# Patient Record
Sex: MALE | Race: WHITE | NOT HISPANIC OR LATINO | Employment: OTHER | URBAN - METROPOLITAN AREA
[De-identification: names, ages, dates, MRNs, and addresses within clinical notes are randomized per-mention and may not be internally consistent; named-entity substitution may affect disease eponyms.]

---

## 2017-01-31 ENCOUNTER — OFFICE VISIT - HEALTHEAST (OUTPATIENT)
Dept: FAMILY MEDICINE | Facility: CLINIC | Age: 62
End: 2017-01-31

## 2017-01-31 ENCOUNTER — RECORDS - HEALTHEAST (OUTPATIENT)
Dept: GENERAL RADIOLOGY | Facility: CLINIC | Age: 62
End: 2017-01-31

## 2017-01-31 DIAGNOSIS — M25.512 PAIN IN LEFT SHOULDER: ICD-10-CM

## 2017-01-31 DIAGNOSIS — M25.512 LEFT SHOULDER PAIN: ICD-10-CM

## 2017-02-01 ENCOUNTER — COMMUNICATION - HEALTHEAST (OUTPATIENT)
Dept: FAMILY MEDICINE | Facility: CLINIC | Age: 62
End: 2017-02-01

## 2017-10-27 ENCOUNTER — AMBULATORY - HEALTHEAST (OUTPATIENT)
Dept: NURSING | Facility: CLINIC | Age: 62
End: 2017-10-27

## 2017-10-27 DIAGNOSIS — Z23 NEED FOR INFLUENZA VACCINATION: ICD-10-CM

## 2018-12-11 ENCOUNTER — AMBULATORY - HEALTHEAST (OUTPATIENT)
Dept: FAMILY MEDICINE | Facility: CLINIC | Age: 63
End: 2018-12-11

## 2018-12-11 ENCOUNTER — AMBULATORY - HEALTHEAST (OUTPATIENT)
Dept: NURSING | Facility: CLINIC | Age: 63
End: 2018-12-11

## 2020-11-24 ENCOUNTER — AMBULATORY - HEALTHEAST (OUTPATIENT)
Dept: NURSING | Facility: CLINIC | Age: 65
End: 2020-11-24

## 2021-05-30 VITALS — WEIGHT: 164 LBS | BODY MASS INDEX: 25.31 KG/M2

## 2021-06-08 NOTE — PROGRESS NOTES
"  Subjective:      Rodrigo Lanier is a 61 y.o. male who presents for evaluation of left shoulder pain.  He fell on 12/30/16.  He was walking outside and slipped on some ice on the sidewalk.  He caught himself with his left hand, then his elbow hit the ground, then he thinks he landed on his shoulder.  Pain was pretty bad immediately after the fall.  Pain in the shoulder has improved over time.  However, it still bothering him somewhat.  Specifically, he notices that if he raises his arm above shoulder level or above his head, he has significant pain and range of motion is decreased.  He is left-hand dominant.  Normal  strength in the left hand.  He says sometimes he feels like his shoulder muscles \"feel weak.\"   If he raises his left arm above shoulder level using his right arm, it is less painful.    He has iced it and used ibuprofen.  They have helped a little bit.  He has a past history of a left elbow fracture 10 years ago.    Patient Active Problem List   Diagnosis     Snoring     Hyperlipidemia     Nicotine dependence     Skin lesion     No current outpatient prescriptions on file.     Objective:     No Known Allergies  Vitals:    01/31/17 1127   BP: 148/80   Pulse:    Resp:    SpO2:      Body mass index is 25.31 kg/(m^2).    Vitals reviewed as above.  General: Patient is alert and oriented x 3, in no apparent distress  Cardiac: Regular rate and rhythm, no murmurs  Pulmonary: lungs clear to ausculation, no crackles, rales, rhonchi, or wheezing  Musculoskeletal: Normal 4/5 strength in major muscle groups in upper extremities bilaterally, normal left hand  strength, normal left radial pulse, normal pain with flexion or extension at the left elbow, joint appears to be moving in normal alignment in the left elbow, left shoulder appears healthy and normal, no edema, no pain with palpation along the left lateral clavicle, no pain with palpation of the left shoulder joint, no ligament laxity noted in the " shoulder, he has difficulty with external rotation of left shoulder, can complete internal rotation of the left shoulder, he can raise his arm to 90  without significant pain, cannot raise it above that, if I raise his arm above that I can increase his range of motion slightly, but it is painful    I personally reviewed grossly normal shoulder x-ray today.  XR SHOULDER LEFT 2 OR MORE VWS1/31/2017 11:43 AM  INDICATION: left shoulder pain. COMPARISON: None.  FINDINGS: Negative shoulder. No fracture or dislocation.  This report was electronically interpreted by: Dr. Gia Aquino MD ON 01/31/2017 at 12:59    Assessment and Plan:     Left shoulder pain, status post fall.  Pain has improved a fair amount since injury.  X-ray completed today and appears grossly normal.  We'll contact patient with normal results.  Consider the following treatment options: Physical therapy, monitoring, MRI, Ortho referral.  Patient will let us know which she prefers.  Until then conservative management and ibuprofen as needed.    This dictation uses voice recognition software, which may contain typographical errors.

## 2022-11-15 PROCEDURE — 99285 EMERGENCY DEPT VISIT HI MDM: CPT | Mod: 25

## 2022-11-16 ENCOUNTER — HOSPITAL ENCOUNTER (INPATIENT)
Facility: CLINIC | Age: 67
LOS: 4 days | Discharge: HOME | DRG: 234 | End: 2022-11-20
Attending: EMERGENCY MEDICINE | Admitting: INTERNAL MEDICINE
Payer: MEDICARE

## 2022-11-16 ENCOUNTER — APPOINTMENT (OUTPATIENT)
Dept: GENERAL RADIOLOGY | Facility: CLINIC | Age: 67
DRG: 234 | End: 2022-11-16
Attending: PHYSICIAN ASSISTANT
Payer: MEDICARE

## 2022-11-16 ENCOUNTER — ANESTHESIA EVENT (OUTPATIENT)
Dept: SURGERY | Facility: CLINIC | Age: 67
DRG: 234 | End: 2022-11-16
Payer: MEDICARE

## 2022-11-16 ENCOUNTER — APPOINTMENT (OUTPATIENT)
Dept: CARDIOLOGY | Facility: CLINIC | Age: 67
DRG: 234 | End: 2022-11-16
Attending: INTERNAL MEDICINE
Payer: MEDICARE

## 2022-11-16 ENCOUNTER — APPOINTMENT (OUTPATIENT)
Dept: GENERAL RADIOLOGY | Facility: CLINIC | Age: 67
DRG: 234 | End: 2022-11-16
Attending: INTERNAL MEDICINE
Payer: MEDICARE

## 2022-11-16 ENCOUNTER — ANESTHESIA (OUTPATIENT)
Dept: SURGERY | Facility: CLINIC | Age: 67
DRG: 234 | End: 2022-11-16
Payer: MEDICARE

## 2022-11-16 ENCOUNTER — APPOINTMENT (OUTPATIENT)
Dept: CT IMAGING | Facility: CLINIC | Age: 67
DRG: 234 | End: 2022-11-16
Attending: EMERGENCY MEDICINE
Payer: MEDICARE

## 2022-11-16 DIAGNOSIS — E13.69 OTHER SPECIFIED DIABETES MELLITUS WITH OTHER SPECIFIED COMPLICATION, WITHOUT LONG-TERM CURRENT USE OF INSULIN (H): ICD-10-CM

## 2022-11-16 DIAGNOSIS — I21.4 NSTEMI (NON-ST ELEVATED MYOCARDIAL INFARCTION) (H): ICD-10-CM

## 2022-11-16 DIAGNOSIS — I25.110 CORONARY ARTERY DISEASE INVOLVING NATIVE CORONARY ARTERY OF NATIVE HEART WITH UNSTABLE ANGINA PECTORIS (H): ICD-10-CM

## 2022-11-16 DIAGNOSIS — F17.200 CONTINUOUS NICOTINE DEPENDENCE: ICD-10-CM

## 2022-11-16 DIAGNOSIS — I10 BENIGN ESSENTIAL HYPERTENSION: ICD-10-CM

## 2022-11-16 DIAGNOSIS — Z95.1 S/P CABG (CORONARY ARTERY BYPASS GRAFT): Primary | ICD-10-CM

## 2022-11-16 LAB
ABO/RH(D): NORMAL
ABO/RH(D): NORMAL
ACT BLD: 165 SECONDS (ref 74–150)
ALBUMIN SERPL-MCNC: 2.6 G/DL (ref 3.4–5)
ALBUMIN SERPL-MCNC: 3.3 G/DL (ref 3.4–5)
ALBUMIN UR-MCNC: 30 MG/DL
ALP SERPL-CCNC: 56 U/L (ref 40–150)
ALP SERPL-CCNC: 77 U/L (ref 40–150)
ALT SERPL W P-5'-P-CCNC: 30 U/L (ref 0–70)
ALT SERPL W P-5'-P-CCNC: 32 U/L (ref 0–70)
ANION GAP SERPL CALCULATED.3IONS-SCNC: 11 MMOL/L (ref 3–14)
ANION GAP SERPL CALCULATED.3IONS-SCNC: 4 MMOL/L (ref 3–14)
ANION GAP SERPL CALCULATED.3IONS-SCNC: 5 MMOL/L (ref 3–14)
ANTIBODY SCREEN: NEGATIVE
APPEARANCE UR: CLEAR
APTT PPP: 28 SECONDS (ref 22–38)
APTT PPP: 32 SECONDS (ref 22–38)
AST SERPL W P-5'-P-CCNC: 157 U/L (ref 0–45)
AST SERPL W P-5'-P-CCNC: 32 U/L (ref 0–45)
ATRIAL RATE - MUSE: 68 BPM
BASE EXCESS BLDA CALC-SCNC: -1.9 MMOL/L (ref -9–1.8)
BASE EXCESS BLDV CALC-SCNC: -1.4 MMOL/L (ref -7.7–1.9)
BASE EXCESS BLDV CALC-SCNC: -2.6 MMOL/L (ref -7.7–1.9)
BASOPHILS # BLD AUTO: 0.1 10E3/UL (ref 0–0.2)
BASOPHILS NFR BLD AUTO: 0 %
BILIRUB DIRECT SERPL-MCNC: <0.1 MG/DL (ref 0–0.2)
BILIRUB SERPL-MCNC: 0.4 MG/DL (ref 0.2–1.3)
BILIRUB SERPL-MCNC: 0.8 MG/DL (ref 0.2–1.3)
BILIRUB UR QL STRIP: NEGATIVE
BLD PROD TYP BPU: NORMAL
BLD PROD TYP BPU: NORMAL
BLOOD COMPONENT TYPE: NORMAL
BLOOD COMPONENT TYPE: NORMAL
BUN SERPL-MCNC: 15 MG/DL (ref 7–30)
BUN SERPL-MCNC: 15 MG/DL (ref 7–30)
BUN SERPL-MCNC: 16 MG/DL (ref 7–30)
CA-I BLD-MCNC: 4.6 MG/DL (ref 4.4–5.2)
CALCIUM SERPL-MCNC: 7.9 MG/DL (ref 8.5–10.1)
CALCIUM SERPL-MCNC: 8.5 MG/DL (ref 8.5–10.1)
CALCIUM SERPL-MCNC: 8.8 MG/DL (ref 8.5–10.1)
CHLORIDE BLD-SCNC: 105 MMOL/L (ref 94–109)
CHLORIDE BLD-SCNC: 113 MMOL/L (ref 94–109)
CHLORIDE BLD-SCNC: 116 MMOL/L (ref 94–109)
CHOLEST SERPL-MCNC: 258 MG/DL
CO2 SERPL-SCNC: 20 MMOL/L (ref 20–32)
CO2 SERPL-SCNC: 23 MMOL/L (ref 20–32)
CO2 SERPL-SCNC: 24 MMOL/L (ref 20–32)
CODING SYSTEM: NORMAL
CODING SYSTEM: NORMAL
COLOR UR AUTO: YELLOW
CREAT SERPL-MCNC: 0.68 MG/DL (ref 0.66–1.25)
CREAT SERPL-MCNC: 0.8 MG/DL (ref 0.66–1.25)
CREAT SERPL-MCNC: 0.82 MG/DL (ref 0.66–1.25)
CROSSMATCH: NORMAL
CROSSMATCH: NORMAL
DIASTOLIC BLOOD PRESSURE - MUSE: NORMAL MMHG
EOSINOPHIL # BLD AUTO: 0 10E3/UL (ref 0–0.7)
EOSINOPHIL NFR BLD AUTO: 0 %
ERYTHROCYTE [DISTWIDTH] IN BLOOD BY AUTOMATED COUNT: 11.9 % (ref 10–15)
ERYTHROCYTE [DISTWIDTH] IN BLOOD BY AUTOMATED COUNT: 12.2 % (ref 10–15)
ERYTHROCYTE [DISTWIDTH] IN BLOOD BY AUTOMATED COUNT: 12.3 % (ref 10–15)
FIBRINOGEN PPP-MCNC: 233 MG/DL (ref 170–490)
FIBRINOGEN PPP-MCNC: 234 MG/DL (ref 170–490)
GFR SERPL CREATININE-BSD FRML MDRD: >90 ML/MIN/1.73M2
GLUCOSE BLD-MCNC: 130 MG/DL (ref 70–99)
GLUCOSE BLD-MCNC: 160 MG/DL (ref 70–99)
GLUCOSE BLD-MCNC: 230 MG/DL (ref 70–99)
GLUCOSE BLDC GLUCOMTR-MCNC: 122 MG/DL (ref 70–99)
GLUCOSE BLDC GLUCOMTR-MCNC: 127 MG/DL (ref 70–99)
GLUCOSE BLDC GLUCOMTR-MCNC: 137 MG/DL (ref 70–99)
GLUCOSE BLDC GLUCOMTR-MCNC: 204 MG/DL (ref 70–99)
GLUCOSE UR STRIP-MCNC: 150 MG/DL
HBA1C MFR BLD: 6.6 % (ref 0–5.6)
HCO3 BLD-SCNC: 24 MMOL/L (ref 21–28)
HCO3 BLDV-SCNC: 23 MMOL/L (ref 21–28)
HCO3 BLDV-SCNC: 25 MMOL/L (ref 21–28)
HCT VFR BLD AUTO: 34.2 % (ref 40–53)
HCT VFR BLD AUTO: 36.9 % (ref 40–53)
HCT VFR BLD AUTO: 48 % (ref 40–53)
HDLC SERPL-MCNC: 40 MG/DL
HGB BLD-MCNC: 11.7 G/DL (ref 13.3–17.7)
HGB BLD-MCNC: 12.9 G/DL (ref 13.3–17.7)
HGB BLD-MCNC: 16.8 G/DL (ref 13.3–17.7)
HGB UR QL STRIP: ABNORMAL
IMM GRANULOCYTES # BLD: 0.1 10E3/UL
IMM GRANULOCYTES NFR BLD: 0 %
INR PPP: 1.28 (ref 0.85–1.15)
INR PPP: 1.52 (ref 0.85–1.15)
INTERPRETATION ECG - MUSE: NORMAL
ISSUE DATE AND TIME: NORMAL
ISSUE DATE AND TIME: NORMAL
KETONES UR STRIP-MCNC: ABNORMAL MG/DL
LACTATE SERPL-SCNC: 2.9 MMOL/L (ref 0.7–2)
LDLC SERPL CALC-MCNC: 173 MG/DL
LEUKOCYTE ESTERASE UR QL STRIP: ABNORMAL
LVEF ECHO: NORMAL
LYMPHOCYTES # BLD AUTO: 2.4 10E3/UL (ref 0.8–5.3)
LYMPHOCYTES NFR BLD AUTO: 18 %
MAGNESIUM SERPL-MCNC: 2.7 MG/DL (ref 1.6–2.3)
MCH RBC QN AUTO: 29.7 PG (ref 26.5–33)
MCH RBC QN AUTO: 30.3 PG (ref 26.5–33)
MCH RBC QN AUTO: 30.6 PG (ref 26.5–33)
MCHC RBC AUTO-ENTMCNC: 34.2 G/DL (ref 31.5–36.5)
MCHC RBC AUTO-ENTMCNC: 35 G/DL (ref 31.5–36.5)
MCHC RBC AUTO-ENTMCNC: 35 G/DL (ref 31.5–36.5)
MCV RBC AUTO: 85 FL (ref 78–100)
MCV RBC AUTO: 87 FL (ref 78–100)
MCV RBC AUTO: 89 FL (ref 78–100)
MONOCYTES # BLD AUTO: 0.7 10E3/UL (ref 0–1.3)
MONOCYTES NFR BLD AUTO: 5 %
MUCOUS THREADS #/AREA URNS LPF: PRESENT /LPF
NEUTROPHILS # BLD AUTO: 9.6 10E3/UL (ref 1.6–8.3)
NEUTROPHILS NFR BLD AUTO: 77 %
NITRATE UR QL: NEGATIVE
NONHDLC SERPL-MCNC: 218 MG/DL
NRBC # BLD AUTO: 0 10E3/UL
NRBC BLD AUTO-RTO: 0 /100
NT-PROBNP SERPL-MCNC: 605 PG/ML (ref 0–900)
O2/TOTAL GAS SETTING VFR VENT: 50 %
OXYHGB MFR BLDV: 72 % (ref 70–75)
OXYHGB MFR BLDV: 97 % (ref 70–75)
P AXIS - MUSE: 79 DEGREES
PCO2 BLD: 42 MM HG (ref 35–45)
PCO2 BLDV: 43 MM HG (ref 40–50)
PCO2 BLDV: 47 MM HG (ref 40–50)
PH BLD: 7.36 [PH] (ref 7.35–7.45)
PH BLDV: 7.33 [PH] (ref 7.32–7.43)
PH BLDV: 7.34 [PH] (ref 7.32–7.43)
PH UR STRIP: 5.5 [PH] (ref 5–7)
PHOSPHATE SERPL-MCNC: 2.5 MG/DL (ref 2.5–4.5)
PLATELET # BLD AUTO: 145 10E3/UL (ref 150–450)
PLATELET # BLD AUTO: 162 10E3/UL (ref 150–450)
PLATELET # BLD AUTO: 234 10E3/UL (ref 150–450)
PO2 BLD: 157 MM HG (ref 80–105)
PO2 BLDV: 101 MM HG (ref 25–47)
PO2 BLDV: 40 MM HG (ref 25–47)
POTASSIUM BLD-SCNC: 3.7 MMOL/L (ref 3.4–5.3)
POTASSIUM BLD-SCNC: 4 MMOL/L (ref 3.4–5.3)
POTASSIUM BLD-SCNC: 4.2 MMOL/L (ref 3.4–5.3)
PR INTERVAL - MUSE: 160 MS
PROT SERPL-MCNC: 5 G/DL (ref 6.8–8.8)
PROT SERPL-MCNC: 6.6 G/DL (ref 6.8–8.8)
QRS DURATION - MUSE: 88 MS
QT - MUSE: 394 MS
QTC - MUSE: 418 MS
R AXIS - MUSE: 35 DEGREES
RBC # BLD AUTO: 3.86 10E6/UL (ref 4.4–5.9)
RBC # BLD AUTO: 4.22 10E6/UL (ref 4.4–5.9)
RBC # BLD AUTO: 5.65 10E6/UL (ref 4.4–5.9)
RBC URINE: 72 /HPF
SARS-COV-2 RNA RESP QL NAA+PROBE: NEGATIVE
SODIUM SERPL-SCNC: 136 MMOL/L (ref 133–144)
SODIUM SERPL-SCNC: 141 MMOL/L (ref 133–144)
SODIUM SERPL-SCNC: 144 MMOL/L (ref 133–144)
SP GR UR STRIP: 1.02 (ref 1–1.03)
SPECIMEN EXPIRATION DATE: NORMAL
SPECIMEN EXPIRATION DATE: NORMAL
SQUAMOUS EPITHELIAL: <1 /HPF
SYSTOLIC BLOOD PRESSURE - MUSE: NORMAL MMHG
T AXIS - MUSE: 189 DEGREES
TRIGL SERPL-MCNC: 227 MG/DL
TROPONIN I SERPL HS-MCNC: 1675 NG/L
TROPONIN I SERPL HS-MCNC: 809 NG/L
UFH PPP CHRO-ACNC: 0.33 IU/ML
UNIT ABO/RH: NORMAL
UNIT ABO/RH: NORMAL
UNIT NUMBER: NORMAL
UNIT NUMBER: NORMAL
UNIT STATUS: NORMAL
UNIT STATUS: NORMAL
UNIT TYPE ISBT: 7300
UNIT TYPE ISBT: 7300
UROBILINOGEN UR STRIP-MCNC: NORMAL MG/DL
VENTRICULAR RATE- MUSE: 68 BPM
WBC # BLD AUTO: 12.8 10E3/UL (ref 4–11)
WBC # BLD AUTO: 20.9 10E3/UL (ref 4–11)
WBC # BLD AUTO: 21.4 10E3/UL (ref 4–11)
WBC URINE: 11 /HPF

## 2022-11-16 PROCEDURE — 83605 ASSAY OF LACTIC ACID: CPT | Performed by: PHYSICIAN ASSISTANT

## 2022-11-16 PROCEDURE — 250N000011 HC RX IP 250 OP 636: Performed by: STUDENT IN AN ORGANIZED HEALTH CARE EDUCATION/TRAINING PROGRAM

## 2022-11-16 PROCEDURE — 258N000003 HC RX IP 258 OP 636: Performed by: STUDENT IN AN ORGANIZED HEALTH CARE EDUCATION/TRAINING PROGRAM

## 2022-11-16 PROCEDURE — 250N000009 HC RX 250

## 2022-11-16 PROCEDURE — 370N000017 HC ANESTHESIA TECHNICAL FEE, PER MIN: Performed by: STUDENT IN AN ORGANIZED HEALTH CARE EDUCATION/TRAINING PROGRAM

## 2022-11-16 PROCEDURE — C1894 INTRO/SHEATH, NON-LASER: HCPCS | Performed by: INTERNAL MEDICINE

## 2022-11-16 PROCEDURE — 258N000003 HC RX IP 258 OP 636: Performed by: PHYSICIAN ASSISTANT

## 2022-11-16 PROCEDURE — 81003 URINALYSIS AUTO W/O SCOPE: CPT | Performed by: INTERNAL MEDICINE

## 2022-11-16 PROCEDURE — C1725 CATH, TRANSLUMIN NON-LASER: HCPCS | Performed by: INTERNAL MEDICINE

## 2022-11-16 PROCEDURE — 410N000005 HC PER-PERFUSION, SH ONLY, 1ST 30 MIN: Performed by: STUDENT IN AN ORGANIZED HEALTH CARE EDUCATION/TRAINING PROGRAM

## 2022-11-16 PROCEDURE — 83735 ASSAY OF MAGNESIUM: CPT | Performed by: PHYSICIAN ASSISTANT

## 2022-11-16 PROCEDURE — 82805 BLOOD GASES W/O2 SATURATION: CPT | Performed by: STUDENT IN AN ORGANIZED HEALTH CARE EDUCATION/TRAINING PROGRAM

## 2022-11-16 PROCEDURE — 93005 ELECTROCARDIOGRAM TRACING: CPT

## 2022-11-16 PROCEDURE — 99152 MOD SED SAME PHYS/QHP 5/>YRS: CPT | Performed by: INTERNAL MEDICINE

## 2022-11-16 PROCEDURE — U0005 INFEC AGEN DETEC AMPLI PROBE: HCPCS | Performed by: EMERGENCY MEDICINE

## 2022-11-16 PROCEDURE — 82330 ASSAY OF CALCIUM: CPT | Performed by: STUDENT IN AN ORGANIZED HEALTH CARE EDUCATION/TRAINING PROGRAM

## 2022-11-16 PROCEDURE — 272N000001 HC OR GENERAL SUPPLY STERILE: Performed by: STUDENT IN AN ORGANIZED HEALTH CARE EDUCATION/TRAINING PROGRAM

## 2022-11-16 PROCEDURE — 94002 VENT MGMT INPAT INIT DAY: CPT

## 2022-11-16 PROCEDURE — 021209W BYPASS CORONARY ARTERY, THREE ARTERIES FROM AORTA WITH AUTOLOGOUS VENOUS TISSUE, OPEN APPROACH: ICD-10-PCS | Performed by: STUDENT IN AN ORGANIZED HEALTH CARE EDUCATION/TRAINING PROGRAM

## 2022-11-16 PROCEDURE — 36415 COLL VENOUS BLD VENIPUNCTURE: CPT | Performed by: INTERNAL MEDICINE

## 2022-11-16 PROCEDURE — 99223 1ST HOSP IP/OBS HIGH 75: CPT | Mod: AI | Performed by: INTERNAL MEDICINE

## 2022-11-16 PROCEDURE — 85610 PROTHROMBIN TIME: CPT | Performed by: PHYSICIAN ASSISTANT

## 2022-11-16 PROCEDURE — 250N000013 HC RX MED GY IP 250 OP 250 PS 637: Performed by: INTERNAL MEDICINE

## 2022-11-16 PROCEDURE — 999N000157 HC STATISTIC RCP TIME EA 10 MIN

## 2022-11-16 PROCEDURE — 85025 COMPLETE CBC W/AUTO DIFF WBC: CPT | Performed by: EMERGENCY MEDICINE

## 2022-11-16 PROCEDURE — 85027 COMPLETE CBC AUTOMATED: CPT | Performed by: PHYSICIAN ASSISTANT

## 2022-11-16 PROCEDURE — 250N000013 HC RX MED GY IP 250 OP 250 PS 637: Performed by: PHYSICIAN ASSISTANT

## 2022-11-16 PROCEDURE — 84100 ASSAY OF PHOSPHORUS: CPT | Performed by: PHYSICIAN ASSISTANT

## 2022-11-16 PROCEDURE — 250N000011 HC RX IP 250 OP 636: Performed by: ANESTHESIOLOGY

## 2022-11-16 PROCEDURE — 250N000024 HC ISOFLURANE, PER MIN: Performed by: STUDENT IN AN ORGANIZED HEALTH CARE EDUCATION/TRAINING PROGRAM

## 2022-11-16 PROCEDURE — 80061 LIPID PANEL: CPT | Performed by: INTERNAL MEDICINE

## 2022-11-16 PROCEDURE — 999N000065 XR ABDOMEN PORT 1 VIEW

## 2022-11-16 PROCEDURE — 258N000003 HC RX IP 258 OP 636: Performed by: ANESTHESIOLOGY

## 2022-11-16 PROCEDURE — 250N000013 HC RX MED GY IP 250 OP 250 PS 637: Performed by: EMERGENCY MEDICINE

## 2022-11-16 PROCEDURE — B2111ZZ FLUOROSCOPY OF MULTIPLE CORONARY ARTERIES USING LOW OSMOLAR CONTRAST: ICD-10-PCS | Performed by: INTERNAL MEDICINE

## 2022-11-16 PROCEDURE — G1010 CDSM STANSON: HCPCS

## 2022-11-16 PROCEDURE — 36415 COLL VENOUS BLD VENIPUNCTURE: CPT | Performed by: EMERGENCY MEDICINE

## 2022-11-16 PROCEDURE — 5A1221Z PERFORMANCE OF CARDIAC OUTPUT, CONTINUOUS: ICD-10-PCS | Performed by: STUDENT IN AN ORGANIZED HEALTH CARE EDUCATION/TRAINING PROGRAM

## 2022-11-16 PROCEDURE — 82803 BLOOD GASES ANY COMBINATION: CPT | Performed by: STUDENT IN AN ORGANIZED HEALTH CARE EDUCATION/TRAINING PROGRAM

## 2022-11-16 PROCEDURE — 99222 1ST HOSP IP/OBS MODERATE 55: CPT | Mod: 57 | Performed by: PHYSICIAN ASSISTANT

## 2022-11-16 PROCEDURE — 84484 ASSAY OF TROPONIN QUANT: CPT | Performed by: EMERGENCY MEDICINE

## 2022-11-16 PROCEDURE — 250N000011 HC RX IP 250 OP 636: Performed by: INTERNAL MEDICINE

## 2022-11-16 PROCEDURE — 85384 FIBRINOGEN ACTIVITY: CPT | Performed by: STUDENT IN AN ORGANIZED HEALTH CARE EDUCATION/TRAINING PROGRAM

## 2022-11-16 PROCEDURE — 83036 HEMOGLOBIN GLYCOSYLATED A1C: CPT | Performed by: EMERGENCY MEDICINE

## 2022-11-16 PROCEDURE — 99207 PR SC NO CHARGE VISIT: CPT | Performed by: PHYSICIAN ASSISTANT

## 2022-11-16 PROCEDURE — 84132 ASSAY OF SERUM POTASSIUM: CPT | Performed by: STUDENT IN AN ORGANIZED HEALTH CARE EDUCATION/TRAINING PROGRAM

## 2022-11-16 PROCEDURE — 99152 MOD SED SAME PHYS/QHP 5/>YRS: CPT | Mod: GC | Performed by: INTERNAL MEDICINE

## 2022-11-16 PROCEDURE — 250N000011 HC RX IP 250 OP 636

## 2022-11-16 PROCEDURE — 83880 ASSAY OF NATRIURETIC PEPTIDE: CPT | Performed by: EMERGENCY MEDICINE

## 2022-11-16 PROCEDURE — 250N000009 HC RX 250: Performed by: ANESTHESIOLOGY

## 2022-11-16 PROCEDURE — 999N000208 ECHOCARDIOGRAM COMPLETE

## 2022-11-16 PROCEDURE — 80048 BASIC METABOLIC PNL TOTAL CA: CPT | Performed by: EMERGENCY MEDICINE

## 2022-11-16 PROCEDURE — 85730 THROMBOPLASTIN TIME PARTIAL: CPT | Performed by: PHYSICIAN ASSISTANT

## 2022-11-16 PROCEDURE — 87086 URINE CULTURE/COLONY COUNT: CPT | Performed by: INTERNAL MEDICINE

## 2022-11-16 PROCEDURE — 06BQ4ZZ EXCISION OF LEFT SAPHENOUS VEIN, PERCUTANEOUS ENDOSCOPIC APPROACH: ICD-10-PCS | Performed by: STUDENT IN AN ORGANIZED HEALTH CARE EDUCATION/TRAINING PROGRAM

## 2022-11-16 PROCEDURE — C1898 LEAD, PMKR, OTHER THAN TRANS: HCPCS | Performed by: STUDENT IN AN ORGANIZED HEALTH CARE EDUCATION/TRAINING PROGRAM

## 2022-11-16 PROCEDURE — 74177 CT ABD & PELVIS W/CONTRAST: CPT | Mod: MG

## 2022-11-16 PROCEDURE — 85610 PROTHROMBIN TIME: CPT | Performed by: STUDENT IN AN ORGANIZED HEALTH CARE EDUCATION/TRAINING PROGRAM

## 2022-11-16 PROCEDURE — 258N000003 HC RX IP 258 OP 636

## 2022-11-16 PROCEDURE — 86923 COMPATIBILITY TEST ELECTRIC: CPT | Performed by: THORACIC SURGERY (CARDIOTHORACIC VASCULAR SURGERY)

## 2022-11-16 PROCEDURE — 93306 TTE W/DOPPLER COMPLETE: CPT | Mod: 26 | Performed by: INTERNAL MEDICINE

## 2022-11-16 PROCEDURE — 250N000009 HC RX 250: Performed by: STUDENT IN AN ORGANIZED HEALTH CARE EDUCATION/TRAINING PROGRAM

## 2022-11-16 PROCEDURE — 84155 ASSAY OF PROTEIN SERUM: CPT | Performed by: PHYSICIAN ASSISTANT

## 2022-11-16 PROCEDURE — 85520 HEPARIN ASSAY: CPT | Performed by: INTERNAL MEDICINE

## 2022-11-16 PROCEDURE — 86850 RBC ANTIBODY SCREEN: CPT | Performed by: THORACIC SURGERY (CARDIOTHORACIC VASCULAR SURGERY)

## 2022-11-16 PROCEDURE — 250N000009 HC RX 250: Performed by: INTERNAL MEDICINE

## 2022-11-16 PROCEDURE — C1769 GUIDE WIRE: HCPCS | Performed by: INTERNAL MEDICINE

## 2022-11-16 PROCEDURE — 999N000009 HC STATISTIC AIRWAY CARE

## 2022-11-16 PROCEDURE — 250N000011 HC RX IP 250 OP 636: Performed by: EMERGENCY MEDICINE

## 2022-11-16 PROCEDURE — 250N000009 HC RX 250: Performed by: PHYSICIAN ASSISTANT

## 2022-11-16 PROCEDURE — 360N000079 HC SURGERY LEVEL 6, PER MIN: Performed by: STUDENT IN AN ORGANIZED HEALTH CARE EDUCATION/TRAINING PROGRAM

## 2022-11-16 PROCEDURE — 0210098 BYPASS CORONARY ARTERY, ONE ARTERY FROM RIGHT INTERNAL MAMMARY WITH AUTOLOGOUS VENOUS TISSUE, OPEN APPROACH: ICD-10-PCS | Performed by: STUDENT IN AN ORGANIZED HEALTH CARE EDUCATION/TRAINING PROGRAM

## 2022-11-16 PROCEDURE — 33967 INSERT I-AORT PERCUT DEVICE: CPT | Performed by: INTERNAL MEDICINE

## 2022-11-16 PROCEDURE — 33967 INSERT I-AORT PERCUT DEVICE: CPT | Mod: GC | Performed by: INTERNAL MEDICINE

## 2022-11-16 PROCEDURE — 85027 COMPLETE CBC AUTOMATED: CPT | Performed by: STUDENT IN AN ORGANIZED HEALTH CARE EDUCATION/TRAINING PROGRAM

## 2022-11-16 PROCEDURE — 85347 COAGULATION TIME ACTIVATED: CPT

## 2022-11-16 PROCEDURE — 250N000012 HC RX MED GY IP 250 OP 636 PS 637: Performed by: STUDENT IN AN ORGANIZED HEALTH CARE EDUCATION/TRAINING PROGRAM

## 2022-11-16 PROCEDURE — 93454 CORONARY ARTERY ANGIO S&I: CPT | Mod: 26 | Performed by: INTERNAL MEDICINE

## 2022-11-16 PROCEDURE — 93454 CORONARY ARTERY ANGIO S&I: CPT | Performed by: INTERNAL MEDICINE

## 2022-11-16 PROCEDURE — 5A02210 ASSISTANCE WITH CARDIAC OUTPUT USING BALLOON PUMP, CONTINUOUS: ICD-10-PCS | Performed by: INTERNAL MEDICINE

## 2022-11-16 PROCEDURE — 250N000009 HC RX 250: Performed by: EMERGENCY MEDICINE

## 2022-11-16 PROCEDURE — 272N000001 HC OR GENERAL SUPPLY STERILE: Performed by: INTERNAL MEDICINE

## 2022-11-16 PROCEDURE — 99223 1ST HOSP IP/OBS HIGH 75: CPT | Mod: 25 | Performed by: INTERNAL MEDICINE

## 2022-11-16 PROCEDURE — P9016 RBC LEUKOCYTES REDUCED: HCPCS | Performed by: THORACIC SURGERY (CARDIOTHORACIC VASCULAR SURGERY)

## 2022-11-16 PROCEDURE — 85730 THROMBOPLASTIN TIME PARTIAL: CPT | Performed by: STUDENT IN AN ORGANIZED HEALTH CARE EDUCATION/TRAINING PROGRAM

## 2022-11-16 PROCEDURE — P9045 ALBUMIN (HUMAN), 5%, 250 ML: HCPCS

## 2022-11-16 PROCEDURE — 410N000006: Performed by: STUDENT IN AN ORGANIZED HEALTH CARE EDUCATION/TRAINING PROGRAM

## 2022-11-16 PROCEDURE — 200N000001 HC R&B ICU

## 2022-11-16 PROCEDURE — 99291 CRITICAL CARE FIRST HOUR: CPT | Performed by: INTERNAL MEDICINE

## 2022-11-16 PROCEDURE — 99153 MOD SED SAME PHYS/QHP EA: CPT | Performed by: INTERNAL MEDICINE

## 2022-11-16 PROCEDURE — 84484 ASSAY OF TROPONIN QUANT: CPT | Mod: 91 | Performed by: INTERNAL MEDICINE

## 2022-11-16 PROCEDURE — 36415 COLL VENOUS BLD VENIPUNCTURE: CPT | Performed by: THORACIC SURGERY (CARDIOTHORACIC VASCULAR SURGERY)

## 2022-11-16 PROCEDURE — 3E043XZ INTRODUCTION OF VASOPRESSOR INTO CENTRAL VEIN, PERCUTANEOUS APPROACH: ICD-10-PCS | Performed by: STUDENT IN AN ORGANIZED HEALTH CARE EDUCATION/TRAINING PROGRAM

## 2022-11-16 PROCEDURE — 999N000065 XR CHEST PORT 1 VIEW

## 2022-11-16 PROCEDURE — 255N000002 HC RX 255 OP 636: Performed by: INTERNAL MEDICINE

## 2022-11-16 PROCEDURE — 82330 ASSAY OF CALCIUM: CPT | Performed by: PHYSICIAN ASSISTANT

## 2022-11-16 PROCEDURE — C9803 HOPD COVID-19 SPEC COLLECT: HCPCS

## 2022-11-16 PROCEDURE — 82248 BILIRUBIN DIRECT: CPT | Performed by: INTERNAL MEDICINE

## 2022-11-16 PROCEDURE — 86901 BLOOD TYPING SEROLOGIC RH(D): CPT | Performed by: THORACIC SURGERY (CARDIOTHORACIC VASCULAR SURGERY)

## 2022-11-16 RX ORDER — ATROPINE SULFATE 0.1 MG/ML
0.5 INJECTION INTRAVENOUS
Status: CANCELLED | OUTPATIENT
Start: 2022-11-16 | End: 2022-11-16

## 2022-11-16 RX ORDER — NALOXONE HYDROCHLORIDE 0.4 MG/ML
0.4 INJECTION, SOLUTION INTRAMUSCULAR; INTRAVENOUS; SUBCUTANEOUS
Status: CANCELLED | OUTPATIENT
Start: 2022-11-16 | End: 2022-11-16

## 2022-11-16 RX ORDER — MAGNESIUM HYDROXIDE/ALUMINUM HYDROXICE/SIMETHICONE 120; 1200; 1200 MG/30ML; MG/30ML; MG/30ML
30 SUSPENSION ORAL EVERY 4 HOURS PRN
Status: DISCONTINUED | OUTPATIENT
Start: 2022-11-16 | End: 2022-11-20 | Stop reason: HOSPADM

## 2022-11-16 RX ORDER — HEPARIN SODIUM 1000 [USP'U]/ML
INJECTION, SOLUTION INTRAVENOUS; SUBCUTANEOUS PRN
Status: DISCONTINUED | OUTPATIENT
Start: 2022-11-16 | End: 2022-11-16

## 2022-11-16 RX ORDER — LIDOCAINE 40 MG/G
CREAM TOPICAL
Status: DISCONTINUED | OUTPATIENT
Start: 2022-11-16 | End: 2022-11-16

## 2022-11-16 RX ORDER — MUPIROCIN 20 MG/G
0.5 OINTMENT TOPICAL 2 TIMES DAILY
Status: DISCONTINUED | OUTPATIENT
Start: 2022-11-16 | End: 2022-11-18 | Stop reason: CLARIF

## 2022-11-16 RX ORDER — OXYCODONE HYDROCHLORIDE 5 MG/1
10 TABLET ORAL EVERY 4 HOURS PRN
Status: CANCELLED | OUTPATIENT
Start: 2022-11-16

## 2022-11-16 RX ORDER — HYDROMORPHONE HCL IN WATER/PF 6 MG/30 ML
0.4 PATIENT CONTROLLED ANALGESIA SYRINGE INTRAVENOUS
Status: DISCONTINUED | OUTPATIENT
Start: 2022-11-16 | End: 2022-11-20 | Stop reason: HOSPADM

## 2022-11-16 RX ORDER — ASPIRIN 81 MG/1
324 TABLET, CHEWABLE ORAL ONCE
Status: COMPLETED | OUTPATIENT
Start: 2022-11-16 | End: 2022-11-16

## 2022-11-16 RX ORDER — LISINOPRIL 20 MG/1
20 TABLET ORAL DAILY
Status: DISCONTINUED | OUTPATIENT
Start: 2022-11-17 | End: 2022-11-16

## 2022-11-16 RX ORDER — HEPARIN SODIUM 1000 [USP'U]/ML
INJECTION, SOLUTION INTRAVENOUS; SUBCUTANEOUS
Status: DISCONTINUED | OUTPATIENT
Start: 2022-11-16 | End: 2022-11-16 | Stop reason: HOSPADM

## 2022-11-16 RX ORDER — PROPOFOL 10 MG/ML
INJECTION, EMULSION INTRAVENOUS PRN
Status: DISCONTINUED | OUTPATIENT
Start: 2022-11-16 | End: 2022-11-16

## 2022-11-16 RX ORDER — ACETAMINOPHEN 325 MG/1
650 TABLET ORAL EVERY 4 HOURS PRN
Status: CANCELLED | OUTPATIENT
Start: 2022-11-16

## 2022-11-16 RX ORDER — LORAZEPAM 0.5 MG/1
0.5 TABLET ORAL
Status: DISCONTINUED | OUTPATIENT
Start: 2022-11-16 | End: 2022-11-16 | Stop reason: HOSPADM

## 2022-11-16 RX ORDER — FENTANYL CITRATE 50 UG/ML
INJECTION, SOLUTION INTRAMUSCULAR; INTRAVENOUS
Status: DISCONTINUED | OUTPATIENT
Start: 2022-11-16 | End: 2022-11-16 | Stop reason: HOSPADM

## 2022-11-16 RX ORDER — LORAZEPAM 2 MG/ML
0.5 INJECTION INTRAMUSCULAR
Status: DISCONTINUED | OUTPATIENT
Start: 2022-11-16 | End: 2022-11-16 | Stop reason: HOSPADM

## 2022-11-16 RX ORDER — ASPIRIN 81 MG/1
81 TABLET ORAL DAILY
Status: CANCELLED | OUTPATIENT
Start: 2022-11-16

## 2022-11-16 RX ORDER — HYDRALAZINE HYDROCHLORIDE 20 MG/ML
25 INJECTION INTRAMUSCULAR; INTRAVENOUS ONCE
Status: COMPLETED | OUTPATIENT
Start: 2022-11-16 | End: 2022-11-16

## 2022-11-16 RX ORDER — PHENYLEPHRINE HCL IN 0.9% NACL 50MG/250ML
.1-4 PLASTIC BAG, INJECTION (ML) INTRAVENOUS CONTINUOUS PRN
Status: DISCONTINUED | OUTPATIENT
Start: 2022-11-16 | End: 2022-11-18

## 2022-11-16 RX ORDER — ASPIRIN 325 MG
325 TABLET ORAL ONCE
Status: DISCONTINUED | OUTPATIENT
Start: 2022-11-16 | End: 2022-11-16 | Stop reason: HOSPADM

## 2022-11-16 RX ORDER — NALOXONE HYDROCHLORIDE 0.4 MG/ML
0.4 INJECTION, SOLUTION INTRAMUSCULAR; INTRAVENOUS; SUBCUTANEOUS
Status: DISCONTINUED | OUTPATIENT
Start: 2022-11-16 | End: 2022-11-16

## 2022-11-16 RX ORDER — DEXMEDETOMIDINE HYDROCHLORIDE 4 UG/ML
.2-.7 INJECTION, SOLUTION INTRAVENOUS CONTINUOUS
Status: DISCONTINUED | OUTPATIENT
Start: 2022-11-16 | End: 2022-11-18

## 2022-11-16 RX ORDER — AMOXICILLIN 250 MG
1 CAPSULE ORAL 2 TIMES DAILY
Status: DISCONTINUED | OUTPATIENT
Start: 2022-11-16 | End: 2022-11-20 | Stop reason: HOSPADM

## 2022-11-16 RX ORDER — LIDOCAINE 40 MG/G
CREAM TOPICAL
Status: CANCELLED | OUTPATIENT
Start: 2022-11-16

## 2022-11-16 RX ORDER — PROPOFOL 10 MG/ML
5-75 INJECTION, EMULSION INTRAVENOUS CONTINUOUS
Status: DISCONTINUED | OUTPATIENT
Start: 2022-11-16 | End: 2022-11-18

## 2022-11-16 RX ORDER — SODIUM CHLORIDE, SODIUM LACTATE, POTASSIUM CHLORIDE, CALCIUM CHLORIDE 600; 310; 30; 20 MG/100ML; MG/100ML; MG/100ML; MG/100ML
INJECTION, SOLUTION INTRAVENOUS CONTINUOUS PRN
Status: DISCONTINUED | OUTPATIENT
Start: 2022-11-16 | End: 2022-11-16

## 2022-11-16 RX ORDER — ASPIRIN 81 MG/1
81 TABLET, CHEWABLE ORAL
Status: CANCELLED | OUTPATIENT
Start: 2022-11-16

## 2022-11-16 RX ORDER — CHLORHEXIDINE GLUCONATE ORAL RINSE 1.2 MG/ML
15 SOLUTION DENTAL EVERY 12 HOURS
Status: DISCONTINUED | OUTPATIENT
Start: 2022-11-16 | End: 2022-11-18 | Stop reason: CLARIF

## 2022-11-16 RX ORDER — PROCHLORPERAZINE MALEATE 5 MG
5 TABLET ORAL EVERY 6 HOURS PRN
Status: DISCONTINUED | OUTPATIENT
Start: 2022-11-16 | End: 2022-11-16

## 2022-11-16 RX ORDER — SODIUM CHLORIDE 9 MG/ML
INJECTION, SOLUTION INTRAVENOUS CONTINUOUS
Status: CANCELLED | OUTPATIENT
Start: 2022-11-16

## 2022-11-16 RX ORDER — HYDROXYZINE HYDROCHLORIDE 10 MG/1
10 TABLET, FILM COATED ORAL EVERY 6 HOURS PRN
Status: DISCONTINUED | OUTPATIENT
Start: 2022-11-16 | End: 2022-11-20 | Stop reason: HOSPADM

## 2022-11-16 RX ORDER — ONDANSETRON 4 MG/1
4 TABLET, ORALLY DISINTEGRATING ORAL EVERY 6 HOURS PRN
Status: DISCONTINUED | OUTPATIENT
Start: 2022-11-16 | End: 2022-11-20 | Stop reason: HOSPADM

## 2022-11-16 RX ORDER — AMOXICILLIN 250 MG
2 CAPSULE ORAL 2 TIMES DAILY PRN
Status: DISCONTINUED | OUTPATIENT
Start: 2022-11-16 | End: 2022-11-16

## 2022-11-16 RX ORDER — CALCIUM GLUCONATE 20 MG/ML
2 INJECTION, SOLUTION INTRAVENOUS
Status: DISCONTINUED | OUTPATIENT
Start: 2022-11-16 | End: 2022-11-20 | Stop reason: HOSPADM

## 2022-11-16 RX ORDER — ACETAMINOPHEN 325 MG/1
975 TABLET ORAL EVERY 8 HOURS
Status: COMPLETED | OUTPATIENT
Start: 2022-11-16 | End: 2022-11-19

## 2022-11-16 RX ORDER — PROPOFOL 10 MG/ML
INJECTION, EMULSION INTRAVENOUS CONTINUOUS PRN
Status: DISCONTINUED | OUTPATIENT
Start: 2022-11-16 | End: 2022-11-16

## 2022-11-16 RX ORDER — GLYCOPYRROLATE 0.2 MG/ML
INJECTION, SOLUTION INTRAMUSCULAR; INTRAVENOUS PRN
Status: DISCONTINUED | OUTPATIENT
Start: 2022-11-16 | End: 2022-11-16

## 2022-11-16 RX ORDER — LIDOCAINE 40 MG/G
CREAM TOPICAL
Status: DISCONTINUED | OUTPATIENT
Start: 2022-11-16 | End: 2022-11-20 | Stop reason: HOSPADM

## 2022-11-16 RX ORDER — ASPIRIN 81 MG/1
81 TABLET ORAL DAILY
Status: DISCONTINUED | OUTPATIENT
Start: 2022-11-17 | End: 2022-11-16

## 2022-11-16 RX ORDER — NICOTINE POLACRILEX 4 MG
15-30 LOZENGE BUCCAL
Status: DISCONTINUED | OUTPATIENT
Start: 2022-11-16 | End: 2022-11-17

## 2022-11-16 RX ORDER — NALOXONE HYDROCHLORIDE 0.4 MG/ML
0.2 INJECTION, SOLUTION INTRAMUSCULAR; INTRAVENOUS; SUBCUTANEOUS
Status: DISCONTINUED | OUTPATIENT
Start: 2022-11-16 | End: 2022-11-16

## 2022-11-16 RX ORDER — FLUMAZENIL 0.1 MG/ML
0.2 INJECTION, SOLUTION INTRAVENOUS
Status: CANCELLED | OUTPATIENT
Start: 2022-11-16 | End: 2022-11-16

## 2022-11-16 RX ORDER — NITROGLYCERIN 80 MG/1
1 PATCH TRANSDERMAL DAILY
Status: DISCONTINUED | OUTPATIENT
Start: 2022-11-16 | End: 2022-11-16

## 2022-11-16 RX ORDER — SODIUM CHLORIDE 9 MG/ML
INJECTION, SOLUTION INTRAVENOUS CONTINUOUS PRN
Status: DISCONTINUED | OUTPATIENT
Start: 2022-11-16 | End: 2022-11-16

## 2022-11-16 RX ORDER — HEPARIN SODIUM 5000 [USP'U]/.5ML
5000 INJECTION, SOLUTION INTRAVENOUS; SUBCUTANEOUS EVERY 8 HOURS
Status: DISCONTINUED | OUTPATIENT
Start: 2022-11-17 | End: 2022-11-20 | Stop reason: HOSPADM

## 2022-11-16 RX ORDER — ASPIRIN 81 MG/1
243 TABLET, CHEWABLE ORAL ONCE
Status: DISCONTINUED | OUTPATIENT
Start: 2022-11-16 | End: 2022-11-16 | Stop reason: HOSPADM

## 2022-11-16 RX ORDER — SODIUM CHLORIDE, SODIUM LACTATE, POTASSIUM CHLORIDE, CALCIUM CHLORIDE 600; 310; 30; 20 MG/100ML; MG/100ML; MG/100ML; MG/100ML
INJECTION, SOLUTION INTRAVENOUS CONTINUOUS
Status: DISCONTINUED | OUTPATIENT
Start: 2022-11-16 | End: 2022-11-20 | Stop reason: HOSPADM

## 2022-11-16 RX ORDER — OXYCODONE HYDROCHLORIDE 5 MG/1
5 TABLET ORAL EVERY 4 HOURS PRN
Status: CANCELLED | OUTPATIENT
Start: 2022-11-16

## 2022-11-16 RX ORDER — DEXTROSE MONOHYDRATE 25 G/50ML
25-50 INJECTION, SOLUTION INTRAVENOUS
Status: DISCONTINUED | OUTPATIENT
Start: 2022-11-16 | End: 2022-11-16

## 2022-11-16 RX ORDER — ATORVASTATIN CALCIUM 40 MG/1
40 TABLET, FILM COATED ORAL DAILY
Status: DISCONTINUED | OUTPATIENT
Start: 2022-11-16 | End: 2022-11-16

## 2022-11-16 RX ORDER — POLYETHYLENE GLYCOL 3350 17 G/17G
17 POWDER, FOR SOLUTION ORAL DAILY
Status: DISCONTINUED | OUTPATIENT
Start: 2022-11-17 | End: 2022-11-20 | Stop reason: HOSPADM

## 2022-11-16 RX ORDER — LIDOCAINE 4 G/G
1-2 PATCH TOPICAL EVERY 24 HOURS
Status: DISCONTINUED | OUTPATIENT
Start: 2022-11-16 | End: 2022-11-20 | Stop reason: HOSPADM

## 2022-11-16 RX ORDER — VASOPRESSIN IN 0.9 % NACL 2 UNIT/2ML
SYRINGE (ML) INTRAVENOUS PRN
Status: DISCONTINUED | OUTPATIENT
Start: 2022-11-16 | End: 2022-11-16

## 2022-11-16 RX ORDER — NALOXONE HYDROCHLORIDE 0.4 MG/ML
0.2 INJECTION, SOLUTION INTRAMUSCULAR; INTRAVENOUS; SUBCUTANEOUS
Status: DISCONTINUED | OUTPATIENT
Start: 2022-11-16 | End: 2022-11-20 | Stop reason: HOSPADM

## 2022-11-16 RX ORDER — NALOXONE HYDROCHLORIDE 0.4 MG/ML
0.4 INJECTION, SOLUTION INTRAMUSCULAR; INTRAVENOUS; SUBCUTANEOUS
Status: DISCONTINUED | OUTPATIENT
Start: 2022-11-16 | End: 2022-11-20 | Stop reason: HOSPADM

## 2022-11-16 RX ORDER — HEPARIN SODIUM 10000 [USP'U]/100ML
0-5000 INJECTION, SOLUTION INTRAVENOUS CONTINUOUS
Status: DISCONTINUED | OUTPATIENT
Start: 2022-11-16 | End: 2022-11-16

## 2022-11-16 RX ORDER — LIDOCAINE HYDROCHLORIDE 20 MG/ML
INJECTION, SOLUTION INFILTRATION; PERINEURAL PRN
Status: DISCONTINUED | OUTPATIENT
Start: 2022-11-16 | End: 2022-11-16

## 2022-11-16 RX ORDER — CHLORHEXIDINE GLUCONATE ORAL RINSE 1.2 MG/ML
10 SOLUTION DENTAL ONCE
Status: CANCELLED | OUTPATIENT
Start: 2022-11-16 | End: 2022-11-16

## 2022-11-16 RX ORDER — METHOCARBAMOL 500 MG/1
500 TABLET, FILM COATED ORAL EVERY 6 HOURS PRN
Status: DISCONTINUED | OUTPATIENT
Start: 2022-11-16 | End: 2022-11-20 | Stop reason: HOSPADM

## 2022-11-16 RX ORDER — FENTANYL CITRATE 50 UG/ML
25 INJECTION, SOLUTION INTRAMUSCULAR; INTRAVENOUS
Status: CANCELLED | OUTPATIENT
Start: 2022-11-16

## 2022-11-16 RX ORDER — ROSUVASTATIN CALCIUM 20 MG/1
40 TABLET, COATED ORAL DAILY
Status: CANCELLED | OUTPATIENT
Start: 2022-11-16

## 2022-11-16 RX ORDER — LISINOPRIL 10 MG/1
10 TABLET ORAL DAILY
Status: DISCONTINUED | OUTPATIENT
Start: 2022-11-16 | End: 2022-11-16

## 2022-11-16 RX ORDER — IOPAMIDOL 755 MG/ML
80 INJECTION, SOLUTION INTRAVASCULAR ONCE
Status: COMPLETED | OUTPATIENT
Start: 2022-11-16 | End: 2022-11-16

## 2022-11-16 RX ORDER — HYDROMORPHONE HCL IN WATER/PF 6 MG/30 ML
0.2 PATIENT CONTROLLED ANALGESIA SYRINGE INTRAVENOUS
Status: DISCONTINUED | OUTPATIENT
Start: 2022-11-16 | End: 2022-11-20 | Stop reason: HOSPADM

## 2022-11-16 RX ORDER — CEFAZOLIN SODIUM 2 G/100ML
2 INJECTION, SOLUTION INTRAVENOUS
Status: CANCELLED | OUTPATIENT
Start: 2022-11-16

## 2022-11-16 RX ORDER — ONDANSETRON 2 MG/ML
4 INJECTION INTRAMUSCULAR; INTRAVENOUS EVERY 6 HOURS PRN
Status: DISCONTINUED | OUTPATIENT
Start: 2022-11-16 | End: 2022-11-16

## 2022-11-16 RX ORDER — PANTOPRAZOLE SODIUM 40 MG/1
40 TABLET, DELAYED RELEASE ORAL DAILY
Status: DISCONTINUED | OUTPATIENT
Start: 2022-11-17 | End: 2022-11-16

## 2022-11-16 RX ORDER — NALOXONE HYDROCHLORIDE 0.4 MG/ML
0.2 INJECTION, SOLUTION INTRAMUSCULAR; INTRAVENOUS; SUBCUTANEOUS
Status: CANCELLED | OUTPATIENT
Start: 2022-11-16 | End: 2022-11-16

## 2022-11-16 RX ORDER — ASPIRIN 81 MG/1
162 TABLET, CHEWABLE ORAL DAILY
Status: DISCONTINUED | OUTPATIENT
Start: 2022-11-17 | End: 2022-11-20 | Stop reason: HOSPADM

## 2022-11-16 RX ORDER — EPINEPHRINE IN 0.9 % SOD CHLOR 5 MG/250ML
.01-.1 PLASTIC BAG, INJECTION (ML) INTRAVENOUS CONTINUOUS PRN
Status: DISCONTINUED | OUTPATIENT
Start: 2022-11-16 | End: 2022-11-18

## 2022-11-16 RX ORDER — PROCHLORPERAZINE 25 MG
12.5 SUPPOSITORY, RECTAL RECTAL EVERY 12 HOURS PRN
Status: DISCONTINUED | OUTPATIENT
Start: 2022-11-16 | End: 2022-11-16

## 2022-11-16 RX ORDER — POTASSIUM CHLORIDE 1500 MG/1
20 TABLET, EXTENDED RELEASE ORAL
Status: DISCONTINUED | OUTPATIENT
Start: 2022-11-16 | End: 2022-11-16 | Stop reason: HOSPADM

## 2022-11-16 RX ORDER — SODIUM CHLORIDE 9 MG/ML
INJECTION, SOLUTION INTRAVENOUS CONTINUOUS
Status: DISCONTINUED | OUTPATIENT
Start: 2022-11-16 | End: 2022-11-16 | Stop reason: HOSPADM

## 2022-11-16 RX ORDER — LIDOCAINE 40 MG/G
CREAM TOPICAL
Status: DISCONTINUED | OUTPATIENT
Start: 2022-11-16 | End: 2022-11-16 | Stop reason: HOSPADM

## 2022-11-16 RX ORDER — NEOSTIGMINE METHYLSULFATE 1 MG/ML
VIAL (ML) INJECTION PRN
Status: DISCONTINUED | OUTPATIENT
Start: 2022-11-16 | End: 2022-11-16

## 2022-11-16 RX ORDER — VECURONIUM BROMIDE 1 MG/ML
INJECTION, POWDER, LYOPHILIZED, FOR SOLUTION INTRAVENOUS PRN
Status: DISCONTINUED | OUTPATIENT
Start: 2022-11-16 | End: 2022-11-16

## 2022-11-16 RX ORDER — NITROGLYCERIN 5 MG/ML
VIAL (ML) INTRAVENOUS
Status: DISCONTINUED | OUTPATIENT
Start: 2022-11-16 | End: 2022-11-16 | Stop reason: HOSPADM

## 2022-11-16 RX ORDER — BISACODYL 10 MG
10 SUPPOSITORY, RECTAL RECTAL DAILY PRN
Status: DISCONTINUED | OUTPATIENT
Start: 2022-11-16 | End: 2022-11-16

## 2022-11-16 RX ORDER — ONDANSETRON 4 MG/1
4 TABLET, ORALLY DISINTEGRATING ORAL EVERY 6 HOURS PRN
Status: DISCONTINUED | OUTPATIENT
Start: 2022-11-16 | End: 2022-11-16

## 2022-11-16 RX ORDER — CALCIUM GLUCONATE 20 MG/ML
1 INJECTION, SOLUTION INTRAVENOUS
Status: DISCONTINUED | OUTPATIENT
Start: 2022-11-16 | End: 2022-11-20 | Stop reason: HOSPADM

## 2022-11-16 RX ORDER — ROSUVASTATIN CALCIUM 20 MG/1
40 TABLET, COATED ORAL DAILY
Status: DISCONTINUED | OUTPATIENT
Start: 2022-11-16 | End: 2022-11-16

## 2022-11-16 RX ORDER — NICOTINE POLACRILEX 4 MG
15-30 LOZENGE BUCCAL
Status: DISCONTINUED | OUTPATIENT
Start: 2022-11-16 | End: 2022-11-16

## 2022-11-16 RX ORDER — SODIUM CHLORIDE 9 MG/ML
75 INJECTION, SOLUTION INTRAVENOUS CONTINUOUS
Status: CANCELLED | OUTPATIENT
Start: 2022-11-16 | End: 2022-11-16

## 2022-11-16 RX ORDER — OXYCODONE HYDROCHLORIDE 5 MG/1
5 TABLET ORAL EVERY 4 HOURS PRN
Status: DISCONTINUED | OUTPATIENT
Start: 2022-11-16 | End: 2022-11-20 | Stop reason: HOSPADM

## 2022-11-16 RX ORDER — DEXTROSE MONOHYDRATE 25 G/50ML
25-50 INJECTION, SOLUTION INTRAVENOUS
Status: DISCONTINUED | OUTPATIENT
Start: 2022-11-16 | End: 2022-11-17

## 2022-11-16 RX ORDER — ASPIRIN 81 MG/1
162 TABLET, CHEWABLE ORAL
Status: CANCELLED | OUTPATIENT
Start: 2022-11-16

## 2022-11-16 RX ORDER — NITROGLYCERIN 0.4 MG/1
0.4 TABLET SUBLINGUAL EVERY 5 MIN PRN
Status: DISCONTINUED | OUTPATIENT
Start: 2022-11-16 | End: 2022-11-16

## 2022-11-16 RX ORDER — CEFAZOLIN SODIUM/WATER 2 G/20 ML
SYRINGE (ML) INTRAVENOUS PRN
Status: DISCONTINUED | OUTPATIENT
Start: 2022-11-16 | End: 2022-11-16

## 2022-11-16 RX ORDER — PROTAMINE SULFATE 10 MG/ML
INJECTION, SOLUTION INTRAVENOUS PRN
Status: DISCONTINUED | OUTPATIENT
Start: 2022-11-16 | End: 2022-11-16

## 2022-11-16 RX ORDER — CEFAZOLIN SODIUM 1 G/3ML
1 INJECTION, POWDER, FOR SOLUTION INTRAMUSCULAR; INTRAVENOUS EVERY 8 HOURS
Status: COMPLETED | OUTPATIENT
Start: 2022-11-17 | End: 2022-11-17

## 2022-11-16 RX ORDER — POLYETHYLENE GLYCOL 3350 17 G/17G
17 POWDER, FOR SOLUTION ORAL DAILY PRN
Status: DISCONTINUED | OUTPATIENT
Start: 2022-11-16 | End: 2022-11-16

## 2022-11-16 RX ORDER — CARVEDILOL 6.25 MG/1
6.25 TABLET ORAL 2 TIMES DAILY
Status: DISCONTINUED | OUTPATIENT
Start: 2022-11-16 | End: 2022-11-16

## 2022-11-16 RX ORDER — OXYCODONE HYDROCHLORIDE 5 MG/1
10 TABLET ORAL EVERY 4 HOURS PRN
Status: DISCONTINUED | OUTPATIENT
Start: 2022-11-16 | End: 2022-11-20 | Stop reason: HOSPADM

## 2022-11-16 RX ORDER — ACETAMINOPHEN 650 MG/1
650 SUPPOSITORY RECTAL EVERY 6 HOURS PRN
Status: DISCONTINUED | OUTPATIENT
Start: 2022-11-16 | End: 2022-11-16

## 2022-11-16 RX ORDER — IOPAMIDOL 755 MG/ML
INJECTION, SOLUTION INTRAVASCULAR
Status: DISCONTINUED | OUTPATIENT
Start: 2022-11-16 | End: 2022-11-16 | Stop reason: HOSPADM

## 2022-11-16 RX ORDER — HYDRALAZINE HYDROCHLORIDE 20 MG/ML
10 INJECTION INTRAMUSCULAR; INTRAVENOUS EVERY 30 MIN PRN
Status: DISCONTINUED | OUTPATIENT
Start: 2022-11-16 | End: 2022-11-20 | Stop reason: HOSPADM

## 2022-11-16 RX ORDER — NITROGLYCERIN 20 MG/100ML
10-200 INJECTION INTRAVENOUS CONTINUOUS PRN
Status: DISCONTINUED | OUTPATIENT
Start: 2022-11-16 | End: 2022-11-18

## 2022-11-16 RX ORDER — ONDANSETRON 2 MG/ML
INJECTION INTRAMUSCULAR; INTRAVENOUS
Status: DISCONTINUED | OUTPATIENT
Start: 2022-11-16 | End: 2022-11-16 | Stop reason: HOSPADM

## 2022-11-16 RX ORDER — FAMOTIDINE 20 MG/1
20 TABLET, FILM COATED ORAL
Status: CANCELLED | OUTPATIENT
Start: 2022-11-16

## 2022-11-16 RX ORDER — GABAPENTIN 100 MG/1
100 CAPSULE ORAL AT BEDTIME
Status: DISCONTINUED | OUTPATIENT
Start: 2022-11-16 | End: 2022-11-20 | Stop reason: HOSPADM

## 2022-11-16 RX ORDER — CEFAZOLIN SODIUM 2 G/100ML
2 INJECTION, SOLUTION INTRAVENOUS SEE ADMIN INSTRUCTIONS
Status: CANCELLED | OUTPATIENT
Start: 2022-11-16

## 2022-11-16 RX ORDER — AMOXICILLIN 250 MG
1 CAPSULE ORAL 2 TIMES DAILY PRN
Status: DISCONTINUED | OUTPATIENT
Start: 2022-11-16 | End: 2022-11-16

## 2022-11-16 RX ORDER — ACETAMINOPHEN 325 MG/1
650 TABLET ORAL EVERY 6 HOURS PRN
Status: DISCONTINUED | OUTPATIENT
Start: 2022-11-16 | End: 2022-11-16

## 2022-11-16 RX ORDER — PROCHLORPERAZINE MALEATE 5 MG
5 TABLET ORAL EVERY 6 HOURS PRN
Status: DISCONTINUED | OUTPATIENT
Start: 2022-11-16 | End: 2022-11-20 | Stop reason: HOSPADM

## 2022-11-16 RX ORDER — ONDANSETRON 2 MG/ML
4 INJECTION INTRAMUSCULAR; INTRAVENOUS EVERY 6 HOURS PRN
Status: DISCONTINUED | OUTPATIENT
Start: 2022-11-16 | End: 2022-11-20 | Stop reason: HOSPADM

## 2022-11-16 RX ORDER — ACETAMINOPHEN 325 MG/1
650 TABLET ORAL EVERY 4 HOURS PRN
Status: DISCONTINUED | OUTPATIENT
Start: 2022-11-19 | End: 2022-11-20 | Stop reason: HOSPADM

## 2022-11-16 RX ORDER — CALCIUM CARBONATE 500 MG/1
500 TABLET, CHEWABLE ORAL 4 TIMES DAILY PRN
Status: DISCONTINUED | OUTPATIENT
Start: 2022-11-16 | End: 2022-11-20 | Stop reason: HOSPADM

## 2022-11-16 RX ORDER — NICOTINE 21 MG/24HR
1 PATCH, TRANSDERMAL 24 HOURS TRANSDERMAL DAILY
Status: DISCONTINUED | OUTPATIENT
Start: 2022-11-16 | End: 2022-11-16

## 2022-11-16 RX ORDER — ROSUVASTATIN CALCIUM 40 MG/1
40 TABLET, COATED ORAL DAILY
Qty: 90 TABLET | Refills: 3 | Status: SHIPPED | OUTPATIENT
Start: 2022-11-16 | End: 2023-10-12

## 2022-11-16 RX ORDER — FENTANYL CITRATE 50 UG/ML
INJECTION, SOLUTION INTRAMUSCULAR; INTRAVENOUS PRN
Status: DISCONTINUED | OUTPATIENT
Start: 2022-11-16 | End: 2022-11-16

## 2022-11-16 RX ORDER — VERAPAMIL HYDROCHLORIDE 2.5 MG/ML
INJECTION, SOLUTION INTRAVENOUS
Status: DISCONTINUED | OUTPATIENT
Start: 2022-11-16 | End: 2022-11-16 | Stop reason: HOSPADM

## 2022-11-16 RX ORDER — BISACODYL 10 MG
10 SUPPOSITORY, RECTAL RECTAL DAILY PRN
Status: DISCONTINUED | OUTPATIENT
Start: 2022-11-16 | End: 2022-11-20 | Stop reason: HOSPADM

## 2022-11-16 RX ADMIN — ROCURONIUM BROMIDE 50 MG: 50 INJECTION, SOLUTION INTRAVENOUS at 15:38

## 2022-11-16 RX ADMIN — PHENYLEPHRINE HYDROCHLORIDE 200 MCG: 10 INJECTION INTRAVENOUS at 15:40

## 2022-11-16 RX ADMIN — IOPAMIDOL 80 ML: 755 INJECTION, SOLUTION INTRAVENOUS at 01:21

## 2022-11-16 RX ADMIN — MIDAZOLAM HYDROCHLORIDE 2 MG: 1 INJECTION, SOLUTION INTRAMUSCULAR; INTRAVENOUS at 15:32

## 2022-11-16 RX ADMIN — HUMAN INSULIN 2 UNITS/HR: 100 INJECTION, SOLUTION SUBCUTANEOUS at 17:56

## 2022-11-16 RX ADMIN — Medication 1 UNITS: at 18:05

## 2022-11-16 RX ADMIN — PHENYLEPHRINE HYDROCHLORIDE 300 MCG: 10 INJECTION INTRAVENOUS at 15:38

## 2022-11-16 RX ADMIN — PHENYLEPHRINE HYDROCHLORIDE 200 MCG: 10 INJECTION INTRAVENOUS at 15:50

## 2022-11-16 RX ADMIN — PHENYLEPHRINE HYDROCHLORIDE 200 MCG: 10 INJECTION INTRAVENOUS at 18:02

## 2022-11-16 RX ADMIN — FENTANYL CITRATE 100 MCG: 50 INJECTION, SOLUTION INTRAMUSCULAR; INTRAVENOUS at 19:43

## 2022-11-16 RX ADMIN — PROTAMINE SULFATE 300 MG: 10 INJECTION, SOLUTION INTRAVENOUS at 20:14

## 2022-11-16 RX ADMIN — PHENYLEPHRINE HYDROCHLORIDE 100 MCG: 10 INJECTION INTRAVENOUS at 16:51

## 2022-11-16 RX ADMIN — AMINOCAPROIC ACID 5 G/HR: 250 INJECTION, SOLUTION INTRAVENOUS at 16:22

## 2022-11-16 RX ADMIN — HEPARIN SODIUM 30000 UNITS: 1000 INJECTION INTRAVENOUS; SUBCUTANEOUS at 17:47

## 2022-11-16 RX ADMIN — MIDAZOLAM HYDROCHLORIDE 1 MG: 1 INJECTION, SOLUTION INTRAMUSCULAR; INTRAVENOUS at 19:42

## 2022-11-16 RX ADMIN — HYDRALAZINE HYDROCHLORIDE 25 MG: 20 INJECTION INTRAMUSCULAR; INTRAVENOUS at 13:29

## 2022-11-16 RX ADMIN — PHENYLEPHRINE HYDROCHLORIDE 200 MCG: 10 INJECTION INTRAVENOUS at 16:10

## 2022-11-16 RX ADMIN — Medication 2 G: at 19:34

## 2022-11-16 RX ADMIN — PHENYLEPHRINE HYDROCHLORIDE 200 MCG: 10 INJECTION INTRAVENOUS at 17:47

## 2022-11-16 RX ADMIN — NITROGLYCERIN 30 MCG/MIN: 20 INJECTION INTRAVENOUS at 14:50

## 2022-11-16 RX ADMIN — PHENYLEPHRINE HYDROCHLORIDE 0.5 MCG/KG/MIN: 10 INJECTION INTRAVENOUS at 15:41

## 2022-11-16 RX ADMIN — PHENYLEPHRINE HYDROCHLORIDE 100 MCG: 10 INJECTION INTRAVENOUS at 17:30

## 2022-11-16 RX ADMIN — PHENYLEPHRINE HYDROCHLORIDE 100 MCG: 10 INJECTION INTRAVENOUS at 17:45

## 2022-11-16 RX ADMIN — CHLORHEXIDINE GLUCONATE 15 ML: 1.2 SOLUTION ORAL at 23:11

## 2022-11-16 RX ADMIN — Medication 2 G: at 15:34

## 2022-11-16 RX ADMIN — PHENYLEPHRINE HYDROCHLORIDE 200 MCG: 10 INJECTION INTRAVENOUS at 18:03

## 2022-11-16 RX ADMIN — SODIUM CHLORIDE, POTASSIUM CHLORIDE, SODIUM LACTATE AND CALCIUM CHLORIDE: 600; 310; 30; 20 INJECTION, SOLUTION INTRAVENOUS at 15:20

## 2022-11-16 RX ADMIN — MUPIROCIN 0.5 G: 20 OINTMENT TOPICAL at 23:10

## 2022-11-16 RX ADMIN — HEPARIN SODIUM 5000 UNITS: 1000 INJECTION INTRAVENOUS; SUBCUTANEOUS at 18:00

## 2022-11-16 RX ADMIN — VECURONIUM BROMIDE 5 MG: 1 INJECTION, POWDER, LYOPHILIZED, FOR SOLUTION INTRAVENOUS at 16:34

## 2022-11-16 RX ADMIN — FENTANYL CITRATE 150 MCG: 50 INJECTION, SOLUTION INTRAMUSCULAR; INTRAVENOUS at 16:35

## 2022-11-16 RX ADMIN — PHENYLEPHRINE HYDROCHLORIDE 100 MCG: 10 INJECTION INTRAVENOUS at 15:49

## 2022-11-16 RX ADMIN — SODIUM CHLORIDE, SODIUM LACTATE, POTASSIUM CHLORIDE, CALCIUM CHLORIDE: 600; 310; 30; 20 INJECTION, SOLUTION INTRAVENOUS at 15:45

## 2022-11-16 RX ADMIN — PROPOFOL 150 MG: 10 INJECTION, EMULSION INTRAVENOUS at 15:37

## 2022-11-16 RX ADMIN — PHENYLEPHRINE HYDROCHLORIDE 200 MCG: 10 INJECTION INTRAVENOUS at 15:46

## 2022-11-16 RX ADMIN — VECURONIUM BROMIDE 5 MG: 1 INJECTION, POWDER, LYOPHILIZED, FOR SOLUTION INTRAVENOUS at 18:46

## 2022-11-16 RX ADMIN — SODIUM CHLORIDE: 9 INJECTION, SOLUTION INTRAVENOUS at 15:19

## 2022-11-16 RX ADMIN — VECURONIUM BROMIDE 5 MG: 1 INJECTION, POWDER, LYOPHILIZED, FOR SOLUTION INTRAVENOUS at 17:31

## 2022-11-16 RX ADMIN — PHENYLEPHRINE HYDROCHLORIDE 200 MCG: 10 INJECTION INTRAVENOUS at 17:33

## 2022-11-16 RX ADMIN — PROPOFOL 50 MCG/KG/MIN: 10 INJECTION, EMULSION INTRAVENOUS at 20:57

## 2022-11-16 RX ADMIN — PHENYLEPHRINE HYDROCHLORIDE 100 MCG: 10 INJECTION INTRAVENOUS at 16:55

## 2022-11-16 RX ADMIN — FENTANYL CITRATE 100 MCG: 50 INJECTION, SOLUTION INTRAMUSCULAR; INTRAVENOUS at 20:27

## 2022-11-16 RX ADMIN — CARVEDILOL 6.25 MG: 6.25 TABLET, FILM COATED ORAL at 10:48

## 2022-11-16 RX ADMIN — NITROGLYCERIN 10 MCG/MIN: 20 INJECTION INTRAVENOUS at 13:57

## 2022-11-16 RX ADMIN — PHENYLEPHRINE HYDROCHLORIDE 200 MCG: 10 INJECTION INTRAVENOUS at 16:03

## 2022-11-16 RX ADMIN — GLYCOPYRROLATE 0.6 MG: 0.2 INJECTION, SOLUTION INTRAMUSCULAR; INTRAVENOUS at 21:28

## 2022-11-16 RX ADMIN — LIDOCAINE HYDROCHLORIDE 100 MG: 20 INJECTION, SOLUTION INFILTRATION; PERINEURAL at 15:37

## 2022-11-16 RX ADMIN — SODIUM CHLORIDE 80 ML: 900 INJECTION INTRAVENOUS at 01:22

## 2022-11-16 RX ADMIN — HUMAN ALBUMIN MICROSPHERES AND PERFLUTREN 9 ML: 10; .22 INJECTION, SOLUTION INTRAVENOUS at 11:35

## 2022-11-16 RX ADMIN — SODIUM CHLORIDE: 9 INJECTION, SOLUTION INTRAVENOUS at 18:57

## 2022-11-16 RX ADMIN — LIDOCAINE 1 PATCH: 560 PATCH PERCUTANEOUS; TOPICAL; TRANSDERMAL at 23:11

## 2022-11-16 RX ADMIN — EPINEPHRINE 0.03 MCG/KG/MIN: 1 INJECTION INTRAMUSCULAR; INTRAVENOUS; SUBCUTANEOUS at 19:47

## 2022-11-16 RX ADMIN — FENTANYL CITRATE 150 MCG: 50 INJECTION, SOLUTION INTRAMUSCULAR; INTRAVENOUS at 15:37

## 2022-11-16 RX ADMIN — PHENYLEPHRINE HYDROCHLORIDE 100 MCG: 10 INJECTION INTRAVENOUS at 19:59

## 2022-11-16 RX ADMIN — SODIUM CHLORIDE, POTASSIUM CHLORIDE, SODIUM LACTATE AND CALCIUM CHLORIDE: 600; 310; 30; 20 INJECTION, SOLUTION INTRAVENOUS at 22:40

## 2022-11-16 RX ADMIN — SODIUM CHLORIDE: 9 INJECTION, SOLUTION INTRAVENOUS at 15:45

## 2022-11-16 RX ADMIN — ASPIRIN 81 MG CHEWABLE TABLET 324 MG: 81 TABLET CHEWABLE at 00:55

## 2022-11-16 RX ADMIN — Medication 1 UNITS: at 18:06

## 2022-11-16 RX ADMIN — PHENYLEPHRINE HYDROCHLORIDE 200 MCG: 10 INJECTION INTRAVENOUS at 17:51

## 2022-11-16 RX ADMIN — PHENYLEPHRINE HYDROCHLORIDE 200 MCG: 10 INJECTION INTRAVENOUS at 15:43

## 2022-11-16 RX ADMIN — PHENYLEPHRINE HYDROCHLORIDE 200 MCG: 10 INJECTION INTRAVENOUS at 17:57

## 2022-11-16 RX ADMIN — HEPARIN SODIUM 850 UNITS/HR: 10000 INJECTION, SOLUTION INTRAVENOUS at 02:21

## 2022-11-16 RX ADMIN — MIDAZOLAM HYDROCHLORIDE 2 MG: 1 INJECTION, SOLUTION INTRAMUSCULAR; INTRAVENOUS at 16:36

## 2022-11-16 RX ADMIN — NEOSTIGMINE METHYLSULFATE 35 MG: 1 INJECTION, SOLUTION INTRAVENOUS at 21:28

## 2022-11-16 RX ADMIN — NITROGLYCERIN 1 PATCH: 0.4 PATCH TRANSDERMAL at 02:55

## 2022-11-16 ASSESSMENT — ENCOUNTER SYMPTOMS
DIAPHORESIS: 0
ABDOMINAL PAIN: 0
SHORTNESS OF BREATH: 1
NAUSEA: 0
BACK PAIN: 1

## 2022-11-16 ASSESSMENT — ACTIVITIES OF DAILY LIVING (ADL)
HEARING_DIFFICULTY_OR_DEAF: NO
TOILETING_ISSUES: NO
ADLS_ACUITY_SCORE: 35
WALKING_OR_CLIMBING_STAIRS_DIFFICULTY: NO
CHANGE_IN_FUNCTIONAL_STATUS_SINCE_ONSET_OF_CURRENT_ILLNESS/INJURY: NO
ADLS_ACUITY_SCORE: 20
ADLS_ACUITY_SCORE: 35
ADLS_ACUITY_SCORE: 35
ADLS_ACUITY_SCORE: 20
FALL_HISTORY_WITHIN_LAST_SIX_MONTHS: NO
ADLS_ACUITY_SCORE: 20
ADLS_ACUITY_SCORE: 20
ADLS_ACUITY_SCORE: 35
ADLS_ACUITY_SCORE: 20
ADLS_ACUITY_SCORE: 35
ADLS_ACUITY_SCORE: 20
WEAR_GLASSES_OR_BLIND: NO
DIFFICULTY_COMMUNICATING: NO
CONCENTRATING,_REMEMBERING_OR_MAKING_DECISIONS_DIFFICULTY: NO
DOING_ERRANDS_INDEPENDENTLY_DIFFICULTY: YES
DIFFICULTY_EATING/SWALLOWING: NO
ADLS_ACUITY_SCORE: 20
DRESSING/BATHING_DIFFICULTY: NO

## 2022-11-16 ASSESSMENT — COPD QUESTIONNAIRES: COPD: 0

## 2022-11-16 ASSESSMENT — LIFESTYLE VARIABLES: TOBACCO_USE: 1

## 2022-11-16 NOTE — CONSULTS
Inpatient Cardiology Consultation.   [facility]  Date of Admission: 2022  Date of Consult: 2022    PRIMARY CARDIOLOGY TEAM:  New to cardiology.    REASON FOR CONSULT:  Non-ST elevation myocardial infarction.    HISTORY OF PRESENT ILLNESS:  Rodrigo Lanier is a 67 year old male admitted on 2022 who does not get that regular medical care and not on any regular medications.     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  Work-up revealed new diagnoses of uncontrolled hypertension, new diagnosis of type 2 diabetes mellitus (HbA1c 6.6% with glucose of 230) elevated troponin of 1600 and inferior infarct (Q waves, no ST elevation) on ECG.    Cardiology is being consulted for management of non-ST elevation myocardial infarction.     He is chest pain-free on IV heparin.  Initial troponin 800, increased to 1600. In the ER, he was significantly hypertensive in the 180s/110s, normal sinus rhythm in the 70s, not hypoxemic (sats 96%).    Risk factors are age 67 years, undiagnosed/untreated hypertension, undiagnosed/untreated type 2 diabetes mellitus, hyperlipidemia (, triglycerides 227), tobacco dependency, probable undiagnosed sleep apnea, family history of premature coronary artery disease (his father  suddenly at home at age 55 years from a presumed myocardial infarction, mother had coronary artery bypass grafting at age 74 years).      DIAGNOSTIC DATA:  I have personally reviewed labs, ECGs, chest x-ray images, echocardiogram images, CT chest.    Labs: High-sensitivity troponin I 800 -- 1600, normal renal panel with a creatinine of 0.68, normal liver enzymes, total cholesterol 258, HDL 40, , triglycerides 227, hemoglobin A1c 6.6%, normal hemoglobin and platelets.    ECG: Sinus  rhythm of 68 bpm.  Left ventricular hypertrophy with LV strain pattern.  Inferior infarct.    Echocardiogram: Ordered and pending.    CT chest:  Normal aorta. Negative for aneurysm or dissection.  Significant coronary artery calcification.  Right renal pelvis stone.    DIAGNOSES:  1.  New diagnosis of non-ST elevation myocardial infarction.  2.  New diagnosis of benign essential hypertension.  3.  New diagnosis of type 2 diabetes mellitus, HbA1c 6.6%.  4.  New diagnosis of mixed dyslipidemia with low HDL, very high LDL of 173, triglycerides 227.  Not on lipid-lowering therapy previously.  5.  Tobacco dependency.  6.  History of snoring.  Needs outpatient sleep study.  7.  Family history of coronary artery disease and fatal myocardial infarction in father at age 55 years.  Mother had CABG in her 70s.  8.  New diagnosis of renal stone.  May need downstream urology procedure.     ASSESSMENT:  This is a 67-year-old  male with a 1 week history of classical unstable angina, multiple untreated risk factors, concerning family history of premature fatal myocardial infarction in father, who presents with a non-ST elevation myocardial infarction.  On his ECG, he has evidence of a completed inferior infarct, so unsure if this is subacute or remote.  He currently does not have ST elevation.    PLAN:  1.  I have ordered urgent heart catheterization and intervention, as needed.  Risks and benefits discussed and informed consent obtained.  No history of contrast allergy. Normal renal function. Full code resuscitation status.      2. Note patient has an incidental renal stone (no symptoms, normal renal function). I personally discussed with the urology [facility].  Per urology [facility]UR[facility], he will likely need a urology procedure.  My recommendation is that he  undergo the angiogram now.  The [facility] is aware that in case the patient has coronary stenting, we will not be able to discontinue dual antiplatelet therapy for up to 3 months  depending on the location and type of stenting.     3.  Please continue IV heparin, aspirin.    4.  Hypertension management:  - Start carvedilol 6.25 mg 2 times daily and lisinopril 20 mg daily.    5.  Hyperlipidemia management:  - Start rosuvastatin 40 mg daily.    5.  Nicotine cessation counseling.    6.  I have ordered a transthoracic echocardiogram.    5.  Cardiology will follow.  Thank you for consulting us.      HIGH COMPLEXITY MEDICAL DECISION MAKING and care coordination for treatment of acute myocardial infarction, hyperlipidemia, hypertension and releasing with the urology service.      Marissa Salmeron MD, MD [facility]  Cardiology        CODE STATUS:  Full Code      REVIEW OF SYSTEMS:  A comprehensive 10 point review of systems was completed and the pertinent positives are documented in history of present illness.    FAMILY HISTORY:  Family History   Problem Relation Age of Onset     CABG Mother 74     Diabetes Father      Coronary Artery Disease Father 55         at home from a presumed fatal myocardial infarction at age 55 years.     No Known Problems Sister      No Known Problems Sister      No Known Problems Sister      No Known Problems Brother      No Known Problems Brother      No Known Problems Brother      No Known Problems Brother      No Known Problems Brother      No Known Problems Brother      Cancer Brother      Arrhythmia No family hx of      Cardiomyopathy No family hx of        MEDICATIONS:  None       HOME MEDICATIONS:  None       ALLERGIES:  No Known Allergies    PAST MEDICAL HISTORY:  Past Medical History:   Diagnosis Date     Mixed hyperlipidemia      Nicotine dependence      Snoring        PAST SURGICAL HISTORY:  Past Surgical History:   Procedure Laterality Date     ELBOW SURGERY  2002     FINGER SURGERY      thumb     LEG SURGERY      femur     STRABISMUS SURGERY         SOCIAL HISTORY:   Rodrigo Lanier  reports that he has been smoking cigarettes. He has a 22.50 pack-year  smoking history. He has never used smokeless tobacco. He reports that he does not use drugs.      PHYSICAL EXAMINATION:  Temp: 97.9  F (36.6  C) Temp src: Oral BP: (!) 140/80 Pulse: 81   Resp: 12 SpO2: 95 % O2 Device: None (Room air)    No intake/output data recorded.  Vitals:    11/16/22 0020   Weight: 72.6 kg (160 lb)       Constitutional: Comfortable at rest. Cooperative, alert, well developed, well nourished. Normal BMI.  Eyes: Pupils reactive, no pallor or icterus.  ENT: No cyanosis or pallor.  Moist mucous membranes.  Cardiovascular: Normal jugular venous pulse and pressure.  Normal carotid pulse character and volume.  No carotid bruit.  Normal apical impulse.  Regular heart sounds.  No S3 or S4.  No murmur or friction rub.  Respiratory: Normal respiratory effort with symmetrical chest wall movements and no use of accessory muscles. Bilateral normal breath sounds. No rales or wheeze.  GI: Soft, non-tender, active bowel sounds.  No hepatosplenomegaly, ascites or abdominal wall edema.  Skin: No erythema or ecchymosis. No pertinent skin findings.  Musculoskeletal: Normal muscle tone and strength.   Neuropsychiatric: Oriented to time place and person.  Affect normal.  No gross motor deficits.  Extremities: No edema or clubbing.    Clinically Significant Risk Factors Present on Admission             # Hypoalbuminemia: Lowest albumin = 3.3 g/dL (Ref range: 3.5-5.2) at 11/16/2022  6:32 AM, will monitor as appropriate      # DMII: A1C = 6.6 % (Ref range: 0.0 - 5.6 %) within past 3 months          Marissa Salmeron MD, MD

## 2022-11-16 NOTE — ANESTHESIA PREPROCEDURE EVALUATION
Anesthesia Pre-Procedure Evaluation    Patient: Rdorigo Ward   MRN: [id number] : 1955        Procedure : Procedure(s):  CORONARY ARTERY BYPASS GRAFT (CABG)          Past Medical History:   Diagnosis Date     Mixed hyperlipidemia      Nicotine dependence      Snoring       Past Surgical History:   Procedure Laterality Date     ELBOW SURGERY  2002     FINGER SURGERY      thumb     LEG SURGERY      femur     STRABISMUS SURGERY        No Known Allergies   Social History     Tobacco Use     Smoking status: Every Day     Packs/day: 0.50     Years: 45.00     Pack years: 22.50     Types: Cigarettes     Smokeless tobacco: Never   Substance Use Topics     Alcohol use: Not on file     Comment: 2-4 beers per month.      Wt Readings from Last 1 Encounters:   22 72.6 kg (160 lb)        Anesthesia Evaluation   Pt has had prior anesthetic. Type: General.    No history of anesthetic complications       ROS/MED HX  ENT/Pulmonary:     (+) tobacco use, Current use,  (-) asthma, COPD, sleep apnea and vocal abnormalities   Neurologic:  - neg neurologic ROS     Cardiovascular:     (+) Dyslipidemia hypertension--CAD angina-at rest. past MI --Previous cardiac testing   Echo: Date: 22 Results:  [facility]  Echocardiography Laboratory  [address] [place] [address]     Name: RODRIGO WARD  MRN: [id number]  : 1955  Study Date: 2022 10:50 AM  Age: 67 yrs  Gender: Male  Patient Location: [facility]  Reason For Study: Chest Tightness  Ordering Physician: VIKKI QUINTANILLA  Referring Physician: Virginia Winters  Performed By: Nelson Rodarte CATHIE     BSA: 1.9 m2  Height: 68 in  Weight: 160 lb  HR: 71  BP: 140/80 mmHg  ______________________________________________________________________________  Procedure  Complete Portable Echo Adult. Optison (NDC #1538-4370) given intravenously.  ______________________________________________________________________________  Interpretation  Summary     The left ventricle is normal in size.  There is mild concentric left ventricular hypertrophy.  The visual ejection fraction is 50-55%.  There is moderate inferolateral wall hypokinesis.  There is moderate lateral wall hypokinesis.  There is mild to moderate inferior wall hypokinesis.  No significant valvular heart disease.  ______________________________________________________________________________  Left Ventricle  The left ventricle is normal in size. There is mild concentric left  ventricular hypertrophy. The visual ejection fraction is 50-55%. Grade I or  early diastolic dysfunction. There is moderate inferolateral wall hypokinesis.  There is moderate lateral wall hypokinesis. There is mild to moderate inferior  wall hypokinesis.     Right Ventricle  The right ventricle is normal in size and function.     Atria  Normal left atrial size. Right atrial size is normal. There is no color  Doppler evidence of an atrial shunt.     Mitral Valve  The mitral valve leaflets are mildly thickened. There is mild (1+) mitral  regurgitation.     Tricuspid Valve  There is trace tricuspid regurgitation. IVC diameter and respiratory changes  fall into an intermediate range suggesting an RA pressure of 8 mmHg.     Aortic Valve  There is trivial trileaflet aortic sclerosis. No aortic regurgitation is  present.     Pulmonic Valve  There is trace pulmonic valvular regurgitation.     Vessels  Normal size aorta. Borderline aortic root dilatation.     Pericardium  There is no pericardial effusion.     Rhythm  Sinus rhythm was noted.  ______________________________________________________________________________  MMode/2D Measurements & Calculations  IVSd: 1.3 cm     LVIDd: 4.8 cm  LVIDs: 3.5 cm  LVPWd: 0.93 cm  FS: 28.6 %  LV mass(C)d: 195.2 grams  LV mass(C)dI: 105.0 grams/m2  Ao root diam: 3.7 cm  LA dimension: 3.8 cm  Stress Test: Date: Results:    ECG Reviewed: Date: Results:    Cath: Date: Results:   (-) murmur    METS/Exercise Tolerance:     Hematologic:  - neg hematologic  ROS     Musculoskeletal:       GI/Hepatic:    (-) GERD and liver disease   Renal/Genitourinary:     (+) Nephrolithiasis ,  (-) renal disease   Endo:     (+) type II DM, Last HgA1c: 6.5,     Psychiatric/Substance Use:       Infectious Disease:       Malignancy:       Other:            Physical Exam    Airway        Mallampati: II   TM distance: > 3 FB   Neck ROM: full   Mouth opening: > 3 cm    Respiratory Devices and Support         Dental     Comment: Upper and lower incisor area partials    (+) partials      Cardiovascular          Rhythm and rate: regular and normal (-) no murmur    Pulmonary   pulmonary exam normal                OUTSIDE LABS:  CBC:   Lab Results   Component Value Date    WBC 12.8 (H) 11/16/2022    HGB 16.8 11/16/2022    HCT 48.0 11/16/2022     11/16/2022     BMP:   Lab Results   Component Value Date     11/16/2022    POTASSIUM 3.7 11/16/2022    CHLORIDE 105 11/16/2022    CO2 20 11/16/2022    BUN 15 11/16/2022    CR 0.68 11/16/2022     (H) 11/16/2022     COAGS: No results found for: PTT, INR, FIBR  POC: No results found for: BGM, HCG, HCGS  HEPATIC:   Lab Results   Component Value Date    ALBUMIN 3.3 (L) 11/16/2022    PROTTOTAL 6.6 (L) 11/16/2022    ALT 30 11/16/2022    AST 32 11/16/2022    ALKPHOS 77 11/16/2022    BILITOTAL 0.8 11/16/2022     OTHER:   Lab Results   Component Value Date    A1C 6.6 (H) 11/16/2022    LINDA 8.8 11/16/2022       Anesthesia Plan    ASA Status:  4, emergent    NPO Status:  ELEVATED Aspiration Risk/Unknown    Anesthesia Type: General.     - Airway: ETT   Induction: Intravenous, RSI.   Maintenance: Inhalation.   Techniques and Equipment:     - Lines/Monitors: Arterial Line, Central Line, DIOR, PAC            DIOR Absolute Contra-indication: NONE            DIOR Relative Contra-indication: NONE     - Blood: Blood in Room     - Drips/Meds: Fentanyl, Phenylephrine, Epinephrine      Consents    Anesthesia Plan(s) and associated risks, benefits, and realistic alternatives discussed. Questions answered and patient/representative(s) expressed understanding.    - Discussed:     - Discussed with:  Patient      - Extended Intubation/Ventilatory Support Discussed: Yes.      - Patient is DNR/DNI Status: No    Use of blood products discussed: Yes.     - Discussed with: Patient.     - Consented: consented to blood products            Reason for refusal: other.     Postoperative Care    Pain management: IV analgesics.   PONV prophylaxis: Ondansetron (or other 5HT-3), Dexamethasone or Solumedrol     Comments:                Ramsey Brown MD

## 2022-11-16 NOTE — CONSULTS
[facility] Inpatient Consultation Note    Rodrigo Lanier MRN# [id number]   Age: 67 year old YOB: 1955     Date of Admission:  11/16/2022    Reason for consult: Obstructing Nephrolithiasis        Requesting physician: Dr. Degroot                History of Present Illness:   Rodrigo Lanier is a 67 year old male who initially presented to the ED for left arm pain, chest pain, and shortness of breath. His symptoms had be intermittent for 2-3 days prior to his arrival, though notes he was having on and off discomfort that was similar for many months. He additionally noted minor back pain prior to arrival, though notes occasional back pain over the past couple of months as well. While in the ED, extensive work up was performed. Initial EKD with inferior Q waves concerning ischemia. Troponin was elevated at 800. CTA was obtained, notable for severe coronary artery disease. However also notable for a 1.3 cm curvilinear stone at the right renal pelvis with some mild prominence of the right renal pelvis, low grade obstruction could not be excluded. Also with additional non obstructing stones within the right kidney.Initial creatinine 0.68. UA with large blood (72 RBCs), trace leukocyte estrace, 11 WBC. Urine culture was ordered and is pending at this time. CBC with mild leucocytosis (12.8)The patient was not having any urinary symptoms concerning for infection at that time.  He was started on a heparin drip, nitroglycerin patch, and admitted for NSTEMI.     Since his arrival that patient notes that he has not had any flank pain. He denies a past history of nephrolithiasis/uretoerlothias that he is aware of. He further denies gross hematuria, dysuria, urinary urgency or frequency, abdominal pain, nausea, and vomiting. Repeat creatinine 0.68, unchanged over the past day. Unfortunately troponin levels have continued to rise during his admission, last being 1675. Cariology is taking the patient for coronary  angiogram today and notes that he will be on dual antiplatelet therapy after this. Potential need for CABG given CTA findings.             Past Medical History:     Past Medical History:   Diagnosis Date     Mixed hyperlipidemia      Nicotine dependence      Snoring              Past Surgical History:     Past Surgical History:   Procedure Laterality Date     ELBOW SURGERY  2002     FINGER SURGERY      thumb     LEG SURGERY      femur     STRABISMUS SURGERY               Social History:     Social History     Socioeconomic History     Marital status: Single     Spouse name: Not on file     Number of children: Not on file     Years of education: Not on file     Highest education level: Not on file   Occupational History     Not on file   Tobacco Use     Smoking status: Every Day     Packs/day: 0.50     Years: 45.00     Pack years: 22.50     Types: Cigarettes     Smokeless tobacco: Never   Substance and Sexual Activity     Alcohol use: Not on file     Comment: 2-4 beers per month.     Drug use: Never     Sexual activity: Not on file   Other Topics Concern     Not on file   Social History Narrative     Not on file     Social Determinants of Health     Financial Resource Strain: Not on file   Food Insecurity: Not on file   Transportation Needs: Not on file   Physical Activity: Not on file   Stress: Not on file   Social Connections: Not on file   Intimate Partner Violence: Not on file   Housing Stability: Not on file             Family History:     Family History   Problem Relation Age of Onset     CABG Mother 74     Diabetes Father      Coronary Artery Disease Father 55         at home from a presumed fatal myocardial infarction at age 55 years.     No Known Problems Sister      No Known Problems Sister      No Known Problems Sister      No Known Problems Brother      No Known Problems Brother      No Known Problems Brother      No Known Problems Brother      No Known Problems Brother      No Known Problems Brother       Cancer Brother      Arrhythmia No family hx of      Cardiomyopathy No family hx of              Immunizations:     Immunization History   Administered Date(s) Administered     Influenza Quad, Recombinant, pf(RIV4) (Flublok) 12/11/2018     Influenza Vaccine IM > 6 months Valent IIV4 (Alfuria,Fluzone) 10/16/2015, 10/27/2017     Influenza, Quad, High Dose, Pf, 65yr+ (Fluzone HD) 11/24/2020     Tdap (Adacel,Boostrix) 10/16/2015             Allergies:   No Known Allergies          Medications:     Current Facility-Administered Medications   Medication     acetaminophen (TYLENOL) tablet 650 mg    Or     acetaminophen (TYLENOL) Suppository 650 mg     [START ON 11/17/2022] aspirin EC tablet 81 mg     Aspirin ONE TIME doses given prior to hospital arrival or in ER     bisacodyl (DULCOLAX) suppository 10 mg     carvedilol (COREG) tablet 6.25 mg     glucose gel 15-30 g    Or     dextrose 50 % injection 25-50 mL    Or     glucagon injection 1 mg     heparin 25,000 units in 0.45% NaCl 250 mL ANTICOAGULANT infusion     HOLD: nitroGLYcerin IF     insulin aspart (NovoLOG) injection (RAPID ACTING)     lidocaine (LMX4) cream     lidocaine 1 % 0.1-1 mL     [START ON 11/17/2022] lisinopril (ZESTRIL) tablet 20 mg     medication instruction     melatonin tablet 1 mg     naloxone (NARCAN) injection 0.2 mg    Or     naloxone (NARCAN) injection 0.4 mg    Or     naloxone (NARCAN) injection 0.2 mg    Or     naloxone (NARCAN) injection 0.4 mg     nicotine (NICODERM CQ) 14 MG/24HR 24 hr patch 1 patch     nicotine (NICORETTE) gum 2 mg     nicotine Patch in Place     nitroGLYcerin (NITRODUR) 0.4 MG/HR 24 hr patch 1 patch     nitroGLYcerin (NITRODUR) Patch in Place     nitroGLYcerin (NITROSTAT) sublingual tablet 0.4 mg     nitroGLYcerin 50 mg in D5W 250 mL PERIPHERAL IV infusion     ondansetron (ZOFRAN ODT) ODT tab 4 mg    Or     ondansetron (ZOFRAN) injection 4 mg     oxyCODONE IR (ROXICODONE) half-tab 2.5 mg     Patient is already receiving  "anticoagulation with heparin, enoxaparin (LOVENOX), warfarin (COUMADIN)  or other anticoagulant medication     polyethylene glycol (MIRALAX) Packet 17 g     prochlorperazine (COMPAZINE) injection 5 mg    Or     prochlorperazine (COMPAZINE) tablet 5 mg    Or     prochlorperazine (COMPAZINE) suppository 12.5 mg     rosuvastatin (CRESTOR) tablet 40 mg     senna-docusate (SENOKOT-S/PERICOLACE) 8.6-50 MG per tablet 1 tablet    Or     senna-docusate (SENOKOT-S/PERICOLACE) 8.6-50 MG per tablet 2 tablet     sodium chloride (PF) 0.9% PF flush 3 mL     sodium chloride (PF) 0.9% PF flush 3 mL             Review of Systems:   Comprehensive review of systems from the Admission note dated 11/16/22 at [facility] was reviewed with no changes except per HPI.               Examination:  BP (!) 140/80   Pulse 81   Temp 97.9  F (36.6  C) (Oral)   Resp 16   Ht 1.727 m (5' 8\")   Wt 72.6 kg (160 lb)   SpO2 95%   BMI 24.33 kg/m    General: Alert and oriented, no distress  HEENT: Face symmetric, mucous membranes moist and pink  Eyes: No scleral icterus  Neck: Symmetric  Chest wall: Symmetric  Respiratory: Breathing unlabored, no audible wheezing  Cardiac: Extremities warm and well perfused, no edema  Abdomen: soft, non tender, non distended; no rebound, guarding or peritoneal signs  Back: No CVA or flank tenderness  Extremities: No evidence of deformities or trauma  Neuro:Grossly non focal  Pysch: Normal mood and affect  Skin: No evident rashes or lesions            Data:     Lab Results   Component Value Date    WBC 12.8 11/16/2022     Lab Results   Component Value Date    RBC 5.65 11/16/2022     Lab Results   Component Value Date    HGB 16.8 11/16/2022     Lab Results   Component Value Date    HCT 48.0 11/16/2022     Lab Results   Component Value Date     11/16/2022     Creatinine   Date Value Ref Range Status   11/16/2022 0.68 0.66 - 1.25 mg/dL Final   ]  Lab Results   Component Value Date    BUN 15 " 11/16/2022       Imaging:  IMPRESSION:   1.  Normal aorta. Negative for aneurysm or dissection. No acute vascular findings.       2.  Severe coronary artery calcification.       3.  Question some relative left ventricular wall thickening. Follow-up echocardiogram could be obtained if indicated.       4.  Subtle diffuse hazy ground-glass opacity in the lower lungs, right greater than left, nonspecific and could be seen with some mild edema versus less likely infiltrate.       5.  There is a more focal 1 cm ground-glass nodular opacity in the right lung anteriorly. Based on Fleischner criteria guidelines, follow-up CT in 6-12 months suggested for reevaluation.       6. 1.3 cm curvilinear stone present in the right renal pelvis with some mild prominence of the right renal pelvis. Cannot exclude some mild to low-grade obstruction as a result of this stone. Given this appearance and location of the stone, recommend    urologic consultation for further management options. Additional nonobstructing stones within the right kidney.       7. Prostatic enlargement.       Impression:  68 yo male with NSTEMI undergoing coronary angiogram with potential need for CABG today with a 1.3 cm stone at the right renal pelvis with mild prominence, potential low grade obstruction. He is starting dual antiplatelet therapy. Patient is not having flank pain and kidney function has been stable.     Plan:  -Prioritize cardiac interventions at this time. Going for coronary angiogram today and given findings on CTA potential need for CABG.   -Discussing with our surgical scheduling team potential for ureteral stent placement on Friday (11/18/22). Working with OR for scheduling. This is of course pending the patient's outcome and course following coronary angiogram today.   -The patient is not having flank pain and kidney function is stable. He does not have signs of UTI. No emergent intervention needed today. Would favor medical optimization  from a cardiac standpoint prior to any procedure.    -Should patient become symptomatic or renal function impacted, will need to reevaluate.     Patient reviewed and discussed with Dr. Ibarra.     Mike Arias PA-C  [facility] ([facility])  [phone]  [employer] ([email or web address])   Text Page (7:30am to 4:30pm)

## 2022-11-16 NOTE — ED NOTES
"[facility]  ED Nurse Handoff Report    ED Chief complaint: Chest Pain      ED Diagnosis:   Final diagnoses:   NSTEMI (non-ST elevated myocardial infarction) (H)   Other specified diabetes mellitus with other specified complication, without long-term current use of insulin (H)   Benign essential hypertension       Code Status: Full Code    Allergies: No Known Allergies    Patient Story: Patient coming in with several days of left arm pain with SOB. Came in this evening due to left sided chest pain.      Vital signs:  Temp: 97.5  F (36.4  C) Temp src: Temporal BP: (!) 175/108 Pulse: 75   Resp: 27 SpO2: 97 % O2 Device: None (Room air)   Height: 172.7 cm (5' 8\") Weight: 72.6 kg (160 lb)  Estimated body mass index is 24.33 kg/m  as calculated from the following:    Height as of this encounter: 1.727 m (5' 8\").    Weight as of this encounter: 72.6 kg (160 lb).    Abnormal Labs Resulted from Time of ED Arrival to Time of ED Departure   BASIC METABOLIC PANEL - Abnormal       Result Value    Sodium 136      Potassium 3.7      Chloride 105      Carbon Dioxide (CO2) 20      Anion Gap 11      Urea Nitrogen 15      Creatinine 0.68      Calcium 8.8      Glucose 230 (*)     GFR Estimate >90     TROPONIN I - Abnormal    Troponin I High Sensitivity 809 (*)    CBC WITH PLATELETS AND DIFFERENTIAL - Abnormal    WBC Count 12.8 (*)     RBC Count 5.65      Hemoglobin 16.8      Hematocrit 48.0      MCV 85      MCH 29.7      MCHC 35.0      RDW 11.9      Platelet Count 234      % Neutrophils 77      % Lymphocytes 18      % Monocytes 5      % Eosinophils 0      % Basophils 0      % Immature Granulocytes 0      NRBCs per 100 WBC 0      Absolute Neutrophils 9.6 (*)     Absolute Lymphocytes 2.4      Absolute Monocytes 0.7      Absolute Eosinophils 0.0      Absolute Basophils 0.1      Absolute Immature Granulocytes 0.1      Absolute NRBCs 0.0     HEMOGLOBIN A1C - Abnormal    Hemoglobin A1C 6.6 (*)      Focused Assessment:  " Troponin >800, Changes to EKG. Hypertensive. Patient denies active chest pain    Treatments and/or interventions provided:patch, heprin gtt   Patient's response to treatments and/or interventions:  patient continues to deny chest pain    To be done/followed up on inpatient unit:  Monitor anitXa, Angio in AM     Does this patient have any cognitive concerns?: None     Activity level - Baseline/Home:  Independent  Activity Level - Current:   Independent    Patient's Preferred language: English   Needed?: No    Isolation: None  Infection: Not Applicable  Patient tested for COVID 19 prior to admission: YES  Bariatric?: No    Vital Signs:   Vitals:    11/16/22 0050 11/16/22 0100 11/16/22 0132 11/16/22 0145   BP: (!) 178/112 (!) 167/93 (!) 177/111 (!) 175/108   Pulse: 75 82 81 75   Resp: 26 17 22 27   Temp:       TempSrc:       SpO2: 96% 97%  97%   Weight:       Height:           Cardiac Rhythm:     Was the PSS-3 completed:   Yes  What interventions are required if any?               Family Comments: Family at bedside   OBS brochure/video discussed/provided to patient/family: No             For the majority of the shift this patient's behavior was Green.   Behavioral interventions performed were None     ED NURSE PHONE NUMBER:

## 2022-11-16 NOTE — ANESTHESIA PROCEDURE NOTES
Airway       Patient location during procedure: OR       Procedure Start/Stop Times: 11/16/2022 3:39 PM  Staff -        Anesthesiologist:  Ramsey Brown MD       CRNA: Hodgkins, Christine Marie Volp, APRN CRNA       Performed By: CRNA  Consent for Airway        Urgency: elective  Indications and Patient Condition       Indications for airway management: lucas-procedural       Induction type:intravenous       Mask difficulty assessment: 2 - vent by mask + OA or adjuvant +/- NMBA    Final Airway Details       Final airway type: endotracheal airway       Successful airway: Single subglottic suction  Endotracheal Airway Details        ETT size (mm): 8.0       Cuffed: yes       Successful intubation technique: video laryngoscopy       VL Blade Size: [place] 4       Grade View of Cords: 1       Adjucts: stylet       Position: Left       Measured from: lips       Secured at (cm): 24       Bite block used: None    Post intubation assessment        Placement verified by: capnometry, equal breath sounds and chest rise        Number of attempts at approach: 1       Number of other approaches attempted: 0       Secured with: pink tape       Ease of procedure: easy       Dentition: Intact and Unchanged    Medication(s) Administered   Medication Administration Time: 11/16/2022 3:39 PM

## 2022-11-16 NOTE — PROGRESS NOTES
Patient admitted to heart center from ER about 8am. Tele sinus rhythm.  Patient NPO for possible angio. Echo done this morning. Denied pain while in heart center. Cath lab staff arrived about 1300 to take patient to cath lab. Appears he is getting IABP and going to OR and then will be in ICU.

## 2022-11-16 NOTE — ANESTHESIA PROCEDURE NOTES
Central Line/PA Catheter Placement    Pre-Procedure   Staff -        Anesthesiologist:  Ramsey Brown MD       Performed By: anesthesiologist       Location: pre-op       Pre-Anesthestic Checklist: patient identified, IV checked, site marked, risks and benefits discussed, informed consent, monitors and equipment checked and pre-op evaluation  Timeout:       Correct Patient: Yes        Correct Procedure: Yes        Correct Site: Yes        Correct Position: Yes        Correct Laterality: N/A   Line Placement:   This line was placed Post Induction    Procedure   Procedure: central line       Laterality: right       Insertion Site: internal jugular.       Patient Position: Trendelenburg  Sterile Prep        All elements of maximal sterile barrier technique followed       Patient Prep/Sterile Barriers: draped, patient draped, hand hygiene, gloves , hat , mask , draped, gown, sterile gel and probe cover       Skin prep: Chloraprep  Insertion/Injection        Local skin infiltrated with mL of 1% lidocaine.        Technique: ultrasound guided and Seldinger Technique        1. Ultrasound was used to evaluate the access site.       2. Vein evaluated via ultrasound for patency/adequacy.       3. Using real-time ultrasound the needle/catheter was observed entering the artery/vein.       4. Permanent image was captured and entered into the patient's record.       5. The visualized structures were anatomically normal.       6. There were no apparent abnormal pathologic findings.       Introducer Type: 9 Fr, 10 cm        Type: PA/CVC with Introducer       PA Catheter Type: CCO         Appropriate RV, RA and PA waveforms noted:  Yes            Distance to Wedge Position cm: 52            Withdrawn and Locked at cm: 48            Balloon down at end of the procedure:   Narrative         Secured by: suture       Tegaderm and Biopatch dressing used.       Complications: None apparent,        blood aspirated from all lumens,         All lumens flushed: Yes       Verification method: Placement to be verified post-op, X-ray, Ultrasound and DIOR   Comments:  No complications noted. Images were saved.

## 2022-11-16 NOTE — PROVIDER NOTIFICATION
Brief hospitalist note    Patient admitted this AM by my partner, Dr. Degroot with NSTEMI. Went to cath lab for diagnostic and possible interventional angiogram. He was found to have severe 3 vessel disease. He did develop refractory chest pain during angiogram despite use of nitroglycerin infusion. Eventually IABP was started. He will proceed urgently to OR for CABG. Post-operatively [facility] will assume primary team and ICU will follow for medical management.     Hospitalist team will sign off, please re-consult our team as needed.      Prashant Romero MD

## 2022-11-16 NOTE — Clinical Note
Potential access sites were evaluated for patency using ultrasound.   The left radial artery was selected. Access was obtained under with Sonosite guidance using a micropuncture 21 gauge needle with direct visualization of needle entry.

## 2022-11-16 NOTE — ED PROVIDER NOTES
History   Chief Complaint:  Chest Pain       The history is provided by the patient.      Rodrigo Lanier is a 67 year old male with history of hyperlipidemia who presents with left arm pain, chest pressure, and shortness of breath. Patient provides that a few days ago, he was having an episode of left arm pain followed by shortness of breath and chest pressure over his heart. The patient then laid down and took deep breaths for 10-15 minutes before his symptoms went away. He has had the left arm pain before where he had a stress test done at [facility] with no concerning findings. Patient notes his episodes would start after smoking but with no other causes. Additionally notes that he had some back pain yesterday. He denies having any nausea, diaphoresis, or abdominal pain. Currently he is not having any symptoms. No recent travel.    Review of Systems   Constitutional: Negative for diaphoresis.   Respiratory: Positive for shortness of breath.    Cardiovascular: Positive for chest pain (pressure).   Gastrointestinal: Negative for abdominal pain and nausea.   Musculoskeletal: Positive for back pain.        (+) left arm pain   All other systems reviewed and are negative.      Allergies:  The patient has no known allergies.     Medications:  The patient denies any current medications.    Past Medical History:     Hyperlipidemia  Snoring  Nicotine dependence  Skin lesion     Past Surgical History:    Elbow surgery  Thumb surgery  Femur surgery  Strabismus surgery     Family History:    Heart disease  CAD  Diabetes  Cancer    Social History:  Patient came from home.  Patient is accompanied in the ED by his son.  Patient affirms tobacco use.  PCP: Virginia Winters     Physical Exam     Patient Vitals for the past 24 hrs:   BP Temp Temp src Pulse Resp SpO2 Height Weight   11/16/22 0145 (!) 175/108 -- -- 75 27 97 % -- --   11/16/22 0132 (!) 177/111 -- -- 81 22 -- -- --   11/16/22 0100 (!) 167/93 -- -- 82 17 97 % --  "--   11/16/22 0050 (!) 178/112 -- -- 75 26 96 % -- --   11/16/22 0020 (!) 143/88 97.5  F (36.4  C) Temporal 78 -- 96 % 1.727 m (5' 8\") 72.6 kg (160 lb)       Physical Exam  Constitutional: Alert, attentive, GCS 15   HENT:    Nose: Nose normal.    Mouth/Throat: Oropharynx is clear, mucous membranes are moist  Eyes: EOM are normal, anicteric, conjugate gaze  CV: regular rate and rhythm; no murmurs  Chest: Effort normal and breath sounds clear without wheezing or rales, symmetric bilaterally   GI:  non tender. No distension. No guarding or rebound.    MSK: No LE edema, no tenderness to palpation of BLE.  Neurological: Alert, attentive, moving all extremities equally.   Skin: Skin is warm and dry.      Emergency Department Course     ECG results from 11/16/22   EKG 12-lead, tracing only     Value    Systolic Blood Pressure     Diastolic Blood Pressure     Ventricular Rate 68    Atrial Rate 68    NH Interval 160    QRS Duration 88        QTc 418    P Axis 79    R AXIS 35    T Axis 189    Interpretation ECG      Sinus rhythm  Left ventricular hypertrophy with repolarization abnormality ( Sokolow-Dhillon )  Inferior infarct (cited on or before 16-NOV-2022)  Abnormal ECG  When compared with ECG of 16-NOV-2022 00:15, (unconfirmed)  No significant change was found         ECG  ECG obtained at 0015, ECG read at 0025  Normal sinus rhythm   Left ventricular hypertrophy with repolarization abnormality ( R in aVL, Sokolow-Dhillon, Romhit-Díaz )  Inferior infarct, age undetermined  Cannot rule out anterior infarct, age undetermined  Abnormal ECG  Concern for lateral ischemia  Rate 77 bpm. NH interval 164 ms. QRS duration 88 ms. QT/QTc 382/432 ms. P-R-T axes 73 28 187.    Imaging:  CT Aortic Survey w Contrast   Final Result   IMPRESSION:   1.  Normal aorta. Negative for aneurysm or dissection. No acute vascular findings.      2.  Severe coronary artery calcification.      3.  Question some relative left ventricular wall thickening. " Follow-up echocardiogram could be obtained if indicated.      4.  Subtle diffuse hazy ground-glass opacity in the lower lungs, right greater than left, nonspecific and could be seen with some mild edema versus less likely infiltrate.      5.  There is a more focal 1 cm ground-glass nodular opacity in the right lung anteriorly. Based on Fleischner criteria guidelines, follow-up CT in 6-12 months suggested for reevaluation.      6. 1.3 cm curvilinear stone present in the right renal pelvis with some mild prominence of the right renal pelvis. Cannot exclude some mild to low-grade obstruction as a result of this stone. Given this appearance and location of the stone, recommend    urologic consultation for further management options. Additional nonobstructing stones within the right kidney.      7. Prostatic enlargement.        Report per radiology    Laboratory:  Labs Ordered and Resulted from Time of ED Arrival to Time of ED Departure   BASIC METABOLIC PANEL - Abnormal       Result Value    Sodium 136      Potassium 3.7      Chloride 105      Carbon Dioxide (CO2) 20      Anion Gap 11      Urea Nitrogen 15      Creatinine 0.68      Calcium 8.8      Glucose 230 (*)     GFR Estimate >90     TROPONIN I - Abnormal    Troponin I High Sensitivity 809 (*)    CBC WITH PLATELETS AND DIFFERENTIAL - Abnormal    WBC Count 12.8 (*)     RBC Count 5.65      Hemoglobin 16.8      Hematocrit 48.0      MCV 85      MCH 29.7      MCHC 35.0      RDW 11.9      Platelet Count 234      % Neutrophils 77      % Lymphocytes 18      % Monocytes 5      % Eosinophils 0      % Basophils 0      % Immature Granulocytes 0      NRBCs per 100 WBC 0      Absolute Neutrophils 9.6 (*)     Absolute Lymphocytes 2.4      Absolute Monocytes 0.7      Absolute Eosinophils 0.0      Absolute Basophils 0.1      Absolute Immature Granulocytes 0.1      Absolute NRBCs 0.0     HEMOGLOBIN A1C - Abnormal    Hemoglobin A1C 6.6 (*)    NT PROBNP INPATIENT - Normal    N  terminal Pro BNP Inpatient 605     COVID-19 VIRUS (CORONAVIRUS) BY PCR - Normal    SARS CoV2 PCR Negative     ROUTINE UA WITH MICROSCOPIC   UA MACROSCOPIC WITH REFLEX TO MICRO AND CULTURE       Emergency Department Course:       Reviewed:  I reviewed nursing notes, vitals, past medical history and Care Everywhere    Assessments:  0047 I obtained history and examined the patient as noted above.   0219 I rechecked the patient and explained findings.     Consults:  0210 I consulted with Dr. Degroot of the hospitalist service and discussed patient admission. They accepted care of the patient.    Interventions:  Medications   nitroGLYcerin (NITROSTAT) sublingual tablet 0.4 mg (has no administration in time range)   heparin 25,000 units in 0.45% NaCl 250 mL ANTICOAGULANT infusion (850 Units/hr Intravenous New Bag 22)   nitroGLYcerin (NITRODUR) Patch in Place (has no administration in time range)   nitroGLYcerin (NITRODUR) 0.4 MG/HR 24 hr patch 1 patch (1 patch Transdermal Patch/Med Applied 22)   aspirin (ASA) chewable tablet 324 mg (324 mg Oral Given 22 0055)   iopamidol (ISOVUE-370) solution 80 mL (80 mLs Intravenous Given 22)   Saline Flush (80 mLs Intravenous Given 22)   heparin loading dose for LOW INTENSITY TREATMENT * Give BEFORE starting heparin infusion (4,350 Units Intravenous Given 22)       Disposition:  The patient was admitted to the hospital under the care of Dr. Degroot.     Impression & Plan     Medical Decision Makin-year-old male past medical history seen for tobacco use disorder, hyperlipidemia though he does not doctor frequently presenting for several days of stuttering central chest pain last occurring approximately an hour prior to arrival, he has somewhat atypical features and that it is worse more with smoking but he has been avoiding exertion.  EKG on arrival shows lateral ST segment depression concerning for lateral ischemia he has  however pain-free at the time of both of his EKGs.  His history is highly suggestive of ACS however given his age and risk factors (diagnosed here with severe hypertension as well as new diagnosis of diabetes in the ER, blood sugar was 230 with elevated A1c).  Given his report of some back pain, CTA was obtained, this shows no aortic dissection but is notable for severe coronary artery disease, proximal ureteral stone with question of hydro however kidney function is stable.  His initial troponin was elevated at 800 effectively ruling him in for an NSTEMI, he remained pain-free throughout his duration in the emergency department.  He was started on heparin drip, nitro patch for both blood pressure control and avoidance of anginal pain.  Will admit to medicine on cards telemetry for cards consult and pending angiogram.  No indication for Cath Lab activation he has remained pain-free.    Critical Care time was 30 minutes for this patient excluding procedures.      Diagnosis:    ICD-10-CM    1. NSTEMI (non-ST elevated myocardial infarction) (H)  I21.4       2. Other specified diabetes mellitus with other specified complication, without long-term current use of insulin (H)  E13.69       3. Benign essential hypertension  I10         Kaden Calabrese MD  [employer]  3:23 AM 11/16/22     Scribe Disclosure:  Humberto HEALY, am srving as a scribe at 12:47 AM on 11/16/2022 to document services personally performed by Kaden Calabrese MD based on my observations and the provider's statements to me.        Kaden Calabrese MD  11/16/22 0323

## 2022-11-16 NOTE — PRE-PROCEDURE
GENERAL PRE-PROCEDURE:   Procedure:  Coronary angiogram  Date/Time:  11/16/2022 1:12 PM    Verbal consent obtained?: Yes    Written consent obtained?: Yes    Risks and benefits: Risks, benefits and alternatives were discussed    Consent given by:  Patient  Patient states understanding of procedure being performed: Yes    Patient's understanding of procedure matches consent: Yes    Procedure consent matches procedure scheduled: Yes    Expected level of sedation:  Moderate  Appropriately NPO:  Yes  ASA Class:  2  Mallampati  :  Grade 2- soft palate, base of uvula, tonsillar pillars, and portion of posterior pharyngeal wall visible  Lungs:  Lungs clear with good breath sounds bilaterally  Heart:  Normal heart sounds and rate  History & Physical reviewed:  History and physical reviewed and no updates needed  Statement of review:  I have reviewed the lab findings, diagnostic data, medications, and the plan for sedation

## 2022-11-16 NOTE — ED TRIAGE NOTES
Patient here with chest pain with pain radiating to his left arm. He also c/o shortness of breath. He stated the symptoms started on saturday     Triage Assessment     Row Name 11/16/22 0012       Triage Assessment (Adult)    Airway WDL WDL       Respiratory WDL    Respiratory WDL WDL       Skin Circulation/Temperature WDL    Skin Circulation/Temperature WDL WDL       Cardiac WDL    Cardiac WDL WDL       Peripheral/Neurovascular WDL    Peripheral Neurovascular WDL WDL       Cognitive/Neuro/Behavioral WDL    Cognitive/Neuro/Behavioral WDL WDL

## 2022-11-16 NOTE — Clinical Note
Potential access sites were evaluated for patency using ultrasound.   The right femoral artery was selected. Access was obtained under with Sonosite and Fluoroscopic guidance using a micropuncture 21 gauge needle with direct visualization of needle entry.

## 2022-11-16 NOTE — OR NURSING
Pt arrived to OR from Cath Lab with TR band for hemostasis of Left radial artery. Band was placed and filled with 10cc at 1415. Air was removed from band in 2cc increments at 1525, 1600 and 1630. No additional drainage from site at those times.

## 2022-11-16 NOTE — CONSULTS
Cardiovascular and Thoracic Surgery Consultation      Rodrigo Lanier MRN# [id number]   YOB: 1955 Age: 67 year old   Date of Admission: 11/16/2022     Reason for consult: Severe multivessel CAD, CP with IV heparin/nitro with IABP           Assessment and Plan:     Coronary Angiogram:  Prox Cx to Mid Cx lesion is 100% stenosed.    Ost LAD to Prox LAD lesion is 70% stenosed.    1st Mrg lesion is 90% stenosed.    3rd Diag lesion is 60% stenosed.    2nd Diag lesion is 80% stenosed.    Prox LAD to Mid LAD lesion is 30% stenosed.    Dist LAD lesion is 50% stenosed.    1st Diag lesion is 90% stenosed.     Three vessel coronary artery disease (100% mLcx occlusion, 90% proximal OM1 stenosis, 70% pLAD, 90% proximal diagonal 1, 80% diagonal 2, 60% diagonal 3 stenosis, and severely diseased small RCA). Likely patient has left dominant system.    NSTEMI  Intraortic balloon pump insertion via RFA approach (sheath secured and sutured). Refractory angina while on IABP.   Hemostasis of LRA with TR band    Echo: Interpretation Summary     The left ventricle is normal in size.  There is mild concentric left ventricular hypertrophy.  The visual ejection fraction is 50-55%.  There is moderate inferolateral wall hypokinesis.  There is moderate lateral wall hypokinesis.  There is mild to moderate inferior wall hypokinesis.  No significant valvular heart disease.    STS: Risk of Mortality: 2.030% Renal Failure: 1.312% Permanent Stroke:  1.518% Prolonged Ventilation: 22.110% DSW Infection: 0.143% Reoperation:  3.966% Morbidity or Mortality: 25.964% Short Length of Stay: 40.349% Long Length of Stay:  6.520%    Called down to cath lab, surgeon present, on going CP despite IV heparin/nitro, to OR for urgent CABG             Chief Complaint:   Presented to [facility] ED 11/15 with chest pain, SOB and arm pain    History is obtained from the patient         History of Present Illness:   This patient is a 67 year old male with PMH HLD new  DM II diagnosis in ED who presented to [facility] ED on 11/15/22 with chest pain, L arm pain, chest pressure and SOB while playing trivia at a bar. He had an episode of L arm pain a few days ago with some SOB and chest pressure. He laid down, took a few big breaths for 10-15 mins and symptoms went away. He notes his symptoms would start after smoking. He was seen by cardiology and sent for coronary angiogram today. He was chest pain free until being in the cath lab (IV heparin stopped for procedure). An intra aortic balloon pump was placed and pt still rated chest pain 4-5/10. We are consulted for urgent CABG.               Past Medical History:          Birth History:   No birth history on file.            Past Surgical History:     Past Surgical History:   Procedure Laterality Date     ELBOW SURGERY  2002     FINGER SURGERY      thumb     LEG SURGERY      femur     STRABISMUS SURGERY                 Social History:     Social History     Tobacco Use     Smoking status: Every Day     Packs/day: 0.50     Years: 45.00     Pack years: 22.50     Types: Cigarettes     Smokeless tobacco: Never   Substance Use Topics     Alcohol use: Not on file     Comment: 2-4 beers per month.             Family History:     Family History   Problem Relation Age of Onset     CABG Mother 74     Diabetes Father      Coronary Artery Disease Father 55         at home from a presumed fatal myocardial infarction at age 55 years.     No Known Problems Sister      No Known Problems Sister      No Known Problems Sister      No Known Problems Brother      No Known Problems Brother      No Known Problems Brother      No Known Problems Brother      No Known Problems Brother      No Known Problems Brother      Cancer Brother      Arrhythmia No family hx of      Cardiomyopathy No family hx of              Immunizations:     Immunization History   Administered Date(s) Administered     Influenza Quad, Recombinant, pf(RIV4) (Flublok) 2018      Influenza Vaccine IM > 6 months Valent IIV4 (Alfuria,Fluzone) 10/16/2015, 10/27/2017     Influenza, Quad, High Dose, Pf, 65yr+ (Fluzone HD) 11/24/2020     Tdap (Adacel,Boostrix) 10/16/2015             Allergies:   No Known Allergies          Medications:     Current Facility-Administered Medications Ordered in Epic   Medication Dose Route Frequency Last Rate Last Admin     acetaminophen (TYLENOL) tablet 650 mg  650 mg Oral Q6H PRN        Or     acetaminophen (TYLENOL) Suppository 650 mg  650 mg Rectal Q6H PRN         [START ON 11/17/2022] aspirin EC tablet 81 mg  81 mg Oral Daily         Aspirin ONE TIME doses given prior to hospital arrival or in ER   Does not apply DOES NOT GO TO MAR         bisacodyl (DULCOLAX) suppository 10 mg  10 mg Rectal Daily PRN         carvedilol (COREG) tablet 6.25 mg  6.25 mg Oral BID   6.25 mg at 11/16/22 1048     glucose gel 15-30 g  15-30 g Oral Q15 Min PRN        Or     dextrose 50 % injection 25-50 mL  25-50 mL Intravenous Q15 Min PRN        Or     glucagon injection 1 mg  1 mg Subcutaneous Q15 Min PRN         heparin 25,000 units in 0.45% NaCl 250 mL ANTICOAGULANT infusion  0-5,000 Units/hr Intravenous Continuous   Stopped at 11/16/22 1315     HOLD: nitroGLYcerin IF   Does not apply HOLD         insulin aspart (NovoLOG) injection (RAPID ACTING)  1-6 Units Subcutaneous Q4H         lidocaine (LMX4) cream   Topical Q1H PRN         lidocaine 1 % 0.1-1 mL  0.1-1 mL Other Q1H PRN         [START ON 11/17/2022] lisinopril (ZESTRIL) tablet 20 mg  20 mg Oral Daily         medication instruction   Does not apply Continuous PRN         melatonin tablet 1 mg  1 mg Oral At Bedtime PRN         naloxone (NARCAN) injection 0.2 mg  0.2 mg Intravenous Q2 Min PRN        Or     naloxone (NARCAN) injection 0.4 mg  0.4 mg Intravenous Q2 Min PRN        Or     naloxone (NARCAN) injection 0.2 mg  0.2 mg Intramuscular Q2 Min PRN        Or     naloxone (NARCAN) injection 0.4 mg  0.4 mg Intramuscular Q2 Min  PRN         nicotine (NICODERM CQ) 14 MG/24HR 24 hr patch 1 patch  1 patch Transdermal Daily         nicotine (NICORETTE) gum 2 mg  2 mg Buccal Q1H PRN         nicotine Patch in Place   Transdermal Q8H         nitroGLYcerin (NITRODUR) 0.4 MG/HR 24 hr patch 1 patch  1 patch Transdermal Daily   1 patch at 11/16/22 0255     nitroGLYcerin (NITRODUR) Patch in Place   Transdermal Q8H JJ         nitroGLYcerin (NITROSTAT) sublingual tablet 0.4 mg  0.4 mg Sublingual Q5 Min PRN         nitroGLYcerin 50 mg in D5W 250 mL PERIPHERAL IV infusion   mcg/min Intravenous Continuous 9 mL/hr at 11/16/22 1450 30 mcg/min at 11/16/22 1450     ondansetron (ZOFRAN ODT) ODT tab 4 mg  4 mg Oral Q6H PRN        Or     ondansetron (ZOFRAN) injection 4 mg  4 mg Intravenous Q6H PRN         oxyCODONE IR (ROXICODONE) half-tab 2.5 mg  2.5 mg Oral Q4H PRN         Patient is already receiving anticoagulation with heparin, enoxaparin (LOVENOX), warfarin (COUMADIN)  or other anticoagulant medication   Does not apply Continuous PRN         polyethylene glycol (MIRALAX) Packet 17 g  17 g Oral Daily PRN         prochlorperazine (COMPAZINE) injection 5 mg  5 mg Intravenous Q6H PRN        Or     prochlorperazine (COMPAZINE) tablet 5 mg  5 mg Oral Q6H PRN        Or     prochlorperazine (COMPAZINE) suppository 12.5 mg  12.5 mg Rectal Q12H PRN         rosuvastatin (CRESTOR) tablet 40 mg  40 mg Oral Daily         senna-docusate (SENOKOT-S/PERICOLACE) 8.6-50 MG per tablet 1 tablet  1 tablet Oral BID PRN        Or     senna-docusate (SENOKOT-S/PERICOLACE) 8.6-50 MG per tablet 2 tablet  2 tablet Oral BID PRN         sodium chloride (PF) 0.9% PF flush 3 mL  3 mL Intracatheter Q8H         sodium chloride (PF) 0.9% PF flush 3 mL  3 mL Intracatheter q1 min prn         Current Outpatient Medications Ordered in Epic   Medication     [START ON 11/17/2022] aspirin (ASA) 81 MG EC tablet     rosuvastatin (CRESTOR) 40 MG tablet             Review of Systems:   The 5  point Review of Systems is negative other than noted in the HPI            Physical Exam:   Vitals were reviewed  Temp: 97.9  F (36.6  C) Temp src: Oral BP: (!) 140/80 Pulse: 81   Resp: 16 SpO2: 95 % O2 Device: Oxymask Oxygen Delivery: 10 LPM  Constitutional:   awake, alert, cooperative, no apparent distress, and appears stated age     Eyes:   Lids and lashes normal, pupils equal, round and reactive to light     Rest of Physical Exam deferred as pt on cath lab table        Data:     Lab Results   Component Value Date    WBC 12.8 (H) 11/16/2022    HGB 16.8 11/16/2022    HCT 48.0 11/16/2022     11/16/2022     11/16/2022    POTASSIUM 3.7 11/16/2022    CHLORIDE 105 11/16/2022    CO2 20 11/16/2022    BUN 15 11/16/2022    CR 0.68 11/16/2022     (H) 11/16/2022    NTBNPI 605 11/16/2022    AST 32 11/16/2022    ALT 30 11/16/2022    ALKPHOS 77 11/16/2022    BILITOTAL 0.8 11/16/2022

## 2022-11-16 NOTE — ANESTHESIA PROCEDURE NOTES
Arterial Line Procedure Note    Pre-Procedure   Staff -        Anesthesiologist:  Ramsey Brown MD       Performed By: anesthesiologist       Location: pre-op       Pre-Anesthestic Checklist: patient identified, IV checked, risks and benefits discussed, informed consent, monitors and equipment checked and pre-op evaluation  Timeout:       Correct Patient: Yes        Correct Procedure: Yes        Correct Site: Yes        Correct Position: Yes   Line Placement:   This line was placed Post Induction  Procedure   Procedure: arterial line       Laterality: right       Insertion Site: radial.  Sterile Prep        Standard elements of sterile barrier followed       Skin prep: Chloraprep  Insertion/Injection        Technique: ultrasound guided        1. Ultrasound was used to evaluate the access site.       2. Artery evaluated via ultrasound for patency/adequacy.       3. Using real-time ultrasound the needle/catheter was observed entering the artery/vein.       5. The visualized structures were anatomically normal.       6. There were no apparent abnormal pathologic findings.       Catheter Type/Size: 20 G, 1.75 in/4.5 cm quick cath (integral wire)  Narrative         Secured by: other       Tegaderm dressing used.       Complications: None apparent,        Arterial waveform: Yes    Comments:  Arterial line secured with a Tegaderm and tape. Sterile gloves, mask, hat, and sterile drape utilized. No IMAGES SAVED

## 2022-11-16 NOTE — PHARMACY-ADMISSION MEDICATION HISTORY
Pharmacy Medication History  Admission medication history interview status for the 11/16/2022  admission is complete. See EPIC admission navigator for prior to admission medications     Location of Interview: Phone  Medication history sources: Patient, Surescripts and Care Everywhere    Significant changes made to the medication list:  none      Additional medication history information:   Patient denies any current prescription or OTC meds.    Medication reconciliation completed by provider prior to medication history? No    Time spent in this activity: 5 mins    Prior to Admission medications    Not on File       The information provided in this note is only as accurate as the sources available at the time of update(s)     Paige Porras, AshishD

## 2022-11-16 NOTE — H&P
[facility]    History and Physical - Hospitalist Service       Date of Admission:  11/16/2022    Assessment & Plan      Rodrigo Lanier is a 67 year old male admitted on 11/16/2022. He presents to the emergency department at the urging of his friends and family after he had multiple episodes of central chest tightness associated with shortness of breath and left arm pain tonight while participating in trivia at a bar.  Found to have an elevated troponin as well as Q waves inferiorly suggesting significant coronary artery disease and prior myocardial infarction.  Minimal prior medical care/interactions.    Non-ST elevation myocardial infarction: Patient appears to have significant coronary artery disease on CTA.  EKG with inferior Q waves suggestive of myocardial infarction since prior EKG in 2003.  Several months of occasional left arm discomfort, and multiple episodes of chest tightness associated with shortness of breath and left arm discomfort tonight.  Minimal prior medical care.  Family history of early coronary artery disease.  Personal history of now newly diagnosed diabetes, hypertension, hyperlipidemia  -Enteric-coated aspirin 81 mg daily  -Continue heparin infusion  -Initiation of atorvastatin 40 mg daily; lipid panel pending as well as hepatic panel  -Cardiology consulted  -N.p.o. for anticipated coronary angiogram in a.m.  -TTE   -cardiology consulted  -As needed sublingual nitroglycerin; patient also with a nitroglycerin patch in place.  -Initiating carvedilol 6.25 twice daily and lisinopril 10 mg daily  -Discussed findings of severe coronary artery disease on imaging with patient as well as concern for prior inferior myocardial infarction with Q waves present    Type 2 diabetes: New diagnosis.  Hemoglobin A1c of 6.6 with glucose of 230  -Medium dose sliding scale insulin; currently ordered as q. 4 hours with n.p.o. status  -initiating aspirin and statin therapies as  above  -Initiation and up titration of metformin following cardiac work-up versus at discharge.  Held given plan for intra-arterial contrast administration    Tobacco use disorder with nicotine dependence:  -Complete tobacco cessation discussed and encouraged  -Tobacco cessation consultation  -Nicotine patch panel in place with as needed nicotine gum available as well    Hypertension:  -Carvedilol 6.25 twice daily initiated; anticipate uptitration as blood pressures allow  -Lisinopril 10 mg daily initiated  -Currently with nitroglycerin patch in place    Bilateral nephrolithiasis with possible right renal pelvis obstructive stone: No CVA tenderness to percussion  -Urinalysis with micro and reflex culture  -[place] [facility] consulted; may require intervention on right renal pelvis/proximal ureteral stone.  This could potentially be complicated by initiation of dual antiplatelet therapy pending hospital course with coronary artery disease evaluation.  -Renal ultrasound ordered; bilateral ureteral jet assessment requested as this might help determine if more acute intervention is needed on right proximal ureteral stone       Diet:  N.p.o. per procedural guidelines  DVT Prophylaxis: Low-dose heparin infusion  Jasso Catheter: Not present  Central Lines: None  Cardiac Monitoring: None  Code Status:  Full code.  Confirmed with patient on admission    Clinically Significant Risk Factors Present on Admission                     # DMII: A1C = 6.6 % (Ref range: 0.0 - 5.6 %) within past 3 months            Disposition Plan      Expected Discharge Date: 11/18/2022                The patient's care was discussed with the Patient and Patient's son at bedside, Dr. Calabrese in the emergency department..    Noam Degroot MD  Hospitalist Service  [facility]  Securely message with the Vocera Web Console (learn more here)  Text page via [employer] Paging/Directory          ______________________________________________________________________    Chief Complaint   Chest tightness, shortness of breath, left arm pain    History is obtained from patient, chart review, discussion with Dr. Calabrese in the emergency department, patient's son at bedside, review of outside records including 2015 stress echocardiogram which was negative for inducible ischemia other than some EKG changes isolated in V2 performed through [employer] system.    History of Present Illness   Rodrigo Lanier is a 67 year old male who presents to the emergency department at the [facility] for evaluation of chest tightness, shortness of breath, left arm pain at rest while participating in public [employer].    Patient has limited prior medical care.  He does not doctor regularly.  He takes no medications.  He smokes 1/2 pack/day, drinks alcohol occasionally.      Works part-time with [employer], reports he has never had similar chest discomfort in the past with activity.  He does recall intermittently having episodes of left arm pain several months ago, though attributed this to a distant history of elbow surgery secondary to a fracture.  He does note that his left arm pain involves his entire arm, not just his elbow    Over the past 2 days or so, patient has noted intermittent central chest tightness associated with shortness of breath and again left arm pain.  He tells me that he has found that if he lays down and takes deep breath, relaxes, this discomfort will subside after approximately 15 minutes.    Patient was at [employer] night with his son and friends tonight when he had 2 episodes of central chest tightness.  His friends noted that he looked unwell, encouraged him to seek medical care.  Patient went home, and as he was having these episodes, recognize that he should likely be evaluated in the emergency department, and subsequently presented.    In the ER, elevated troponin consistent with non-ST elevation  myocardial infarction.  Patient does have some lateral ST depressions and some ST elevation in aVR concerning for proximal disease.  A CTA was performed in the emergency department and also concerning for severe multivessel and proximal disease involving his coronary arteries.  He was found to have a new diagnosis of type 2 diabetes as well as uncontrolled hypertension with blood pressure in the 170-180 systolic range.    Discussed at length findings with patient including plan for coronary angiogram, echocardiogram, possible stent versus bypass grafting.  Discussed also his new diagnosis of diabetes, hypertension and typical medications used to treat coronary artery disease including statin, aspirin, potential dual antiplatelet therapy, and cardioprotective antihypertensives including beta-blockade and lisinopril.  Discussed likely initiation of metformin as an outpatient, held given intra-arterial contrast anticipated.  This will be a significant change for patient, who has not been taking medications prior to tonight.  He recognizes the importance of his medications.  Discussed also importance of smoking cessation.  He feels he is ready to quit.    Patient has remained chest pain-free in the emergency department, his last episode of chest discomfort occurred prior to admission/arrival    Review of Systems    The 10 point Review of Systems is negative other than noted in the HPI or here.  No fevers or chills  No nausea or vomiting      Past Medical History    I have reviewed this patient's medical history and updated it with pertinent information if needed.   Hypertension    Past Surgical History   I have reviewed this patient's surgical history and updated it with pertinent information if needed.  Past Surgical History:   Procedure Laterality Date     ELBOW SURGERY  2002     FINGER SURGERY      thumb     LEG SURGERY      femur     STRABISMUS SURGERY         Social History   I have reviewed this patient's social  "history and updated it with pertinent information if needed.  Social History     Tobacco Use     Smoking status: Every Day     Packs/day: 0.50     Types: Cigarettes   Occasional alcohol use.    Family History   I have reviewed this patient's family history and updated it with pertinent information if needed.  Family History   Problem Relation Age of Onset     Heart Disease Mother      Coronary Artery Disease Mother  had coronary artery bypass in her early 70s     Heart Disease Father   of myocardial infarction at age 59     Diabetes Father      Coronary Artery Disease Father      No Known Problems Sister      No Known Problems Brother      No Known Problems Sister      No Known Problems Sister      No Known Problems Brother      No Known Problems Brother      No Known Problems Brother      No Known Problems Brother      No Known Problems Brother      Cancer Brother    Son with alcohol dependence and history of alcohol withdrawal    Prior to Admission Medications   None     Allergies   No Known Allergies    Physical Exam   Vital Signs: Temp: 97.5  F (36.4  C) Temp src: Temporal BP: (!) 175/108 Pulse: 75   Resp: 27 SpO2: 97 % O2 Device: None (Room air)    Weight: 160 lbs 0 oz    General Appearance: Generally well-appearing and robust 67-year-old male resting comfortably on [facility].  Eyes: No scleral icterus or injection  HEENT: Normocephalic and atraumatic.  Respiratory: Breath sounds are clear bilaterally to auscultation with exception of some coarse crackles in right base.  Cardiovascular: Regular rate and rhythm, occasional PVC with crisp S2 sound  GI: Abdomen soft, nontender to palpation.  Patient has some guarding of his abdomen with palpation, though tells me that this is reflex from \"growing up with brothers.\"  No palpable mass  Lymph/Hematologic: No lower extremity edema  Genitourinary: Not examined  Skin: No jaundice, no petechiae.  Has some erythema over his left elbow from prior surgical " treatments  Musculoskeletal: Muscular tone and bulk intact in all extremities and appropriate for age.  Neurologic: Alert, conversant, appropriate conversation.  Mental status is grossly intact.  Psychiatric: Very pleasant, normal affect    Data   Data reviewed today: I reviewed all medications, new labs and imaging results over the last 24 hours. I personally reviewed the EKG tracing showing LVH electrical pattern, initially with ST depressions laterally and some aVR ST elevation isolated.  Q waves in inferior leads..    Recent Labs   Lab 11/16/22  0015   WBC 12.8*   HGB 16.8   MCV 85         POTASSIUM 3.7   CHLORIDE 105   CO2 20   BUN 15   CR 0.68   ANIONGAP 11   LINDA 8.8   *

## 2022-11-16 NOTE — OR NURSING
Surgeon ordered to remove TR band once he had done using Ruletract retractor, the writer removed the TR band once ensure no air to be removed. No bleeding noted. Applied new dressing, 4x4 with coban over left wrist.    Wants to update you on her O2 stats  &   Also patient is taking  Boost food drink  .

## 2022-11-17 ENCOUNTER — APPOINTMENT (OUTPATIENT)
Dept: GENERAL RADIOLOGY | Facility: CLINIC | Age: 67
DRG: 234 | End: 2022-11-17
Attending: PHYSICIAN ASSISTANT
Payer: MEDICARE

## 2022-11-17 ENCOUNTER — APPOINTMENT (OUTPATIENT)
Dept: OCCUPATIONAL THERAPY | Facility: CLINIC | Age: 67
DRG: 234 | End: 2022-11-17
Attending: INTERNAL MEDICINE
Payer: MEDICARE

## 2022-11-17 LAB
ALBUMIN SERPL-MCNC: 2.5 G/DL (ref 3.4–5)
ALP SERPL-CCNC: 49 U/L (ref 40–150)
ALT SERPL W P-5'-P-CCNC: 49 U/L (ref 0–70)
ANION GAP SERPL CALCULATED.3IONS-SCNC: 3 MMOL/L (ref 3–14)
AST SERPL W P-5'-P-CCNC: 374 U/L (ref 0–45)
ATRIAL RATE - MUSE: 77 BPM
BACTERIA UR CULT: NO GROWTH
BASE EXCESS BLDA CALC-SCNC: -0.9 MMOL/L (ref -9.6–2)
BASE EXCESS BLDA CALC-SCNC: -1.7 MMOL/L (ref -9–1.8)
BASE EXCESS BLDA CALC-SCNC: -1.8 MMOL/L (ref -9.6–2)
BASE EXCESS BLDA CALC-SCNC: -1.8 MMOL/L (ref -9–1.8)
BASE EXCESS BLDA CALC-SCNC: -2.6 MMOL/L (ref -9.6–2)
BASE EXCESS BLDA CALC-SCNC: -2.8 MMOL/L (ref -9.6–2)
BASE EXCESS BLDA CALC-SCNC: -3 MMOL/L (ref -9.6–2)
BASE EXCESS BLDA CALC-SCNC: -3.2 MMOL/L (ref -9.6–2)
BASE EXCESS BLDA CALC-SCNC: 0.7 MMOL/L (ref -9.6–2)
BASE EXCESS BLDV CALC-SCNC: -3.1 MMOL/L (ref -8.1–1.9)
BILIRUB SERPL-MCNC: 0.4 MG/DL (ref 0.2–1.3)
BUN SERPL-MCNC: 15 MG/DL (ref 7–30)
CA-I BLD-MCNC: 3.9 MG/DL (ref 4.4–5.2)
CA-I BLD-MCNC: 3.9 MG/DL (ref 4.4–5.2)
CA-I BLD-MCNC: 4 MG/DL (ref 4.4–5.2)
CA-I BLD-MCNC: 4 MG/DL (ref 4.4–5.2)
CA-I BLD-MCNC: 4.1 MG/DL (ref 4.4–5.2)
CA-I BLD-MCNC: 4.1 MG/DL (ref 4.4–5.2)
CA-I BLD-MCNC: 4.5 MG/DL (ref 4.4–5.2)
CA-I BLD-MCNC: 4.8 MG/DL (ref 4.4–5.2)
CA-I BLD-MCNC: 5 MG/DL (ref 4.4–5.2)
CALCIUM SERPL-MCNC: 7.7 MG/DL (ref 8.5–10.1)
CHLORIDE BLD-SCNC: 115 MMOL/L (ref 94–109)
CO2 SERPL-SCNC: 24 MMOL/L (ref 20–32)
CREAT SERPL-MCNC: 0.8 MG/DL (ref 0.66–1.25)
DIASTOLIC BLOOD PRESSURE - MUSE: NORMAL MMHG
ERYTHROCYTE [DISTWIDTH] IN BLOOD BY AUTOMATED COUNT: 12.6 % (ref 10–15)
GFR SERPL CREATININE-BSD FRML MDRD: >90 ML/MIN/1.73M2
GLUCOSE BLD-MCNC: 146 MG/DL (ref 70–99)
GLUCOSE BLD-MCNC: 152 MG/DL (ref 70–99)
GLUCOSE BLD-MCNC: 164 MG/DL (ref 70–99)
GLUCOSE BLD-MCNC: 169 MG/DL (ref 70–99)
GLUCOSE BLD-MCNC: 171 MG/DL (ref 70–99)
GLUCOSE BLD-MCNC: 175 MG/DL (ref 70–99)
GLUCOSE BLD-MCNC: 176 MG/DL (ref 70–99)
GLUCOSE BLD-MCNC: 177 MG/DL (ref 70–99)
GLUCOSE BLD-MCNC: 177 MG/DL (ref 70–99)
GLUCOSE BLDC GLUCOMTR-MCNC: 128 MG/DL (ref 70–99)
GLUCOSE BLDC GLUCOMTR-MCNC: 137 MG/DL (ref 70–99)
GLUCOSE BLDC GLUCOMTR-MCNC: 140 MG/DL (ref 70–99)
GLUCOSE BLDC GLUCOMTR-MCNC: 149 MG/DL (ref 70–99)
GLUCOSE BLDC GLUCOMTR-MCNC: 151 MG/DL (ref 70–99)
GLUCOSE BLDC GLUCOMTR-MCNC: 153 MG/DL (ref 70–99)
GLUCOSE BLDC GLUCOMTR-MCNC: 165 MG/DL (ref 70–99)
GLUCOSE BLDC GLUCOMTR-MCNC: 189 MG/DL (ref 70–99)
HCO3 BLD-SCNC: 22 MMOL/L (ref 21–28)
HCO3 BLD-SCNC: 23 MMOL/L (ref 21–28)
HCO3 BLDA-SCNC: 22 MMOL/L (ref 21–28)
HCO3 BLDA-SCNC: 22 MMOL/L (ref 21–28)
HCO3 BLDA-SCNC: 23 MMOL/L (ref 21–28)
HCO3 BLDA-SCNC: 24 MMOL/L (ref 21–28)
HCO3 BLDA-SCNC: 25 MMOL/L (ref 21–28)
HCO3 BLDA-SCNC: 25 MMOL/L (ref 21–28)
HCO3 BLDA-SCNC: 26 MMOL/L (ref 21–28)
HCO3 BLDV-SCNC: 26 MMOL/L (ref 21–28)
HCT VFR BLD AUTO: 34.8 % (ref 40–53)
HGB BLD-MCNC: 10.4 G/DL (ref 13.3–17.7)
HGB BLD-MCNC: 11 G/DL (ref 13.3–17.7)
HGB BLD-MCNC: 11.1 G/DL (ref 13.3–17.7)
HGB BLD-MCNC: 11.3 G/DL (ref 13.3–17.7)
HGB BLD-MCNC: 11.3 G/DL (ref 13.3–17.7)
HGB BLD-MCNC: 11.8 G/DL (ref 13.3–17.7)
HGB BLD-MCNC: 11.9 G/DL (ref 13.3–17.7)
HGB BLD-MCNC: 12 G/DL (ref 13.3–17.7)
HGB BLD-MCNC: 14.7 G/DL (ref 13.3–17.7)
INTERPRETATION ECG - MUSE: NORMAL
LACTATE BLD-SCNC: 0.9 MMOL/L
LACTATE BLD-SCNC: 1.3 MMOL/L
LACTATE BLD-SCNC: 1.4 MMOL/L
LACTATE BLD-SCNC: 1.4 MMOL/L
LACTATE BLD-SCNC: 1.7 MMOL/L
LACTATE BLD-SCNC: 2.7 MMOL/L
LACTATE SERPL-SCNC: 1.3 MMOL/L (ref 0.7–2)
LACTATE SERPL-SCNC: 1.4 MMOL/L (ref 0.7–2)
MAGNESIUM SERPL-MCNC: 2.3 MG/DL (ref 1.6–2.3)
MCH RBC QN AUTO: 29.9 PG (ref 26.5–33)
MCHC RBC AUTO-ENTMCNC: 34.5 G/DL (ref 31.5–36.5)
MCV RBC AUTO: 87 FL (ref 78–100)
O2/TOTAL GAS SETTING VFR VENT: 100 %
O2/TOTAL GAS SETTING VFR VENT: 40 %
O2/TOTAL GAS SETTING VFR VENT: 50 %
O2/TOTAL GAS SETTING VFR VENT: 60 %
O2/TOTAL GAS SETTING VFR VENT: 70 %
O2/TOTAL GAS SETTING VFR VENT: 75 %
O2/TOTAL GAS SETTING VFR VENT: 75 %
P AXIS - MUSE: 73 DEGREES
PCO2 BLD: 34 MM HG (ref 35–45)
PCO2 BLD: 36 MM HG (ref 35–45)
PCO2 BLDA: 36 MM HG (ref 35–45)
PCO2 BLDA: 39 MM HG (ref 35–45)
PCO2 BLDA: 40 MM HG (ref 35–45)
PCO2 BLDA: 41 MM HG (ref 35–45)
PCO2 BLDA: 46 MM HG (ref 35–45)
PCO2 BLDA: 48 MM HG (ref 35–45)
PCO2 BLDA: 54 MM HG (ref 35–45)
PCO2 BLDV: 63 MM HG (ref 40–50)
PH BLD: 7.41 [PH] (ref 7.35–7.45)
PH BLD: 7.42 [PH] (ref 7.35–7.45)
PH BLDA: 7.27 [PH] (ref 7.35–7.45)
PH BLDA: 7.31 [PH] (ref 7.35–7.45)
PH BLDA: 7.33 [PH] (ref 7.35–7.45)
PH BLDA: 7.37 [PH] (ref 7.35–7.45)
PH BLDA: 7.37 [PH] (ref 7.35–7.45)
PH BLDA: 7.39 [PH] (ref 7.35–7.45)
PH BLDA: 7.41 [PH] (ref 7.35–7.45)
PH BLDV: 7.22 [PH] (ref 7.32–7.43)
PHOSPHATE SERPL-MCNC: 2.6 MG/DL (ref 2.5–4.5)
PLATELET # BLD AUTO: 163 10E3/UL (ref 150–450)
PO2 BLD: 117 MM HG (ref 80–105)
PO2 BLD: 152 MM HG (ref 80–105)
PO2 BLDA: 146 MM HG (ref 80–105)
PO2 BLDA: 372 MM HG (ref 80–105)
PO2 BLDA: 382 MM HG (ref 80–105)
PO2 BLDA: 382 MM HG (ref 80–105)
PO2 BLDA: 386 MM HG (ref 80–105)
PO2 BLDA: 404 MM HG (ref 80–105)
PO2 BLDA: 425 MM HG (ref 80–105)
PO2 BLDV: 59 MM HG (ref 25–47)
POTASSIUM BLD-SCNC: 3.9 MMOL/L (ref 3.4–5.3)
POTASSIUM BLD-SCNC: 3.9 MMOL/L (ref 3.5–5)
POTASSIUM BLD-SCNC: 4.2 MMOL/L (ref 3.5–5)
POTASSIUM BLD-SCNC: 4.5 MMOL/L (ref 3.5–5)
POTASSIUM BLD-SCNC: 4.6 MMOL/L (ref 3.5–5)
POTASSIUM BLD-SCNC: 4.8 MMOL/L (ref 3.5–5)
POTASSIUM BLD-SCNC: 5.1 MMOL/L (ref 3.5–5)
POTASSIUM BLD-SCNC: 5.2 MMOL/L (ref 3.5–5)
POTASSIUM BLD-SCNC: 5.3 MMOL/L (ref 3.5–5)
PR INTERVAL - MUSE: 164 MS
PROT SERPL-MCNC: 4.9 G/DL (ref 6.8–8.8)
QRS DURATION - MUSE: 88 MS
QT - MUSE: 382 MS
QTC - MUSE: 432 MS
R AXIS - MUSE: 28 DEGREES
RBC # BLD AUTO: 4.01 10E6/UL (ref 4.4–5.9)
SODIUM BLD-SCNC: 139 MMOL/L (ref 133–144)
SODIUM BLD-SCNC: 140 MMOL/L (ref 133–144)
SODIUM BLD-SCNC: 142 MMOL/L (ref 133–144)
SODIUM SERPL-SCNC: 142 MMOL/L (ref 133–144)
SYSTOLIC BLOOD PRESSURE - MUSE: NORMAL MMHG
T AXIS - MUSE: 187 DEGREES
VENTRICULAR RATE- MUSE: 77 BPM
WBC # BLD AUTO: 14.3 10E3/UL (ref 4–11)

## 2022-11-17 PROCEDURE — 250N000013 HC RX MED GY IP 250 OP 250 PS 637: Performed by: INTERNAL MEDICINE

## 2022-11-17 PROCEDURE — 999N000065 XR CHEST PORT 1 VIEW

## 2022-11-17 PROCEDURE — 999N000157 HC STATISTIC RCP TIME EA 10 MIN

## 2022-11-17 PROCEDURE — 83605 ASSAY OF LACTIC ACID: CPT | Performed by: STUDENT IN AN ORGANIZED HEALTH CARE EDUCATION/TRAINING PROGRAM

## 2022-11-17 PROCEDURE — 999N000009 HC STATISTIC AIRWAY CARE

## 2022-11-17 PROCEDURE — 250N000011 HC RX IP 250 OP 636: Performed by: INTERNAL MEDICINE

## 2022-11-17 PROCEDURE — 258N000003 HC RX IP 258 OP 636: Performed by: PHYSICIAN ASSISTANT

## 2022-11-17 PROCEDURE — 82803 BLOOD GASES ANY COMBINATION: CPT | Performed by: STUDENT IN AN ORGANIZED HEALTH CARE EDUCATION/TRAINING PROGRAM

## 2022-11-17 PROCEDURE — 99291 CRITICAL CARE FIRST HOUR: CPT | Mod: 24 | Performed by: INTERNAL MEDICINE

## 2022-11-17 PROCEDURE — 250N000009 HC RX 250: Performed by: PHYSICIAN ASSISTANT

## 2022-11-17 PROCEDURE — 82330 ASSAY OF CALCIUM: CPT | Performed by: PHYSICIAN ASSISTANT

## 2022-11-17 PROCEDURE — 97535 SELF CARE MNGMENT TRAINING: CPT | Mod: GO

## 2022-11-17 PROCEDURE — 93005 ELECTROCARDIOGRAM TRACING: CPT

## 2022-11-17 PROCEDURE — 258N000003 HC RX IP 258 OP 636: Performed by: STUDENT IN AN ORGANIZED HEALTH CARE EDUCATION/TRAINING PROGRAM

## 2022-11-17 PROCEDURE — 250N000011 HC RX IP 250 OP 636: Performed by: PHYSICIAN ASSISTANT

## 2022-11-17 PROCEDURE — 250N000013 HC RX MED GY IP 250 OP 250 PS 637: Performed by: PHYSICIAN ASSISTANT

## 2022-11-17 PROCEDURE — 93010 ELECTROCARDIOGRAM REPORT: CPT | Performed by: INTERNAL MEDICINE

## 2022-11-17 PROCEDURE — 83735 ASSAY OF MAGNESIUM: CPT | Performed by: PHYSICIAN ASSISTANT

## 2022-11-17 PROCEDURE — 85027 COMPLETE CBC AUTOMATED: CPT | Performed by: PHYSICIAN ASSISTANT

## 2022-11-17 PROCEDURE — 250N000012 HC RX MED GY IP 250 OP 636 PS 637: Performed by: PHYSICIAN ASSISTANT

## 2022-11-17 PROCEDURE — 80053 COMPREHEN METABOLIC PANEL: CPT | Performed by: STUDENT IN AN ORGANIZED HEALTH CARE EDUCATION/TRAINING PROGRAM

## 2022-11-17 PROCEDURE — 97530 THERAPEUTIC ACTIVITIES: CPT | Mod: GO

## 2022-11-17 PROCEDURE — 94003 VENT MGMT INPAT SUBQ DAY: CPT

## 2022-11-17 PROCEDURE — 87641 MR-STAPH DNA AMP PROBE: CPT | Performed by: STUDENT IN AN ORGANIZED HEALTH CARE EDUCATION/TRAINING PROGRAM

## 2022-11-17 PROCEDURE — 97166 OT EVAL MOD COMPLEX 45 MIN: CPT | Mod: GO

## 2022-11-17 PROCEDURE — 200N000001 HC R&B ICU

## 2022-11-17 PROCEDURE — 99291 CRITICAL CARE FIRST HOUR: CPT | Performed by: INTERNAL MEDICINE

## 2022-11-17 PROCEDURE — 84100 ASSAY OF PHOSPHORUS: CPT | Performed by: PHYSICIAN ASSISTANT

## 2022-11-17 RX ORDER — NICOTINE POLACRILEX 4 MG
15-30 LOZENGE BUCCAL
Status: DISCONTINUED | OUTPATIENT
Start: 2022-11-17 | End: 2022-11-20 | Stop reason: HOSPADM

## 2022-11-17 RX ORDER — DEXTROSE MONOHYDRATE 25 G/50ML
25-50 INJECTION, SOLUTION INTRAVENOUS
Status: DISCONTINUED | OUTPATIENT
Start: 2022-11-17 | End: 2022-11-20 | Stop reason: HOSPADM

## 2022-11-17 RX ORDER — ROSUVASTATIN CALCIUM 20 MG/1
40 TABLET, COATED ORAL DAILY
Status: DISCONTINUED | OUTPATIENT
Start: 2022-11-17 | End: 2022-11-20 | Stop reason: HOSPADM

## 2022-11-17 RX ORDER — SODIUM CHLORIDE, SODIUM LACTATE, POTASSIUM CHLORIDE, CALCIUM CHLORIDE 600; 310; 30; 20 MG/100ML; MG/100ML; MG/100ML; MG/100ML
INJECTION, SOLUTION INTRAVENOUS CONTINUOUS
Status: DISCONTINUED | OUTPATIENT
Start: 2022-11-17 | End: 2022-11-20 | Stop reason: HOSPADM

## 2022-11-17 RX ADMIN — CHLORHEXIDINE GLUCONATE 15 ML: 1.2 SOLUTION ORAL at 07:38

## 2022-11-17 RX ADMIN — SENNOSIDES AND DOCUSATE SODIUM 1 TABLET: 50; 8.6 TABLET ORAL at 07:52

## 2022-11-17 RX ADMIN — ACETAMINOPHEN 975 MG: 325 TABLET, FILM COATED ORAL at 07:38

## 2022-11-17 RX ADMIN — METHOCARBAMOL 500 MG: 500 TABLET ORAL at 04:16

## 2022-11-17 RX ADMIN — PROPOFOL 30 MCG/KG/MIN: 10 INJECTION, EMULSION INTRAVENOUS at 00:37

## 2022-11-17 RX ADMIN — CEFAZOLIN 1 G: 1 INJECTION, POWDER, FOR SOLUTION INTRAMUSCULAR; INTRAVENOUS at 19:56

## 2022-11-17 RX ADMIN — ACETAMINOPHEN 975 MG: 325 TABLET, FILM COATED ORAL at 23:50

## 2022-11-17 RX ADMIN — GABAPENTIN 100 MG: 100 CAPSULE ORAL at 00:10

## 2022-11-17 RX ADMIN — CEFAZOLIN 1 G: 1 INJECTION, POWDER, FOR SOLUTION INTRAMUSCULAR; INTRAVENOUS at 03:15

## 2022-11-17 RX ADMIN — HYDROMORPHONE HYDROCHLORIDE 0.2 MG: 0.2 INJECTION, SOLUTION INTRAMUSCULAR; INTRAVENOUS; SUBCUTANEOUS at 00:22

## 2022-11-17 RX ADMIN — SODIUM CHLORIDE, POTASSIUM CHLORIDE, SODIUM LACTATE AND CALCIUM CHLORIDE 500 ML: 600; 310; 30; 20 INJECTION, SOLUTION INTRAVENOUS at 03:35

## 2022-11-17 RX ADMIN — DEXMEDETOMIDINE HYDROCHLORIDE 0.2 MCG/KG/HR: 400 INJECTION INTRAVENOUS at 05:35

## 2022-11-17 RX ADMIN — HYDROMORPHONE HYDROCHLORIDE 0.2 MG: 0.2 INJECTION, SOLUTION INTRAMUSCULAR; INTRAVENOUS; SUBCUTANEOUS at 01:30

## 2022-11-17 RX ADMIN — HEPARIN SODIUM 5000 UNITS: 5000 INJECTION, SOLUTION INTRAVENOUS; SUBCUTANEOUS at 21:28

## 2022-11-17 RX ADMIN — ACETAMINOPHEN 975 MG: 325 TABLET, FILM COATED ORAL at 00:09

## 2022-11-17 RX ADMIN — SENNOSIDES AND DOCUSATE SODIUM 1 TABLET: 50; 8.6 TABLET ORAL at 00:10

## 2022-11-17 RX ADMIN — OXYCODONE HYDROCHLORIDE 10 MG: 5 TABLET ORAL at 01:40

## 2022-11-17 RX ADMIN — SENNOSIDES AND DOCUSATE SODIUM 1 TABLET: 50; 8.6 TABLET ORAL at 21:27

## 2022-11-17 RX ADMIN — MUPIROCIN 0.5 G: 20 OINTMENT TOPICAL at 09:25

## 2022-11-17 RX ADMIN — LIDOCAINE 1 PATCH: 560 PATCH PERCUTANEOUS; TOPICAL; TRANSDERMAL at 21:32

## 2022-11-17 RX ADMIN — HEPARIN SODIUM 5000 UNITS: 5000 INJECTION, SOLUTION INTRAVENOUS; SUBCUTANEOUS at 14:04

## 2022-11-17 RX ADMIN — ASPIRIN 81 MG CHEWABLE TABLET 162 MG: 81 TABLET CHEWABLE at 07:52

## 2022-11-17 RX ADMIN — SODIUM CHLORIDE, POTASSIUM CHLORIDE, SODIUM LACTATE AND CALCIUM CHLORIDE: 600; 310; 30; 20 INJECTION, SOLUTION INTRAVENOUS at 10:27

## 2022-11-17 RX ADMIN — INSULIN ASPART 1 UNITS: 100 INJECTION, SOLUTION INTRAVENOUS; SUBCUTANEOUS at 17:55

## 2022-11-17 RX ADMIN — OXYCODONE HYDROCHLORIDE 10 MG: 5 TABLET ORAL at 05:59

## 2022-11-17 RX ADMIN — OXYCODONE HYDROCHLORIDE 5 MG: 5 TABLET ORAL at 14:53

## 2022-11-17 RX ADMIN — HYDROMORPHONE HYDROCHLORIDE 0.4 MG: 0.2 INJECTION, SOLUTION INTRAMUSCULAR; INTRAVENOUS; SUBCUTANEOUS at 04:15

## 2022-11-17 RX ADMIN — ACETAMINOPHEN 975 MG: 325 TABLET, FILM COATED ORAL at 16:48

## 2022-11-17 RX ADMIN — ROSUVASTATIN CALCIUM 40 MG: 20 TABLET, FILM COATED ORAL at 12:14

## 2022-11-17 RX ADMIN — GABAPENTIN 100 MG: 100 CAPSULE ORAL at 21:27

## 2022-11-17 RX ADMIN — Medication 40 MG: at 07:38

## 2022-11-17 RX ADMIN — CEFAZOLIN 1 G: 1 INJECTION, POWDER, FOR SOLUTION INTRAMUSCULAR; INTRAVENOUS at 11:04

## 2022-11-17 RX ADMIN — SODIUM CHLORIDE 1 UNITS/HR: 9 INJECTION, SOLUTION INTRAVENOUS at 09:08

## 2022-11-17 ASSESSMENT — ACTIVITIES OF DAILY LIVING (ADL)
ADLS_ACUITY_SCORE: 36
ADLS_ACUITY_SCORE: 32
ADLS_ACUITY_SCORE: 29
ADLS_ACUITY_SCORE: 32
ADLS_ACUITY_SCORE: 29
ADLS_ACUITY_SCORE: 36
ADLS_ACUITY_SCORE: 36

## 2022-11-17 NOTE — PROGRESS NOTES
Patient extubated at 0805, BS diminished/clear, patient cough is moderate, productive, patient is able to verbalize upon extubation, no strider present, no complications at this time. Pt on a Oximask at 4L SpO2 98% RT will continue to monitor.    Shobha Askew, RT

## 2022-11-17 NOTE — PLAN OF CARE
By the end of shift Pt following commands, moving all extremities equally and pupils reactive.  Pt SR-ST.  Pt on low dose Epinephrine drip per DAVIE Quinonez MD with nitroglycerin drip being titrated as needed.  IABP in place with 1:1 ratio and 70% augmentation.  Blood noted in IABP pressure tubing at 2345, ICU MD notified and abdominal xray obtained and MD stated to continue to monitor for increased blood amount.  500 mL LR bolus was given one time over 2 hours per CV surgeon.  Pt tolerating ventilator and was placed on pressure support at 0620 after Propofol infusion was titrated down and initiation of Precedex (see MAR).  Jasso catheter in place with  mL/hr this shift.  Pt received PRN oral and IV pain medications when showcasing signs and symptoms of increased pain (see MAR).  No new noted skin breakdown.

## 2022-11-17 NOTE — CONSULTS
CARDIAC SURGERY NUTRITION CONSULT    Received standing order to assess and educate patient.    Will follow and complete assessment once patient is extubated and/or is transferred to medical unit.    Patient will receive nutrition education during the Outpatient Cardiac Rehab Program (nutrition classes/dietitian counseling).    Maria L Berry RD, LD

## 2022-11-17 NOTE — PROGRESS NOTES
11/17/22 1500   Appointment Info   Signing Clinician's Name / Credentials (OT) Christa Solo, OTR/L   Rehab Comments (OT) Initial OT/CR Phase 1 Eval   Living Environment   Living Environment Comments pt lives alone in an apartment with an elevator. 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 pt plans to discharge home with family friend or son's house where there are stairs to enter the all needs on 1 level.   General Information   Onset of Illness/Injury or Date of Surgery 11/16/22  (to ICU)   Referring Physician Yanelis Brown, ANGY   Patient/Family Therapy Goal Statement (OT) to go home eventually   Additional Occupational Profile Info/Pertinent History of Current Problem 67 year old male with a history of active smoking, hypertension and hyperlipidemia who presented to [facility] on [facility]/15 with chest pain x 5 days. He was taken for coronary angiogram which noted multi-vessel CAD. CV Surgery was urgently consulted and an emergent CABG x 4 was performed. now POD 1   Existing Precautions/Restrictions fall;cardiac;sternal   General Observations and Info 10 lbs per sternal precautions   Cognitive Status Examination   Orientation Status orientation to person, place and time   Visual Perception   Visual Impairment/Limitations WFL   Sensory   Sensory Quick Adds sensation intact   Pain Assessment   Patient Currently in Pain Yes, see Vital Sign flowsheet   Posture   Posture forward head position;protracted shoulders   Range of Motion Comprehensive   Comment, General Range of Motion WFL for ADL   Strength Comprehensive (MMT)   Comment, General Manual Muscle Testing (MMT) Assessment  functionally WFL   Coordination   Upper Extremity Coordination No deficits were identified   Bed Mobility   Bed Mobility supine-sit   Supine-Sit [place] (Bed Mobility) maximum assist (25% patient effort)   Transfers   Transfers sit-stand transfer   Sit-Stand Transfer   Sit-Stand [place] (Transfers) minimum assist (75% patient effort)   Activities of Daily Living   BADL Assessment/Intervention lower body dressing;upper body dressing;bathing;toileting   Bathing Assessment/Intervention   [place] Level (Bathing) dependent (less than 25% patient effort)   Upper Body Dressing Assessment/Training   [place] Level (Upper Body Dressing) dependent (less than 25% patient effort)   Lower Body Dressing Assessment/Training   [place] Level (Lower Body Dressing) dependent (less than 25% patient effort)   Toileting   [place] Level (Toileting) dependent (less than 25% patient effort)   Clinical Impression   Criteria for Skilled Therapeutic Interventions Met (OT) Yes, treatment indicated   OT Diagnosis decreased function in ADL   Influenced by the following impairments ICU lines   OT Problem List-Impairments impacting ADL problems related to;activity tolerance impaired;pain;post-surgical precautions   Assessment of Occupational Performance 5 or more Performance Deficits   Identified Performance Deficits bathing, dressing, toileting, transfer, mobility   Planned Therapy Interventions (OT) ADL retraining;progressive activity/exercise;home program guidelines;risk factor education;transfer training;IADL retraining   Clinical Decision Making Complexity (OT) moderate complexity   Anticipated Equipment Needs Upon Discharge (OT)   (none)   Risk & Benefits of therapy have been explained evaluation/treatment results reviewed;care plan/treatment goals reviewed;risks/benefits reviewed;current/potential barriers reviewed;participants voiced agreement with care plan;participants included;patient   Clinical Impression  Comments decreased function in ADL and new CR prec warrants skilled IP OT tx   OT Total Evaluation Time   OT Eval, Moderate Complexity Minutes (29995) 15   OT Goals   Therapy Frequency (OT) 2 times/day   OT Predicted Duration/Target Date for Goal Attainment 11/23/22   OT Goals Hygiene/Grooming;Upper Body Dressing;Lower Body Dressing;Upper Body Bathing;Toilet Transfer/Toileting;Bed Mobility;OT Goal 1;Cardiac Phase 1   OT: Hygiene/Grooming modified independent;within precautions   OT: Upper Body Dressing Modified independent;within precautions   OT: Lower Body Dressing Modified independent;within precautions   OT: Upper Body Bathing Modified independent;within precautions   OT: Bed Mobility Modified independent;rolling;within precautions;supine to/from sitting   OT: Toilet Transfer/Toileting Modified independent;toilet transfer;cleaning and garment management;using adaptive equipment;within precautions   OT: Understanding of cardiac education to maximize quality of life, condition management, and health outcomes Patient;Caregiver;Verbalize   OT: Perform aerobic activity with stable cardiovascular response continuous;10 minutes   OT: Functional/aerobic ambulation tolerance with stable cardiovascular response in order to return to home and community environment Independent   OT: Navigation of stairs simulating home set up with stable cardiovascular response in order to return to home and community environment Modified independent;3 stairs   OT: Goal 1 Pt will verbalize 3 sternal precautions   Interventions   Interventions Quick Adds Self-Care/Home Management;Cardiac Rehab;Therapeutic Activity   Self-Care/Home Management   Self-Care/Home Mgmt/ADL, Compensatory, Meal Prep Minutes (00979) 10   Symptoms Noted During/After Treatment (Meal Preparation/Planning Training) none   Treatment Detail/Skilled Intervention completed ed for phase 1 CR including as below. verbalized understanding and able to teach back sternal  precautions and sign/sympos   Therapeutic Activities   Therapeutic Activity Minutes (17987) 24   Symptoms noted during/after treatment fatigue   Treatment Detail/Skilled Intervention pt seen for OT/CR agreeable. educated on mobility using log roll and to abide in precautions, able to demo with mod Ax2 and Vc, TC for safety. sit>Stand from EOB min Ax2. turn tx to chair CGAx2 for line mgmt, cues for pillow use and PLB technique. stood for 3 minutes. sat for rest. skilled monitoring of vitals completed. completed sit>Stand from chair with pillow, cues for body mecahnics, min Ax1. complete marching in place for 15 reps. standing rest break. cues for body mecahnics into sitting in chair. placed with pillows for comfort, educated on progression of activity. complete ankle pumps, knee extension and hold 10 repx2 sets in seated. complete g/h in seated VC for precautions. all needs in reach end of session   Cardiac Education   Education Provided Daily weights;Heart anatomy;Precautions;Resuming home activities;Signs and symptoms   Education Packet Given to Patient No   All Patient Education Handouts Reviewed with Patient and/or Family No   OT Discharge Planning   OT Plan CR ed. amb   OT Discharge Recommendation (DC Rec) home with outpatient cardiac rehab   OT Rationale for DC Rec below baseline function in self care/ADL and mobility and with new precautions. will benefit from OT and CR while IP to increase function and safety for home. anticipate will be able to complete basic ADL and mobility with mod I at discharge with assist for heavy IADL and OP CR for phase 2.   OT Brief overview of current status Ax2 for safety in ICU. moving well.   Total Session Time   Timed Code Treatment Minutes 34   Total Session Time (sum of timed and untimed services) 49

## 2022-11-17 NOTE — ANESTHESIA POSTPROCEDURE EVALUATION
Patient: Rodrigo Lanier    Procedure: Procedure(s):  CORONARY ARTERY BYPASS GRAFT x 4 (LEFT INTERNAL MAMMARY ARTERY - LEFT ANTERIOR DESCENDING ARTERY; SAPHENOUS VEIN - OBTUSE MARGINAL ARTERY; SAPHENOUS VEIN - RIGHT POSTERIOR DESCENDING ARTERY; SAPHENOUS VEIN - DIAGONAL ARTERY) WITH ENDOSCOPIC SAPHENOUS VEIN HARVEST ON THE LEFT LOWER EXTREMITY, AND ON CARDIOPULMONARY PUMP OXYGENATOR  (INTRAOPERATIVE TRANSESOPHAGEAL ECHOCARDIOGRAM BY ANESTHESIOLOGIST)       Anesthesia Type:  General    Note:  Disposition: ICU            ICU Sign Out: Anesthesiologist/ICU physician sign out WAS performed   Postop Pain Control:    PONV:    Neuro/Psych:    Airway/Respiratory:             Sign Out: AIRWAY IN SITU/Resp. Support   CV/Hemodynamics:    Other NRE:    DID A NON-ROUTINE EVENT OCCUR?     Event details/Postop Comments:  Patient transported from the OR to the ICU, intubated and sedated, while monitored.  Sign-out was given to the MD, RN and RT.  All questions were answered.  Transport was without incident.      Kaden Benz MD             Last vitals:  Vitals:    11/16/22 2200 11/16/22 2230 11/16/22 2245   BP:      Pulse:   99   Resp: 16 16 16   Temp:   36.4  C (97.5  F)   SpO2:   100%       Electronically Signed By: Kaden Benz MD  November 16, 2022  11:31 PM

## 2022-11-17 NOTE — PROGRESS NOTES
[facility]    Urology Progress Note     Assessment & Plan   68 yo male with NSTEMI undergoing coronary angiogram with potential need for CABG. Found to have a 1.3 cm stone at the right renal pelvis with mild prominence, potential low grade obstruction. He is starting dual antiplatelet therapy. Patient is not having flank pain and kidney function has been stable.     Plan:   -Prioritize cardiac interventions at this time  -Recommend ureteral stent placement at some point prior to discharge, once stabilized from a cardiac standpoint; will require eventual procedure for definitive stone management in the future   -The patient is not having flank pain and kidney function is stable. He does not have signs of UTI. No emergent intervention needed today. Would favor medical optimization from a cardiac standpoint prior to any procedure.    -Should patient become symptomatic or renal function impacted, will need to reevaluate.     Marily Dow PA-C  [facility]   [email or web address]  Text Page (7:30am to 4:30pm)      Interval History   Remains asymptomatic from a kidney stone standpoint, denies flank pain   Creat WNL at 0.8    AVSS    Physical Exam   Temp: (!) 100.6  F (38.1  C) Temp src: Bladder  Pulse: 105   Resp: 11 SpO2: 93 % O2 Device: Oxymask Oxygen Delivery: 4 LPM  Vitals:    11/16/22 0020 11/17/22 0445   Weight: 72.6 kg (160 lb) 73.6 kg (162 lb 4.1 oz)     Vital Signs with Ranges  Temp:  [97.3  F (36.3  C)-101.3  F (38.5  C)] 100.6  F (38.1  C)  Pulse:  [] 105  Resp:  [5-28] 11  MAP:  [60 mmHg-92 mmHg] 77 mmHg  Arterial Line BP: ()/(45-63) 104/62  FiO2 (%):  [40 %-50 %] 40 %  SpO2:  [93 %-100 %] 93 %  I/O last 3 completed shifts:  In: 5104 [P.O.:50; I.V.:4014; Other:300; NG/GT:240; IV Piggyback:500]  Out: 2015 [Urine:1225; Emesis/NG output:250; Chest Tube:540]    General: Alert and oriented, no distress  HEENT: Face symmetric, mucous membranes moist  and pink  Eyes: No scleral icterus  Neck: Symmetric  Chest wall: Symmetric  Respiratory: Breathing unlabored, no audible wheezing  Cardiac: Extremities warm and well perfused   Abdomen: soft, non tender, non distended   Back: No CVA or flank tenderness  Extremities: No evidence of deformities or trauma  Neuro:Grossly non focal  Pysch: Normal mood and affect  Skin: No evident rashes or lesions    Medications     dexmedetomidine Stopped (22 0755)     EPINEPHrine 0.02 mcg/kg/min (22 1005)     insulin regular 1 Units/hr (22 1207)     lactated ringers 250 mL/hr at 22 1027     lactated ringers 10 mL/hr at 22 0751     propofol Stopped (22 0626)    And     - MEDICATION INSTRUCTIONS -       nitroGLYcerin Stopped (22 0712)     phenylephrine Stopped (22 1114)     BETA BLOCKER NOT PRESCRIBED       vasopressin         acetaminophen  975 mg Oral Q8H     aspirin  162 mg Oral or NG Tube Daily     ceFAZolin  1 g Intravenous Q8H     chlorhexidine  15 mL Mouth/Throat Q12H     gabapentin  100 mg Oral At Bedtime     heparin ANTICOAGULANT  5,000 Units Subcutaneous Q8H     Lidocaine  1-2 patch Transdermal Q24H     lidocaine   Transdermal Q8H JJ     mupirocin  0.5 g Both Nostrils BID     pantoprazole  40 mg Per Feeding Tube QAM AC    Or     pantoprazole  40 mg Intravenous QAM AC     polyethylene glycol  17 g Oral Daily     rosuvastatin  40 mg Oral Daily     senna-docusate  1 tablet Oral BID     sodium chloride (PF)  3 mL Intracatheter Q8H       Data   Results for orders placed or performed during the hospital encounter of 22 (from the past 24 hour(s))   Echocardiogram Complete   Result Value Ref Range    LVEF  50-55%     Narrative    [id number]  [id number]  [id number]  [id number]^QUINTANILLA^VIKKI^[facility]  Echocardiography Laboratory  [address] [place] [address]     Name: VITO WARD  MRN: [id number]  : 1955  Study Date: 2022 10:50 AM  Age:  67 yrs  Gender: Male  Patient Location: [facility]  Reason For Study: Chest Tightness  Ordering Physician: VIKKI QUINTANILLA  Referring Physician: Virginia Winters  Performed By: Nelson Rodarte RDCS     BSA: 1.9 m2  Height: 68 in  Weight: 160 lb  HR: 71  BP: 140/80 mmHg  ______________________________________________________________________________  Procedure  Complete Portable Echo Adult. Optison (NDC #3381-9764) given intravenously.  ______________________________________________________________________________  Interpretation Summary     The left ventricle is normal in size.  There is mild concentric left ventricular hypertrophy.  The visual ejection fraction is 50-55%.  There is moderate inferolateral wall hypokinesis.  There is moderate lateral wall hypokinesis.  There is mild to moderate inferior wall hypokinesis.  No significant valvular heart disease.  ______________________________________________________________________________  Left Ventricle  The left ventricle is normal in size. There is mild concentric left  ventricular hypertrophy. The visual ejection fraction is 50-55%. Grade I or  early diastolic dysfunction. There is moderate inferolateral wall hypokinesis.  There is moderate lateral wall hypokinesis. There is mild to moderate inferior  wall hypokinesis.     Right Ventricle  The right ventricle is normal in size and function.     Atria  Normal left atrial size. Right atrial size is normal. There is no color  Doppler evidence of an atrial shunt.     Mitral Valve  The mitral valve leaflets are mildly thickened. There is mild (1+) mitral  regurgitation.     Tricuspid Valve  There is trace tricuspid regurgitation. IVC diameter and respiratory changes  fall into an intermediate range suggesting an RA pressure of 8 mmHg.     Aortic Valve  There is trivial trileaflet aortic sclerosis. No aortic regurgitation is  present.     Pulmonic Valve  There is trace pulmonic valvular regurgitation.      Vessels  Normal size aorta. Borderline aortic root dilatation.     Pericardium  There is no pericardial effusion.     Rhythm  Sinus rhythm was noted.  ______________________________________________________________________________  MMode/2D Measurements & Calculations  IVSd: 1.3 cm     LVIDd: 4.8 cm  LVIDs: 3.5 cm  LVPWd: 0.93 cm  FS: 28.6 %  LV mass(C)d: 195.2 grams  LV mass(C)dI: 105.0 grams/m2  Ao root diam: 3.7 cm  LA dimension: 3.8 cm  asc Aorta Diam: 3.4 cm  LA/Ao: 1.0  LA Volume (BP): 56.6 ml  LA Volume Index (BP): 30.4 ml/m2  RWT: 0.38     Doppler Measurements & Calculations  MV E max angela: 59.7 cm/sec  MV A max angela: 95.1 cm/sec  MV E/A: 0.63  MV dec slope: 345.8 cm/sec2  E/E' av.3  Lateral E/e': 12.2  Medial E/e': 12.5     ______________________________________________________________________________  Report approved by: Javier Conklin 2022 02:12 PM         Cardiac Catheterization    Narrative      Prox Cx to Mid Cx lesion is 100% stenosed.    Ost LAD to Prox LAD lesion is 70% stenosed.    1st Mrg lesion is 90% stenosed.    3rd Diag lesion is 60% stenosed.    2nd Diag lesion is 80% stenosed.    Prox LAD to Mid LAD lesion is 30% stenosed.    Dist LAD lesion is 50% stenosed.    1st Diag lesion is 90% stenosed.     Three vessel coronary artery disease (100% mLcx occlusion, 90% proximal   OM1 stenosis, 70% pLAD, 90% proximal diagonal 1, 80% diagonal 2, 60%   diagonal 3 stenosis, and severely diseased small RCA). Likely patient has   left dominant system.    NSTEMI  Intraortic balloon pump insertion via RFA approach (sheath secured and   sutured). Refractory angina while on IABP.   Hemostasis of LRA with TR band     Activated clotting time celite, POCT   Result Value Ref Range    Activated Clotting Time (Celite) POCT 165 (H) 74 - 150 seconds   ABO/Rh type and screen    Narrative    The following orders were created for panel order ABO/Rh type and screen.  Procedure                                Abnormality         Status                     ---------                               -----------         ------                     Adult Type and Screen[[id number]]                            Final result                 Please view results for these tests on the individual orders.   Adult Type and Screen   Result Value Ref Range    ABO/RH(D) B POS     Antibody Screen Negative Negative    SPECIMEN EXPIRATION DATE 20221119235900    ABO and Rh   Result Value Ref Range    ABO/RH(D) B POS     SPECIMEN EXPIRATION DATE [id number]    Prepare red blood cells (unit)   Result Value Ref Range    Blood Component Type Red Blood Cells     Product Code G6340J56     Unit Status Returned     Unit Number [id number]     CROSSMATCH Compatible     CODING SYSTEM [id number]     ISSUE DATE AND TIME 20221116153400     UNIT ABO/RH B+     UNIT TYPE ISBT 7300    Prepare red blood cells (unit)   Result Value Ref Range    Blood Component Type Red Blood Cells     Product Code A4683M08     Unit Status Returned     Unit Number [id number]     CROSSMATCH Compatible     CODING SYSTEM [id number]     ISSUE DATE AND TIME [id number]     UNIT ABO/RH B+     UNIT TYPE ISBT 7300    Arterial Panel POCT (Lab Only)   Result Value Ref Range    pH Arterial POCT 7.33 (L) 7.35 - 7.45    pCO2 Arterial POCT 48 (H) 35 - 45 mm Hg    pO2 Arterial POCT 146 (H) 80 - 105 mm Hg    Bicarbonate Arterial POCT 26 21 - 28 mmol/L    Sodium POCT 140 133 - 144 mmol/L    Potassium POCT 3.9 3.5 - 5.0 mmol/L    Hemoglobin POCT 14.7 13.3 - 17.7 g/dL    Glucose Whole Blood POCT 171 (H) 70 - 99 mg/dL    Calcium, Ionized Whole Blood POCT 4.8 4.4 - 5.2 mg/dL    Base Excess/Deficit (+/-) POCT -0.9 -9.6 - 2.0 mmol/L    FIO2 POCT 60.0 %    Lactic Acid POCT 0.9 <=2.0 mmol/L   Glucose by meter   Result Value Ref Range    GLUCOSE BY METER POCT 204 (H) 70 - 99 mg/dL   Venous Panel POCT (Lab Only)   Result Value Ref Range    pH Venous POCT 7.22 (L) 7.32 - 7.43    pCO2 Venous POCT 63 (H)  40 - 50 mm Hg    pO2 Venous POCT 59 (H) 25 - 47 mm Hg    Bicarbonate Venous POCT 26 21 - 28 mmol/L    Sodium POCT 140 133 - 144 mmol/L    Potassium POCT 4.6 3.5 - 5.0 mmol/L    Hemoglobin POCT 11.3 (L) 13.3 - 17.7 g/dL    Glucose Whole Blood POCT 169 (H) 70 - 99 mg/dL    Base Excess/Deficit (+/-) POCT -3.1 -8.1 - 1.9 mmol/L    Calcium, Ionized Whole Blood POCT 3.9 (L) 4.4 - 5.2 mg/dL    Lactic Acid POCT 1.4 <=2.0 mmol/L    FIO2 POCT 75.0 %   Arterial Panel POCT (Lab Only)   Result Value Ref Range    pH Arterial POCT 7.27 (L) 7.35 - 7.45    pCO2 Arterial POCT 54 (H) 35 - 45 mm Hg    pO2 Arterial POCT 425 (H) 80 - 105 mm Hg    Bicarbonate Arterial POCT 25 21 - 28 mmol/L    Sodium POCT 139 133 - 144 mmol/L    Potassium POCT 4.5 3.5 - 5.0 mmol/L    Hemoglobin POCT 11.3 (L) 13.3 - 17.7 g/dL    Glucose Whole Blood POCT 177 (H) 70 - 99 mg/dL    Calcium, Ionized Whole Blood POCT 4.0 (L) 4.4 - 5.2 mg/dL    Base Excess/Deficit (+/-) POCT -3.0 -9.6 - 2.0 mmol/L    FIO2 POCT 75.0 %    Lactic Acid POCT 1.4 <=2.0 mmol/L   Arterial Panel POCT (Lab Only)   Result Value Ref Range    pH Arterial POCT 7.31 (L) 7.35 - 7.45    pCO2 Arterial POCT 46 (H) 35 - 45 mm Hg    pO2 Arterial POCT 382 (H) 80 - 105 mm Hg    Bicarbonate Arterial POCT 23 21 - 28 mmol/L    Sodium POCT 139 133 - 144 mmol/L    Potassium POCT 5.3 (H) 3.5 - 5.0 mmol/L    Hemoglobin POCT 11.8 (L) 13.3 - 17.7 g/dL    Glucose Whole Blood POCT 175 (H) 70 - 99 mg/dL    Calcium, Ionized Whole Blood POCT 4.0 (L) 4.4 - 5.2 mg/dL    Base Excess/Deficit (+/-) POCT -3.2 -9.6 - 2.0 mmol/L    FIO2 POCT 70.0 %    Lactic Acid POCT 1.3 <=2.0 mmol/L   Arterial Panel POCT (Lab Only)   Result Value Ref Range    pH Arterial POCT 7.37 7.35 - 7.45    pCO2 Arterial POCT 39 35 - 45 mm Hg    pO2 Arterial POCT 386 (H) 80 - 105 mm Hg    Bicarbonate Arterial POCT 22 21 - 28 mmol/L    Sodium POCT 139 133 - 144 mmol/L    Potassium POCT 5.2 (H) 3.5 - 5.0 mmol/L    Hemoglobin POCT 11.1 (L) 13.3 - 17.7  g/dL    Glucose Whole Blood POCT 176 (H) 70 - 99 mg/dL    Calcium, Ionized Whole Blood POCT 4.1 (L) 4.4 - 5.2 mg/dL    Base Excess/Deficit (+/-) POCT -2.8 -9.6 - 2.0 mmol/L    FIO2 POCT 70.0 %    Lactic Acid POCT 1.3 <=2.0 mmol/L   Arterial Panel POCT (Lab Only)   Result Value Ref Range    pH Arterial POCT 7.39 7.35 - 7.45    pCO2 Arterial POCT 36 35 - 45 mm Hg    pO2 Arterial POCT 382 (H) 80 - 105 mm Hg    Bicarbonate Arterial POCT 22 21 - 28 mmol/L    Sodium POCT 139 133 - 144 mmol/L    Potassium POCT 5.1 (H) 3.5 - 5.0 mmol/L    Hemoglobin POCT 11.0 (L) 13.3 - 17.7 g/dL    Glucose Whole Blood POCT 177 (H) 70 - 99 mg/dL    Calcium, Ionized Whole Blood POCT 4.1 (L) 4.4 - 5.2 mg/dL    Base Excess/Deficit (+/-) POCT -2.6 -9.6 - 2.0 mmol/L    FIO2 POCT 70.0 %    Lactic Acid POCT 1.3 <=2.0 mmol/L   Arterial Panel POCT (Lab Only)   Result Value Ref Range    pH Arterial POCT 7.41 7.35 - 7.45    pCO2 Arterial POCT 40 35 - 45 mm Hg    pO2 Arterial POCT 372 (H) 80 - 105 mm Hg    Bicarbonate Arterial POCT 25 21 - 28 mmol/L    Sodium POCT 140 133 - 144 mmol/L    Potassium POCT 4.8 3.5 - 5.0 mmol/L    Hemoglobin POCT 10.4 (L) 13.3 - 17.7 g/dL    Glucose Whole Blood POCT 164 (H) 70 - 99 mg/dL    Calcium, Ionized Whole Blood POCT 3.9 (L) 4.4 - 5.2 mg/dL    Base Excess/Deficit (+/-) POCT 0.7 -9.6 - 2.0 mmol/L    FIO2 POCT 70.0 %    Lactic Acid POCT 1.7 <=2.0 mmol/L   CBC with platelets   Result Value Ref Range    WBC Count 20.9 (H) 4.0 - 11.0 10e3/uL    RBC Count 3.86 (L) 4.40 - 5.90 10e6/uL    Hemoglobin 11.7 (L) 13.3 - 17.7 g/dL    Hematocrit 34.2 (L) 40.0 - 53.0 %    MCV 89 78 - 100 fL    MCH 30.3 26.5 - 33.0 pg    MCHC 34.2 31.5 - 36.5 g/dL    RDW 12.2 10.0 - 15.0 %    Platelet Count 145 (L) 150 - 450 10e3/uL   Basic metabolic panel   Result Value Ref Range    Sodium 141 133 - 144 mmol/L    Potassium 4.2 3.4 - 5.3 mmol/L    Chloride 113 (H) 94 - 109 mmol/L    Carbon Dioxide (CO2) 24 20 - 32 mmol/L    Anion Gap 4 3 - 14 mmol/L     Urea Nitrogen 15 7 - 30 mg/dL    Creatinine 0.80 0.66 - 1.25 mg/dL    Calcium 8.5 8.5 - 10.1 mg/dL    Glucose 160 (H) 70 - 99 mg/dL    GFR Estimate >90 >60 mL/min/1.73m2   Fibrinogen activity   Result Value Ref Range    Fibrinogen Activity 234 170 - 490 mg/dL   Partial thromboplastin time   Result Value Ref Range    aPTT 28 22 - 38 Seconds   INR   Result Value Ref Range    INR 1.52 (H) 0.85 - 1.15   Arterial Panel POCT (Lab Only)   Result Value Ref Range    pH Arterial POCT 7.37 7.35 - 7.45    pCO2 Arterial POCT 41 35 - 45 mm Hg    pO2 Arterial POCT 404 (H) 80 - 105 mm Hg    Bicarbonate Arterial POCT 24 21 - 28 mmol/L    Sodium POCT 142 133 - 144 mmol/L    Potassium POCT 4.2 3.5 - 5.0 mmol/L    Hemoglobin POCT 11.9 (L) 13.3 - 17.7 g/dL    Glucose Whole Blood POCT 152 (H) 70 - 99 mg/dL    Calcium, Ionized Whole Blood POCT 5.0 4.4 - 5.2 mg/dL    Base Excess/Deficit (+/-) POCT -1.8 -9.6 - 2.0 mmol/L    FIO2 POCT 100.0 %    Lactic Acid POCT 2.7 (H) <=2.0 mmol/L   Glucose by meter   Result Value Ref Range    GLUCOSE BY METER POCT 122 (H) 70 - 99 mg/dL   INR   Result Value Ref Range    INR 1.28 (H) 0.85 - 1.15   Partial thromboplastin time   Result Value Ref Range    aPTT 32 22 - 38 Seconds   CBC with platelets   Result Value Ref Range    WBC Count 21.4 (H) 4.0 - 11.0 10e3/uL    RBC Count 4.22 (L) 4.40 - 5.90 10e6/uL    Hemoglobin 12.9 (L) 13.3 - 17.7 g/dL    Hematocrit 36.9 (L) 40.0 - 53.0 %    MCV 87 78 - 100 fL    MCH 30.6 26.5 - 33.0 pg    MCHC 35.0 31.5 - 36.5 g/dL    RDW 12.3 10.0 - 15.0 %    Platelet Count 162 150 - 450 10e3/uL   Comprehensive metabolic panel   Result Value Ref Range    Sodium 144 133 - 144 mmol/L    Potassium 4.0 3.4 - 5.3 mmol/L    Chloride 116 (H) 94 - 109 mmol/L    Carbon Dioxide (CO2) 23 20 - 32 mmol/L    Anion Gap 5 3 - 14 mmol/L    Urea Nitrogen 16 7 - 30 mg/dL    Creatinine 0.82 0.66 - 1.25 mg/dL    Calcium 7.9 (L) 8.5 - 10.1 mg/dL    Glucose 130 (H) 70 - 99 mg/dL    Alkaline Phosphatase  56 40 - 150 U/L     (H) 0 - 45 U/L    ALT 32 0 - 70 U/L    Protein Total 5.0 (L) 6.8 - 8.8 g/dL    Albumin 2.6 (L) 3.4 - 5.0 g/dL    Bilirubin Total 0.4 0.2 - 1.3 mg/dL    GFR Estimate >90 >60 mL/min/1.73m2   Lactic acid whole blood   Result Value Ref Range    Lactic Acid 2.9 (H) 0.7 - 2.0 mmol/L   Ionized Calcium   Result Value Ref Range    Calcium Ionized 4.6 4.4 - 5.2 mg/dL   Magnesium   Result Value Ref Range    Magnesium 2.7 (H) 1.6 - 2.3 mg/dL   Phosphorus   Result Value Ref Range    Phosphorus 2.5 2.5 - 4.5 mg/dL   Blood gas venous with oxyhemoglobin   Result Value Ref Range    pH Venous 7.34 7.32 - 7.43    pCO2 Venous 43 40 - 50 mm Hg    pO2 Venous 101 (H) 25 - 47 mm Hg    Bicarbonate Venous 23 21 - 28 mmol/L    FIO2 50     Oxyhemoglobin Venous 97 (H) 70 - 75 %    Base Excess/Deficit (+/-) -2.6 -7.7 - 1.9 mmol/L   Fibrinogen activity   Result Value Ref Range    Fibrinogen Activity 233 170 - 490 mg/dL   XR Chest Port 1 View    Narrative    EXAM: XR CHEST PORT 1 VIEW  LOCATION: [facility]  DATE/TIME: 11/16/2022 10:05 PM    INDICATION: Post Op CVTS Surgery  COMPARISON: CT from 11/16/2022      Impression    IMPRESSION: Status post median sternotomy. Endotracheal tube tip terminates about 3.5 cm above the neeta. Enteric tube terminates below the diaphragm. Mediastinal drains and left chest tube in place. Right IJ approach pulmonary artery catheter tip   overlies the right lower lobe pulmonary artery. Mild prominence of the central interstitium. No visible pneumothorax or pleural effusion.   Blood gas arterial   Result Value Ref Range    pH Arterial 7.36 7.35 - 7.45    pCO2 Arterial 42 35 - 45 mm Hg    pO2 Arterial 157 (H) 80 - 105 mm Hg    FIO2 50     Bicarbonate Arterial 24 21 - 28 mmol/L    Base Excess/Deficit (+/-) -1.9 -9.0 - 1.8 mmol/L   Blood gas venous with oxyhemoglobin   Result Value Ref Range    pH Venous 7.33 7.32 - 7.43    pCO2 Venous 47 40 - 50 mm Hg    pO2 Venous 40  25 - 47 mm Hg    Bicarbonate Venous 25 21 - 28 mmol/L    FIO2 50     Oxyhemoglobin Venous 72 70 - 75 %    Base Excess/Deficit (+/-) -1.4 -7.7 - 1.9 mmol/L   Glucose by meter   Result Value Ref Range    GLUCOSE BY METER POCT 137 (H) 70 - 99 mg/dL   Glucose by meter   Result Value Ref Range    GLUCOSE BY METER POCT 127 (H) 70 - 99 mg/dL   XR Abdomen Port 1 View    Narrative    EXAM: XR ABDOMEN PORT 1 VIEW  LOCATION: [facility]  DATE/TIME: 11/16/2022 11:50 PM    INDICATION: Verify OG tube.  COMPARISON: None.      Impression    IMPRESSION: Enteric tube with tip in the proximal body of the stomach. Sidehole is just below the GE junction. Nonspecific bowel gas pattern. Multiple additional lines and tubes noted over the lower chest.   Lactic acid whole blood   Result Value Ref Range    Lactic Acid 1.4 0.7 - 2.0 mmol/L   Blood gas arterial   Result Value Ref Range    pH Arterial 7.41 7.35 - 7.45    pCO2 Arterial 36 35 - 45 mm Hg    pO2 Arterial 152 (H) 80 - 105 mm Hg    FIO2 50     Bicarbonate Arterial 23 21 - 28 mmol/L    Base Excess/Deficit (+/-) -1.8 -9.0 - 1.8 mmol/L   Glucose by meter   Result Value Ref Range    GLUCOSE BY METER POCT 137 (H) 70 - 99 mg/dL   Lactic acid whole blood   Result Value Ref Range    Lactic Acid 1.3 0.7 - 2.0 mmol/L   CBC with platelets   Result Value Ref Range    WBC Count 14.3 (H) 4.0 - 11.0 10e3/uL    RBC Count 4.01 (L) 4.40 - 5.90 10e6/uL    Hemoglobin 12.0 (L) 13.3 - 17.7 g/dL    Hematocrit 34.8 (L) 40.0 - 53.0 %    MCV 87 78 - 100 fL    MCH 29.9 26.5 - 33.0 pg    MCHC 34.5 31.5 - 36.5 g/dL    RDW 12.6 10.0 - 15.0 %    Platelet Count 163 150 - 450 10e3/uL   Magnesium   Result Value Ref Range    Magnesium 2.3 1.6 - 2.3 mg/dL   Ionized Calcium   Result Value Ref Range    Calcium Ionized 4.5 4.4 - 5.2 mg/dL   Phosphorus   Result Value Ref Range    Phosphorus 2.6 2.5 - 4.5 mg/dL   Blood gas arterial   Result Value Ref Range    pH Arterial 7.42 7.35 - 7.45    pCO2  Arterial 34 (L) 35 - 45 mm Hg    pO2 Arterial 117 (H) 80 - 105 mm Hg    FIO2 40     Bicarbonate Arterial 22 21 - 28 mmol/L    Base Excess/Deficit (+/-) -1.7 -9.0 - 1.8 mmol/L   Comprehensive metabolic panel   Result Value Ref Range    Sodium 142 133 - 144 mmol/L    Potassium 3.9 3.4 - 5.3 mmol/L    Chloride 115 (H) 94 - 109 mmol/L    Carbon Dioxide (CO2) 24 20 - 32 mmol/L    Anion Gap 3 3 - 14 mmol/L    Urea Nitrogen 15 7 - 30 mg/dL    Creatinine 0.80 0.66 - 1.25 mg/dL    Calcium 7.7 (L) 8.5 - 10.1 mg/dL    Glucose 146 (H) 70 - 99 mg/dL    Alkaline Phosphatase 49 40 - 150 U/L     (H) 0 - 45 U/L    ALT 49 0 - 70 U/L    Protein Total 4.9 (L) 6.8 - 8.8 g/dL    Albumin 2.5 (L) 3.4 - 5.0 g/dL    Bilirubin Total 0.4 0.2 - 1.3 mg/dL    GFR Estimate >90 >60 mL/min/1.73m2   Glucose by meter   Result Value Ref Range    GLUCOSE BY METER POCT 128 (H) 70 - 99 mg/dL   XR Chest Port 1 View    Narrative    EXAM: XR CHEST PORT 1 VIEW  LOCATION: [facility]  DATE/TIME: 11/17/2022 5:10 AM    INDICATION: Post Op CVTS Surgery  COMPARISON: Radiograph 11/16/2022      Impression    IMPRESSION: Status post median sternotomy. Endotracheal tube tip terminates about 6 cm above the neeta, at the level of the mid-clavicular heads. Enteric tube courses below the diaphragm with side-port overlying the stomach and tip below the field of   view. Mediastinal drains and left chest tube in place. Right IJ approach pulmonary artery catheter tip overlies the right lower lobe pulmonary artery. Mild prominence of the central interstitium. Bibasilar subsegmental atelectasis. No visible   pneumothorax or pleural effusion. No acute osseous abnormality.   EKG 12-lead, tracing only   Result Value Ref Range    Systolic Blood Pressure  mmHg    Diastolic Blood Pressure  mmHg    Ventricular Rate 103 BPM    Atrial Rate 103 BPM    CO Interval 134 ms    QRS Duration 74 ms     ms    QTc 474 ms    P Axis 68 degrees    R AXIS 74  degrees    T Axis 54 degrees    Interpretation ECG       Sinus tachycardia  Cannot rule out Inferior infarct (cited on or before 16-NOV-2022)  Abnormal ECG  When compared with ECG of 16-NOV-2022 01:05,  Vent. rate has increased BY  35 BPM  ST elevation now present in Anterior leads  T wave inversion more evident in Inferior leads  T wave inversion less evident in Lateral leads  QT has lengthened     Glucose by meter   Result Value Ref Range    GLUCOSE BY METER POCT 153 (H) 70 - 99 mg/dL   Glucose by meter   Result Value Ref Range    GLUCOSE BY METER POCT 140 (H) 70 - 99 mg/dL   Glucose by meter   Result Value Ref Range    GLUCOSE BY METER POCT 151 (H) 70 - 99 mg/dL

## 2022-11-17 NOTE — OP NOTE
DATE OF SERVICE: 11/16/22     PREOPERATIVE DIAGNOSES:  1.  Severe multivessel coronary artery disease.  2.  Non ST elevation myocardial infarction  3.  Type 2 diabetes mellitus  4.  Hypertension  5.  Chronic tobacco use       POSTOPERATIVE DIAGNOSES:  1.  Severe multivessel coronary artery disease.  2.  Non ST elevation myocardial infarction  3.  Type 2 diabetes mellitus  4.  Hypertension  5.  Chronic tobacco use     PROCEDURE PERFORMED:  1.  Coronary artery bypass grafting x 4   -reversed saphenous vein graft to the right posterior descending coronary artery  -reversed saphenous vein graft to the obtuse marginal branch of the left circumflex coronary artery  -reversed saphenous vein graft to the second diagonal branch of the left anterior descending coronary artery  -pedicled left internal mammary artery to left anterior descending coronary artery).  2.  Endoscopic vein harvest of the greater saphenous vein from the left lower extremity.     SURGEON:  YANET Quinonez MD     CO-SURGEON:  Kameron Cook MD; a co-surgeon was necessary due to the lack of a qualified assistant for the duration of the procedure    SECOND ASSISTANT: Yanelis Brown PA-C     ANESTHESIA:  General endotracheal anesthesia.     ANESTHESIOLOGIST:  Kaden Benz MD     ESTIMATED BLOOD LOSS:  500 mL     SPECIMEN:  None.    CPB/XC:     INDICATIONS FOR PROCEDURE:  Rodrigo Lanier is a 67-year-old male who presented with an NSTEMI. Coronary angiography demonstrated severe multivessel coronary artery disease. Echocardiography demonstrated preserved left ventricular function, with inferolateral wall motion abnormalities and no significant valvular abnormality. During his angiogram, he continued to experience persistent chest pain that was unrelieved by placement of an intra-aortic balloon pump and initiation of both heparin and nitroglycerin infusions. Due to persistent symptoms, emergency CABG was recommended. The patient understood the risks and  benefits of the procedure and wished to undergo the operation.     OPERATIVE FINDINGS:  The left internal mammary artery was 2 mm in diameter and had excellent flow. The greater saphenous vein from the left lower extremity was 3mm in diameter and a suitable conduit for bypass.  The ascending aorta was free of calcified plaque. The right posterior descending coronary artery was 1.75 mm in diameter and free of disease at the site of anastomosis. The obtuse marginal branch of the left circumflex coronary artery was 2 mm in diameter and free of disease at the site of anastomosis. The diagonal branch of the left anterior descending coronary artery was 1.75 mm in diameter and free of disease at the site anastomosis.  The left anterior descending coronary artery 2 mm in diameter and free of disease at the site of anastomosis.  After reperfusion, sinus rhythm was restored.  Left ventricular function was 50% preoperatively and unchanged after bypass on moderate-dose inotropic support.     OPERATIVE DESCRIPTION IN DETAIL:  After obtaining informed consent, the patient was brought to the operating room and placed in the supine position on the operating room table. Appropriate lines and devices for monitoring were placed by the anesthesia team. The patient underwent smooth induction of general anesthesia, and the trachea was intubated orally. A right internal jugular Cordis introducer was placed, and a Blythewood-Lorrie catheter was placed through this. The patient was prepped and draped, and a timeout was performed to confirm the correct patient identity, as well as the procedure to be performed. A median sternotomy was performed and the left internal mammary artery was harvested. The greater saphenous vein was harvested from the left lower extremity using endoscopic vein harvesting by the physician assistant, Yanelis Brown PA-C.     The patient was given full dose heparin to achieve an activated clotting time over 480 seconds.  Following this, the distal ascending aorta and the right atrium was cannulated. Next, both retrograde and antegrade cardioplegia cannulae were placed. Cardiopulmonary bypass was commenced. Cardioplegia was created. The vein conduit and targets were inspected. The heart was arrested with 1 liter of cold blood cardioplegia antegrade, followed by 200 ml retrograde. Subsequent maintenance doses of 300 mL of cold retrograde blood cardioplegia were given approximately every 20 minutes or after each distal anastomosis. Topical cold saline slush was applied for additional protection.     The following grafts were constructed in end-to-side fashion using running 7-0 Prolene:  A reversed saphenous vein graft was sewn to the proximal right posterior descending coronary artery, a reversed saphenous vein graft was sewn to the mid obtuse marginal branch of the left circumflex coronary artery, and a reversed saphenous vein graft was sewn to the second diagonal branch of the left anterior descending coronary artery. The pedicled left internal mammary artery was sewn to the mid-distal left anterior descending coronary artery in end-to-side fashion using running 8-0 Prolene.  The proximal anastomoses of the 3 vein grafts were constructed on the ascending aorta in end-to-side fashion using running 6-0 Prolene under a single crossclamp.  The crossclamp was released after a retrograde dose of terminal warm blood cardioplegia was delivered, and the heart was resuscitated.       All anastomoses were inspected and determined to be hemostatic. A bipolar ventricular pacing lead was placed and brought out through a separate stab incision. Ventilation was resumed. The patient weaned from cardiopulmonary bypass, was given protamine and decannulated.  A 28-Andorran angled chest tube was placed in the left pleural space and two 32-Andorran chest tubes were placed in the anterior mediastinum and brought out through separate stab incisions. The sternal  edges were reapposed with 3 interrupted #6 stainless steel sternal wires in the manubrium, 3 double wires in the body of the sternum and 1 additional #6 stainless steel sternal wire at the lower aspect of the sternum.     All sponge, instrument, and needle counts were correct at the end of the case.      YANET Quinonez MD  Cardiothoracic Surgery  Cell: [phone]; Pager [phone]

## 2022-11-17 NOTE — PROGRESS NOTES
[facility] ICU RESPIRATORY NOTE  Date of Admission: 11/16/22  Date of Intubation (most recent): 11/16/22  Reason for Mechanical Ventilation: Post op CABG  Number of Days on Mechanical Ventilation: 2  Met Criteria for Pressure Support Trial: No  Length of Pressure Support Trial:    Reason for Stopping Pressure Support Trial:  Reason for No Pressure Support Trial: Per MD     Significant Events Today: Pt arrived intubated in ICU     ABG Results:   Recent Labs   Lab 11/17/22  0030 11/16/22  2232 11/16/22  2149   PH 7.41 7.36  --    PCO2 36 42  --    PO2 152* 157*  --    HCO3 23 24  --    O2PER 50 50  50 50     Vent Mode: CMV/AC  (Continuous Mandatory Ventilation/ Assist Control)  FiO2 (%): 40 %  Resp Rate (Set): 20 breaths/min  Tidal Volume (Set, mL): 480 mL  PEEP (cm H2O): 5 cmH2O  Resp: 20    CRISTIAN Sánchez, RRT

## 2022-11-17 NOTE — PLAN OF CARE
Notified provider about indwelling mcdaniel catheter discussed removal or continued need.    Did provider choose to remove indwelling mcdaniel catheter? No    Provider's mcdaniel indication for keeping indwelling mcdaniel catheter: Strict 1-2 Hour I & O if external catheters are not an option.    Is there an order for indwelling mcdaniel catheter? Yes    *If there is a plan to keep mcdaniel catheter in place at discharge daily notification with provider is not necessary.

## 2022-11-17 NOTE — ANESTHESIA CARE TRANSFER NOTE
Patient: Rodrigo Lanier    Procedure: Procedure(s):  CORONARY ARTERY BYPASS GRAFT x 4 (LEFT INTERNAL MAMMARY ARTERY - LEFT ANTERIOR DESCENDING ARTERY; SAPHENOUS VEIN - OBTUSE MARGINAL ARTERY; SAPHENOUS VEIN - RIGHT POSTERIOR DESCENDING ARTERY; SAPHENOUS VEIN - DIAGONAL ARTERY) WITH ENDOSCOPIC SAPHENOUS VEIN HARVEST ON THE LEFT LOWER EXTREMITY, AND ON CARDIOPULMONARY PUMP OXYGENATOR  (INTRAOPERATIVE TRANSESOPHAGEAL ECHOCARDIOGRAM BY ANESTHESIOLOGIST)       Diagnosis: Heart abnormality [Q24.9]  Diagnosis Additional Information: No value filed.    Anesthesia Type:   General     Note:    Oropharynx: endotracheal tube in place, oral gastic tube in place and ventilatory support  Level of Consciousness: iatrogenic sedation      Independent Airway: airway patency not satisfactory and stable  Dentition: dentition unchanged  Vital Signs Stable: post-procedure vital signs reviewed and stable  Report to RN Given: handoff report given  Patient transferred to: ICU  Comments: Patient connected to SpO2, EKG, and arterial blood pressure transport monitors and accompanied by CRNA, anesthesiologist, circulating RN, surgeon (attending) to ICU room. Patient ventilated by anesthesiologist with ambu via ETT with O2 at 15 liters per minute during transport.     On arrival to ICU, endotracheal tube position unchanged, equal, bilateral breath sounds auscultated in ICU room, patient placed on ICU ventilator by respiratory therapist, teeth and oral mucosa intact and unchanged at handoff of care. At anesthesia handoff of care, clinical monitors and alarms on and functioning, report on patient's clinical status given to ICU RN, ICU staff questions answered.  ICU Handoff: Call for PAUSE to initiate/utilize ICU HANDOFF, Identified Patient, Identified Responsible Provider, Reviewed the Pertinent Medical History, Discussed Surgical Course, Reviewed Intra-OP Anesthesia Management and Issues during Anesthesia, Set Expectations for Post Procedure  Period and Allowed Opportunity for Questions and Acknowledgement of Understanding      Vitals:  Vitals Value Taken Time   BP     Temp     Pulse 101 11/16/22 2128   Resp 10 11/16/22 2128   SpO2 98 % 11/16/22 2128   Vitals shown include unvalidated device data.    Electronically Signed By: Christine Marie Volp Hodgkins, CRNA, APRN CRNA  November 16, 2022  9:30 PM

## 2022-11-17 NOTE — PROGRESS NOTES
"Critical Care Services Progress Note:  I have evaluated all laboratory values and imaging studies from the past 24 hours.  We are consulted by Dr. Quinonez of the CV surgery service for management post-procedure.  CC:  s/p emergent cab4  HPI: Per Dr. Jeronimo's admission note: \"67 year old male with a history of active smoking, hypertension and hyperlipidemia who presented to [facility] on 11/15 with chest pain x 5 days. He was taken for coronary angiogram which noted multi-vessel CAD. CV Surgery was urgently consulted and an emergent CABG x 4 was performed.\"  Of note, he also has a 1.3 cm stone in his R renal pelvis with low grade obstruction.  Urology working toward stenting, possibly Friday.  Overnight he did well on pressors, balloon pump and ventilator.  The patient is intubated and sedated which precludes direct history taking.  PMH/PSH/meds/all/SH/FH reviewed and as noted below.  ROS:  Unable as pt is intubated post procedure.  Exam:  BP (!) 140/80   Pulse 95   Temp (!) 101.1  F (38.4  C) (Bladder)   Resp 8   Ht 1.727 m (5' 8\")   Wt 73.6 kg (162 lb 4.1 oz)   SpO2 98%   BMI 24.67 kg/m    GEN: no acute distress, intubated/sedated  HEENT: head ncat, sclera anicteric, OP patent, trachea midline, PERRL  PULM: unlabored synchronous with vent, clear anteriorly    CV/COR: RRR S1S2 no gallop,  No rub, no murmur.  Midline incision dressed, c/d/i.  ABD: soft nontender, hypoactive bowel sounds, no mass  EXT:   No Edema,   Warm x4  NEURO: sedated but no gross deficit apparent  SKIN: no obvious rash  LINES: clean, dry intact    Data  Labs (personally reviewed/interpreted): lytes ok.  Creat 0.80 stable.  hypoalbuminemia 2.5--monitor.  Mild hyperglycemia 120-140s.  abg ok 7.42/34/117.  Wbc downtrending 14, hgb 12.0 acute blood loss anemia appropriate to procedure, pltlts 163 stable.  EKG (personally reviewed/interpreted): sinus 103, nl axis, nl int, no acute ischemic changes  CXR (personally reviewed/interpreted): ETT in good " position, swan appears deep; balloon pump ~6cm from ao arch--unchanged from last night.  Lungs clear.    Assessment/plan:  67 year old male s/p cab4.    Neurology/Psychiatry:   1. Analgesia -- tylenol, prn narcotics  2.  Anxiety -- wean sedative drips to extubate.  No acute anxiety needs at this time.    Cardiovascular:   1. Shock, post-procedure, vasoplegic vs unspecified -  Most likely due to vasoplegia which would be expected after a procedure of this magnitude. Continue phenylephrine and epinephrine, continue balloon pump.  2.  S/p CAB -- ASA, statin when cleared by cv surg      Pulmonary/Ventilator Management:   1. Airway -- intubated post-op.  Weaning to extubate  2. No underlying pulmonary problems on medical history or cxr.     GI and Nutrition :   1. Diet when cleared by CV surgery   2. Acid suppression for PUD prophy    Renal/Fluids/Electrolytes:   1. Monitor UOP/creatinine post-op.  2. Replete electrolytes prn  3. IVF and albumin administration for volume prn per protocol  4.  Renal stone -- 1.3 cm in R renal pelvis with low grade obstruction.  Appreciate urology support.  Possible stenting Friday.    Infectious Disease:   1. Prophylactic abx post-op per CV surgery    Endocrine:   1. Monitor for stress induced hyperglycemia. ICU insulin protocol, goal sugar <180     Hematology/Oncology:   1. Hgb/pltlts stable post op.  pRBC for goal hgb 8.0 or as indicated by CV surgery     IV/Access:   1. Venous access - Central access from OR  2. Arterial access - remove A-line when off vasoactives and extubated    ICU Prophylaxis:   1. DVT: Hep Subq/mechanical  2. VAP: HOB 30 degrees, chlorhexidine rinse  3. Stress Ulcer: PPI  4. Restraints: Nonviolent soft two point restraints required and necessary for patient safety and continued cares and good effect as patient continues to pull at necessary lines, tubes despite education and distraction. Will readdress daily.   5. IV Access - central access required and necessary  for continued patient cares  6. Disposition - ICU  I spent a total of 45 minutes (excluding procedure time) personally providing and directing critical care services at the bedside and on the critical care unit for this patient.     Jose D Johnson     PMH:  Past Medical History:   Diagnosis Date     Mixed hyperlipidemia      Nicotine dependence      Snoring        PSH:  Past Surgical History:   Procedure Laterality Date     BYPASS GRAFT ARTERY CORONARY N/A 11/16/2022    Procedure: CORONARY ARTERY BYPASS GRAFT x 4 (LEFT INTERNAL MAMMARY ARTERY - LEFT ANTERIOR DESCENDING ARTERY; SAPHENOUS VEIN - OBTUSE MARGINAL ARTERY; SAPHENOUS VEIN - RIGHT POSTERIOR DESCENDING ARTERY; SAPHENOUS VEIN - DIAGONAL ARTERY) WITH ENDOSCOPIC SAPHENOUS VEIN HARVEST ON THE LEFT LOWER EXTREMITY, AND ON CARDIOPULMONARY PUMP OXYGENATOR  (INTRAOPERATIVE TRANSESOPHAGEAL ECHOCARDIOGRAM BY ANESTHE     ELBOW SURGERY  2002     FINGER SURGERY      thumb     LEG SURGERY      femur     STRABISMUS SURGERY         Meds:  Current Facility-Administered Medications   Medication     [START ON 11/19/2022] acetaminophen (TYLENOL) tablet 650 mg     acetaminophen (TYLENOL) tablet 975 mg     alum & mag hydroxide-simethicone (MAALOX) suspension 30 mL     aspirin (ASA) chewable tablet 162 mg     bisacodyl (DULCOLAX) suppository 10 mg     calcium carbonate (TUMS) chewable tablet 500 mg     calcium gluconate 1 g in 50 mL sodium chloride intermittent infusion (premix)     calcium gluconate 2 g in  mL intermittent infusion     calcium gluconate 3 g in sodium chloride 0.9 % 100 mL intermittent infusion     ceFAZolin (ANCEF) 1 g vial to attach to  ml bag for ADULT or 50 ml bag for PEDS     chlorhexidine (PERIDEX) 0.12 % solution 15 mL     dexmedetomidine (PRECEDEX) 400 mcg in 0.9% sodium chloride 100 mL     glucose gel 15-30 g    Or     dextrose 50 % injection 25-50 mL    Or     glucagon injection 1 mg     EPINEPHrine (ADRENALIN) 5 mg in  mL infusion      gabapentin (NEURONTIN) capsule 100 mg     heparin ANTICOAGULANT injection 5,000 Units     hydrALAZINE (APRESOLINE) injection 10 mg     HYDROmorphone (DILAUDID) injection 0.2 mg    Or     HYDROmorphone (DILAUDID) injection 0.4 mg     hydrOXYzine (ATARAX) tablet 10 mg     insulin 1 unit/mL in saline (NovoLIN, HumuLIN Regular) drip - ADULT IV Infusion     lactated ringers BOLUS 250 mL     lactated ringers BOLUS 250 mL     lactated ringers infusion     Lidocaine (LIDOCARE) 4 % Patch 1-2 patch     lidocaine (LMX4) cream     lidocaine 1 % 0.1-1 mL     lidocaine patch in PLACE     magnesium hydroxide (MILK OF MAGNESIA) suspension 30 mL     propofol (DIPRIVAN) infusion    And     propofol (DIPRIVAN) bolus from infusion pump 10 mg    And     Medication Instruction     methocarbamol (ROBAXIN) tablet 500 mg     mupirocin (BACTROBAN) 2 % ointment 0.5 g     naloxone (NARCAN) injection 0.2 mg    Or     naloxone (NARCAN) injection 0.4 mg    Or     naloxone (NARCAN) injection 0.2 mg    Or     naloxone (NARCAN) injection 0.4 mg     nitroGLYcerin 50 mg in D5W 250 mL (adult std) infusion CENTRAL     ondansetron (ZOFRAN ODT) ODT tab 4 mg    Or     ondansetron (ZOFRAN) injection 4 mg     oxyCODONE (ROXICODONE) tablet 5 mg    Or     oxyCODONE (ROXICODONE) tablet 10 mg     pantoprazole (PROTONIX) 2 mg/mL suspension 40 mg    Or     pantoprazole (PROTONIX) IV push injection 40 mg     phenylephrine (CARLITA-SYNEPHRINE) 50 mg in NaCl 0.9 % 250 mL infusion     polyethylene glycol (MIRALAX) Packet 17 g     prochlorperazine (COMPAZINE) injection 5 mg    Or     prochlorperazine (COMPAZINE) tablet 5 mg     Reason beta blocker order not selected     senna-docusate (SENOKOT-S/PERICOLACE) 8.6-50 MG per tablet 1 tablet     sodium chloride (PF) 0.9% PF flush 3 mL     sodium chloride (PF) 0.9% PF flush 3 mL     vasopressin 0.2 units/mL in NS (PITRESSIN) standard conc infusion       Allergies:  No Known Allergies    Social Hx:  Social History      Socioeconomic History     Marital status: Single     Spouse name: Not on file     Number of children: Not on file     Years of education: Not on file     Highest education level: Not on file   Occupational History     Not on file   Tobacco Use     Smoking status: Every Day     Packs/day: 0.50     Years: 45.00     Pack years: 22.50     Types: Cigarettes     Smokeless tobacco: Never   Substance and Sexual Activity     Alcohol use: Not on file     Comment: 2-4 beers per month.     Drug use: Never     Sexual activity: Not on file   Other Topics Concern     Not on file   Social History Narrative     Not on file     Social Determinants of Health     Financial Resource Strain: Not on file   Food Insecurity: Not on file   Transportation Needs: Not on file   Physical Activity: Not on file   Stress: Not on file   Social Connections: Not on file   Intimate Partner Violence: Not on file   Housing Stability: Not on file       Family Hx:  Family History   Problem Relation Age of Onset     CABG Mother 74     Diabetes Father      Coronary Artery Disease Father 55         at home from a presumed fatal myocardial infarction at age 55 years.     No Known Problems Sister      No Known Problems Sister      No Known Problems Sister      No Known Problems Brother      No Known Problems Brother      No Known Problems Brother      No Known Problems Brother      No Known Problems Brother      No Known Problems Brother      Cancer Brother      Arrhythmia No family hx of      Cardiomyopathy No family hx of

## 2022-11-17 NOTE — PLAN OF CARE
Shift: [id number]      Neuro: pt is alert and oriented, moves all extremities equally    CV: Heart rhythm is sinus, rate in the 90's-low 100's    Resp: Extubated @0805 to 2L, currently on room air     GI: Pt tolerating clears; hypoactive bowel sounds    : Jasso in place, adequate output     Skin: Sternal incision intact, right femoral site intact after FemStop removed     Mobility/Activity: Pt off bedrest @1500, up to the chair with an assist of 2     Pain: Oxycodone 5mg given for pain @1450    Family Updated: Pt's son at the bedside this AM, updated by RN and MD     Balloon pump removed @0953. Pt on flat bedrest til 1500.

## 2022-11-17 NOTE — H&P
Critical Care  Note      11/16/2022    Name: Rodrigo Lanier MRN#: [id number]   Age: 67 year old YOB: 1955     Hsptl Day# 0  ICU DAY #    MV DAY #             Problem List:   Principal Problem:    Benign essential hypertension  Active Problems:    NSTEMI (non-ST elevated myocardial infarction) (H)    Other specified diabetes mellitus with other specified complication, without long-term current use of insulin (H)           Summary/Hospital Course:   67 year old male with a history of active smoking, hypertension and hyperlipidemia who presented to [facility] on 11/15 with chest pain x 5 days. He was taken for coronary angiogram which noted multi-vessel CAD. CV Surgery was urgently consulted and an emergent CABG x 4 was performed.      Assessment and plan :     I have personally reviewed the daily labs, imaging studies, cultures and discussed the case with referring physician and consulting physicians.     My assessment and plan by system for this patient is as follows:    Neurology/Psychiatry:   1. ICU Pain/analgesia  Plan  Wean off sedation accordingly. Currently on propofol ggt    Cardiovascular:   1. Multivessel CAD s/p emergent CABG  2. Shock likely due to vasoplegia and anaesthesia  Plan   - CT surgery on board. Continue as ASA and statins    Pulmonary/Ventilator Management:   1. Intubated for surgery  2. Post CABG mediastinal/pleural space chest tube - bloody output    Plan  - Wean off vent and extubate according to pathway  - Vent management,  HOB 30 degrees, chlorhexidine rinse    GI and Nutrition :   1. No issues  Plan  -PPI prophylaxis    Renal/Fluids/Electrolytes:   1. 1.3 cm stone at the right renal pelvis with mild prominence, potential low grade obstruction  Plan  - monitor function and electrolytes as needed with replacement per ICU protocols. - generally avoid nephrotoxic agents such as NSAID, IV contrast unless specifically required  - adjust medications as needed for renal clearance  -  follow I/O's as appropriate  - Urology aware and will need further evaluation    Infectious Disease:   1. Leucocytosis -  likely reactive    Plan  -Trend wbc    Endocrine:   1. DM type 2 basd on elevated HbA1C  2. Stress induced hyperglycemia    Plan  - ICU insulin protocol, goal sugar <180    Hematology/Oncology:   1. Anemia, no signs, symptoms of active blood loss    Plan  - Transfuse for Hb < 7     IV/Access:   1. Venous access - [place] catheter  2. Arterial access - radial line      ICU Prophylaxis:   1. DVT: mechanical  2. VAP: HOB 30 degrees, chlorhexidine rinse  3. Stress Ulcer: PPI/H2 blocker  4. Restraints: Nonviolent soft two point restraints required and necessary for patient safety and continued cares and good effect as patient continues to pull at necessary lines, tubes despite education and distraction. Will readdress daily.   5. Wound care  - No issue  6. Feeding - NPO, nutrition consult  7. Family Update- Yes  8. Disposition - Continue ICU care    Mini Jeronimo MD  Pulmonary, Critical Care and Sleep Medicine  [facility]  Pager: [phone]          Key goals for next 24 hours:   1. Wean off pressors  2. Extubate in accordance to pathway  3. Vent management      Medical History:     Past Medical History:   Diagnosis Date     Mixed hyperlipidemia      Nicotine dependence      Snoring      Past Surgical History:   Procedure Laterality Date     ELBOW SURGERY  2002     FINGER SURGERY      thumb     LEG SURGERY      femur     STRABISMUS SURGERY       Social History     Socioeconomic History     Marital status: Single     Spouse name: Not on file     Number of children: Not on file     Years of education: Not on file     Highest education level: Not on file   Occupational History     Not on file   Tobacco Use     Smoking status: Every Day     Packs/day: 0.50     Years: 45.00     Pack years: 22.50     Types: Cigarettes     Smokeless tobacco: Never   Substance and Sexual Activity      Alcohol use: Not on file     Comment: 2-4 beers per month.     Drug use: Never     Sexual activity: Not on file   Other Topics Concern     Not on file   Social History Narrative     Not on file     Social Determinants of Health     Financial Resource Strain: Not on file   Food Insecurity: Not on file   Transportation Needs: Not on file   Physical Activity: Not on file   Stress: Not on file   Social Connections: Not on file   Intimate Partner Violence: Not on file   Housing Stability: Not on file      No Known Allergies           Key Medications:       [START ON 11/17/2022] aspirin  81 mg Oral Daily     carvedilol  6.25 mg Oral BID     insulin aspart  1-6 Units Subcutaneous Q4H     [START ON 11/17/2022] lisinopril  20 mg Oral Daily     nicotine  1 patch Transdermal Daily     nicotine   Transdermal Q8H     nitroGLYcerin  1 patch Transdermal Daily     nitroGLYcerin   Transdermal Q8H JJ     rosuvastatin  40 mg Oral Daily     sodium chloride (PF)  3 mL Intracatheter Q8H       - MEDICATION INSTRUCTIONS -       heparin Stopped (11/16/22 1521)     - MEDICATION INSTRUCTIONS -       nitroGLYcerin 50 mg in D5W 250 mL Stopped (11/16/22 1610)     - MEDICATION INSTRUCTIONS -          Home Meds  No current facility-administered medications on file prior to encounter.  No current outpatient medications on file prior to encounter.             Physical Examination:   Temp:  [97.5  F (36.4  C)-97.9  F (36.6  C)] 97.9  F (36.6  C)  Pulse:  [67-85] 81  Resp:  [11-27] 16  BP: (108-178)/() 140/80  SpO2:  [92 %-97 %] 95 %    Intake/Output Summary (Last 24 hours) at 11/16/2022 2054  Last data filed at 11/16/2022 2040  Gross per 24 hour   Intake 2742.65 ml   Output 625 ml   Net 2117.65 ml     Wt Readings from Last 4 Encounters:   11/16/22 72.6 kg (160 lb)   01/31/17 74.4 kg (164 lb)   10/16/15 75.3 kg (166 lb)     BP - Mean:  [] 88  Resp: 16    No lab results found in last 7 days.    GEN: Sedated  HEENT: head ncat, sclera  anicteric, OP patent, trachea midline   PULM: unlabored synchronous with vent, clear anteriorly    CV/COR: RRR S1S2 no gallop,  No rub, no murmur  ABD: soft nontender, hypoactive bowel sounds, no mass  EXT:  No edema   warm  NEURO: grossly intact  SKIN: no obvious rash  LINES: clean, dry intact         Data:   All data and imaging reviewed     ROUTINE ICU LABS (Last four results)  CMP  Recent Labs   Lab 11/16/22 2023 11/16/22  1751 11/16/22  0632 11/16/22  0015     --   --  136   POTASSIUM 4.2  --   --  3.7   CHLORIDE 113*  --   --  105   CO2  --   --   --  20   ANIONGAP  --   --   --  11   GLC  --  204*  --  230*   BUN  --   --   --  15   CR  --   --   --  0.68   GFRESTIMATED  --   --   --  >90   LINDA  --   --   --  8.8   PROTTOTAL  --   --  6.6*  --    ALBUMIN  --   --  3.3*  --    BILITOTAL  --   --  0.8  --    ALKPHOS  --   --  77  --    AST  --   --  32  --    ALT  --   --  30  --      CBC  Recent Labs   Lab 11/16/22 2023 11/16/22  0015   WBC 20.9* 12.8*   RBC 3.86* 5.65   HGB 11.7* 16.8   HCT 34.2* 48.0   MCV 89 85   MCH 30.3 29.7   MCHC 34.2 35.0   RDW 12.2 11.9   * 234     INRNo lab results found in last 7 days.  Arterial Blood GasNo lab results found in last 7 days.    All cultures:  No results for input(s): CULT in the last 168 hours.  Recent Results (from the past 24 hour(s))   CT Aortic Survey w Contrast    Narrative    EXAM: CT AORTIC SURVEY W CONTRAST  LOCATION: [facility]  DATE/TIME: 11/16/2022 1:36 AM    INDICATION: Chest pain into back, HTN.  COMPARISON: None.  TECHNIQUE: CT chest abdomen pelvis during arterial phase of injection of IV contrast. Multiplanar reformatted images are obtained. Dose reduction techniques were used.   CONTRAST: 80 mL Isovue 370    FINDINGS:   CT ANGIOGRAM CHEST, ABDOMEN, AND PELVIS: Aorta is negative for aneurysm, dissection, acute intimal hematoma, or significant stenosis. Major branch vessels arising off the aortic arch are patent  without significant stenosis as are the bilateral iliac   arteries. Negative for pulmonary embolism. No acute vascular findings.    LUNGS AND PLEURA: Very subtle, hazy ground-glass opacity in the lower lungs right greater than left of uncertain significance and could be seen with edema versus infiltrate. 1 cm ground-glass nodular density in the right lung anteriorly on series 6,   image 136. Benign calcified granuloma left lower lobe. No pleural effusions.    MEDIASTINUM/AXILLAE: No adenopathy. Normal heart size. No pericardial effusion. Question some relative left ventricular wall thickening. Esophagus is grossly negative. Axillary regions are unremarkable.    CORONARY ARTERY CALCIFICATION: Severe.    HEPATOBILIARY: Normal.    PANCREAS: Normal.    SPLEEN: Normal.    ADRENAL GLANDS: Normal.    KIDNEYS/BLADDER: Benign 4 cm cyst in the right kidney centrally with no follow-up needed. Small, benign cyst medially in the left kidney with no follow-up needed. No solid or enhancing lesions. 1.3 cm nonobstructing stone upper pole right kidney. 8 mm   nonobstructing stone lower pole right kidney. Within the right renal pelvis, there is a curvilinear stone measuring up to at least 1.3 cm in diameter. There is mild prominence in the right renal pelvis. Cannot exclude some mild to low-grade obstruction   as a result. No left ureteral stones or hydronephrosis. Bladder is unremarkable.    BOWEL: Normal.    LYMPH NODES: Normal.    PELVIC ORGANS: Prostatic enlargement.    MUSCULOSKELETAL: Mild hypertrophic changes thoracic spine.      Impression    IMPRESSION:  1.  Normal aorta. Negative for aneurysm or dissection. No acute vascular findings.    2.  Severe coronary artery calcification.    3.  Question some relative left ventricular wall thickening. Follow-up echocardiogram could be obtained if indicated.    4.  Subtle diffuse hazy ground-glass opacity in the lower lungs, right greater than left, nonspecific and could be seen with  some mild edema versus less likely infiltrate.    5.  There is a more focal 1 cm ground-glass nodular opacity in the right lung anteriorly. Based on Fleischner criteria guidelines, follow-up CT in 6-12 months suggested for reevaluation.    6. 1.3 cm curvilinear stone present in the right renal pelvis with some mild prominence of the right renal pelvis. Cannot exclude some mild to low-grade obstruction as a result of this stone. Given this appearance and location of the stone, recommend   urologic consultation for further management options. Additional nonobstructing stones within the right kidney.    7. Prostatic enlargement.   Echocardiogram Complete   Result Value    LVEF  50-55%    Narrative    [id number]  [id number]  [id number]  [id number]^SOCORRO^VIKKI^NELSON     [facility]  Echocardiography Laboratory  [address] [place] [address]     Name: VITO WARD  MRN: [id number]  : 1955  Study Date: 2022 10:50 AM  Age: 67 yrs  Gender: Male  Patient Location: [facility]  Reason For Study: Chest Tightness  Ordering Physician: VIKKI QUINTANILLA  Referring Physician: Virginia Winters  Performed By: Nelson Rodarte RDCS     BSA: 1.9 m2  Height: 68 in  Weight: 160 lb  HR: 71  BP: 140/80 mmHg  ______________________________________________________________________________  Procedure  Complete Portable Echo Adult. Optison (NDC #[id number]) given intravenously.  ______________________________________________________________________________  Interpretation Summary     The left ventricle is normal in size.  There is mild concentric left ventricular hypertrophy.  The visual ejection fraction is 50-55%.  There is moderate inferolateral wall hypokinesis.  There is moderate lateral wall hypokinesis.  There is mild to moderate inferior wall hypokinesis.  No significant valvular heart disease.  ______________________________________________________________________________  Left Ventricle  The left ventricle  is normal in size. There is mild concentric left  ventricular hypertrophy. The visual ejection fraction is 50-55%. Grade I or  early diastolic dysfunction. There is moderate inferolateral wall hypokinesis.  There is moderate lateral wall hypokinesis. There is mild to moderate inferior  wall hypokinesis.     Right Ventricle  The right ventricle is normal in size and function.     Atria  Normal left atrial size. Right atrial size is normal. There is no color  Doppler evidence of an atrial shunt.     Mitral Valve  The mitral valve leaflets are mildly thickened. There is mild (1+) mitral  regurgitation.     Tricuspid Valve  There is trace tricuspid regurgitation. IVC diameter and respiratory changes  fall into an intermediate range suggesting an RA pressure of 8 mmHg.     Aortic Valve  There is trivial trileaflet aortic sclerosis. No aortic regurgitation is  present.     Pulmonic Valve  There is trace pulmonic valvular regurgitation.     Vessels  Normal size aorta. Borderline aortic root dilatation.     Pericardium  There is no pericardial effusion.     Rhythm  Sinus rhythm was noted.  ______________________________________________________________________________  MMode/2D Measurements & Calculations  IVSd: 1.3 cm     LVIDd: 4.8 cm  LVIDs: 3.5 cm  LVPWd: 0.93 cm  FS: 28.6 %  LV mass(C)d: 195.2 grams  LV mass(C)dI: 105.0 grams/m2  Ao root diam: 3.7 cm  LA dimension: 3.8 cm  asc Aorta Diam: 3.4 cm  LA/Ao: 1.0  LA Volume (BP): 56.6 ml  LA Volume Index (BP): 30.4 ml/m2  RWT: 0.38     Doppler Measurements & Calculations  MV E max angela: 59.7 cm/sec  MV A max angela: 95.1 cm/sec  MV E/A: 0.63  MV dec slope: 345.8 cm/sec2  E/E' av.3  Lateral E/e': 12.2  Medial E/e': 12.5     ______________________________________________________________________________  Report approved by: Javier Conklin 2022 02:12 PM         Cardiac Catheterization    Narrative      Prox Cx to Mid Cx lesion is 100% stenosed.    Ost LAD to Prox LAD  lesion is 70% stenosed.    1st Mrg lesion is 90% stenosed.    3rd Diag lesion is 60% stenosed.    2nd Diag lesion is 80% stenosed.    Prox LAD to Mid LAD lesion is 30% stenosed.    Dist LAD lesion is 50% stenosed.    1st Diag lesion is 90% stenosed.     Three vessel coronary artery disease (100% mLcx occlusion, 90% proximal   OM1 stenosis, 70% pLAD, 90% proximal diagonal 1, 80% diagonal 2, 60%   diagonal 3 stenosis, and severely diseased small RCA). Likely patient has   left dominant system.    NSTEMI  Intraortic balloon pump insertion via RFA approach (sheath secured and   sutured). Refractory angina while on IABP.   Hemostasis of LRA with TR band           Billing: This patient is critically ill: Yes. Total critical care time today 60 min.

## 2022-11-17 NOTE — PROGRESS NOTES
[facility]  Cardiovascular and Thoracic Surgery Daily Note      Assessment and Plan  POD # 1 s/p Coronary artery bypass grafting x 4   -reversed saphenous vein graft to the right posterior descending coronary artery  -reversed saphenous vein graft to the obtuse marginal branch of the left circumflex coronary artery  -reversed saphenous vein graft to the second diagonal branch of the left anterior descending coronary artery  -pedicled left internal mammary artery to left anterior descending coronary artery).  2.  Endoscopic vein harvest of the greater saphenous vein from the left lower extremity. on 22 with Dr. Sebastian Quinonez    - CVS: Pre-op TTE with EF 50-55%, mild LVH, ASA, 0.03 epi, laith 0.3, BB when able, statin, sub q heparin    - Resp: Extubated within 24 hrs of surgery, sating on 4L. IS, pulmonary toilet    - Neuro: Neuro intact, sedated, weaning, pain controlled on current regimen    - Renal: adequate UO, up 1 kg above pre-op weight, has kidney stones, urology was following for before surgery, tentative plans for ureteral stent placement on 22, continue to closely follow UO/flank pain, urine closely   Recent Labs   Lab 22  0358 22  2149 22   CR 0.80 0.82 0.80       - GI: -BM, continue bowel regimen    - : mcdaniel, limited mobility, accurate Is & Os    - Endo: Insulin infusion transition to sliding scale insulin once extubated and taking PO   Hemoglobin A1C   Date Value Ref Range Status   2022 6.6 (H) 0.0 - 5.6 % Final     Comment:     Normal <5.7%   Prediabetes 5.7-6.4%    Diabetes 6.5% or higher     Note: Adopted from ADA consensus guidelines.        - FEN: Replace electrolytes as needed. ADAT after extubation    - ID: Temp (24hrs), Av  F (37.8  C), Min:97.3  F (36.3  C), Max:101.3  F (38.5  C), continue to closely monitor, culture if temp increases.  WBC wnl likely reactive from surgery. Periop abx completing. Trend CBC.   Recent Labs   Lab  11/17/22  0358 11/16/22 2149 11/16/22 2023   WBC 14.3* 21.4* 20.9*       - Heme: Acute blood loss anemia and thrombocytopenia due to surgery. Hgb and PLT wnl. Trend CBC, transfuse PRN.   Recent Labs   Lab 11/17/22  0358 11/16/22 2149 11/16/22 2023   HGB 12.0* 12.9* 11.7*    162 145*       - Proph: SCD, subcutaneous heparin, PPI    - Dispo: ICU      Interval History  Lying in bed, responds to voice, weaning to extubate, IABP removed at 1003, palpable pulses noted at 5 mins after. Have had to inc gtt's slightly with removal.       Medications    acetaminophen  975 mg Oral Q8H     aspirin  162 mg Oral or NG Tube Daily     ceFAZolin  1 g Intravenous Q8H     chlorhexidine  15 mL Mouth/Throat Q12H     gabapentin  100 mg Oral At Bedtime     heparin ANTICOAGULANT  5,000 Units Subcutaneous Q8H     lactated ringers  250 mL Intravenous Once     Lidocaine  1-2 patch Transdermal Q24H     lidocaine   Transdermal Q8H JJ     mupirocin  0.5 g Both Nostrils BID     pantoprazole  40 mg Per Feeding Tube QAM AC    Or     pantoprazole  40 mg Intravenous QAM AC     polyethylene glycol  17 g Oral Daily     senna-docusate  1 tablet Oral BID     sodium chloride (PF)  3 mL Intracatheter Q8H     [START ON 11/19/2022] acetaminophen, alum & mag hydroxide-simethicone, bisacodyl, calcium carbonate, calcium gluconate, calcium gluconate, calcium gluconate, glucose **OR** dextrose **OR** glucagon, EPINEPHrine, hydrALAZINE, HYDROmorphone **OR** HYDROmorphone, hydrOXYzine, lactated ringers, lidocaine 4%, lidocaine (buffered or not buffered), magnesium hydroxide, propofol **AND** propofol **AND** CK total **AND** Triglycerides **AND** - MEDICATION INSTRUCTIONS - **AND** Notify Physician, methocarbamol, naloxone **OR** naloxone **OR** naloxone **OR** naloxone, nitroGLYcerin, ondansetron **OR** ondansetron, oxyCODONE **OR** oxyCODONE, phenylephrine, prochlorperazine **OR** prochlorperazine, BETA BLOCKER NOT PRESCRIBED, sodium chloride (PF),  "vasopressin      Physical Exam  Vitals were reviewed  Blood pressure (!) 140/80, pulse 96, temperature (!) 101.3  F (38.5  C), resp. rate 12, height 1.727 m (5' 8\"), weight 73.6 kg (162 lb 4.1 oz), SpO2 99 %.  Rhythm: NSR-S tach    Lungs: diminished bases    Cardiovascular: rrr, no m/r/g    Abdomen: soft, NT, ND, +BS    Extremeties: warm, no LE edema    Incision: CDI    CT: 540 serosang output, no air leak    Weight:   Vitals:    22 0020 22 0445   Weight: 72.6 kg (160 lb) 73.6 kg (162 lb 4.1 oz)         Data  Recent Labs   Lab 22  0744 22  0400 22  0358 22  2239 22  2149 22  2139 22   WBC  --   --  14.3*  --  21.4*  --  20.9*   HGB  --   --  12.0*  --  12.9*  --  11.7*   MCV  --   --  87  --  87  --  89   PLT  --   --  163  --  162  --  145*   INR  --   --   --   --  1.28*  --  1.52*   NA  --   --  142  --  144  --  141   POTASSIUM  --   --  3.9  --  4.0  --  4.2   CHLORIDE  --   --  115*  --  116*  --  113*   CO2  --   --  24  --  23  --  24   BUN  --   --  15  --  16  --  15   CR  --   --  0.80  --  0.82  --  0.80   ANIONGAP  --   --  3  --  5  --  4   LINDA  --   --  7.7*  --  7.9*  --  8.5   * 128* 146*   < > 130*   < > 160*   ALBUMIN  --   --  2.5*  --  2.6*  --   --    PROTTOTAL  --   --  4.9*  --  5.0*  --   --    BILITOTAL  --   --  0.4  --  0.4  --   --    ALKPHOS  --   --  49  --  56  --   --    ALT  --   --  49  --  32  --   --    AST  --   --  374*  --  157*  --   --     < > = values in this interval not displayed.       Imaging:  Recent Results (from the past 24 hour(s))   Echocardiogram Complete   Result Value    LVEF  50-55%    [facility]    [id number]  [id number]  [id number]  [id number]^QUINTANILLA^VIKKI^[facility]  Echocardiography Laboratory  [address] [place] [address]     Name: VITO WARD  MRN: [id number]  : 1955  Study Date: 2022 10:50 AM  Age: 67 yrs  Gender: Male  Patient Location: " [facility]  Reason For Study: Chest Tightness  Ordering Physician: VIKKI QUINTANILLA  Referring Physician: Virginia Winters  Performed By: Nelson Rodarte CATHIE     BSA: 1.9 m2  Height: 68 in  Weight: 160 lb  HR: 71  BP: 140/80 mmHg  ______________________________________________________________________________  Procedure  Complete Portable Echo Adult. Optison (NDC #[id number]) given intravenously.  ______________________________________________________________________________  Interpretation Summary     The left ventricle is normal in size.  There is mild concentric left ventricular hypertrophy.  The visual ejection fraction is 50-55%.  There is moderate inferolateral wall hypokinesis.  There is moderate lateral wall hypokinesis.  There is mild to moderate inferior wall hypokinesis.  No significant valvular heart disease.  ______________________________________________________________________________  Left Ventricle  The left ventricle is normal in size. There is mild concentric left  ventricular hypertrophy. The visual ejection fraction is 50-55%. Grade I or  early diastolic dysfunction. There is moderate inferolateral wall hypokinesis.  There is moderate lateral wall hypokinesis. There is mild to moderate inferior  wall hypokinesis.     Right Ventricle  The right ventricle is normal in size and function.     Atria  Normal left atrial size. Right atrial size is normal. There is no color  Doppler evidence of an atrial shunt.     Mitral Valve  The mitral valve leaflets are mildly thickened. There is mild (1+) mitral  regurgitation.     Tricuspid Valve  There is trace tricuspid regurgitation. IVC diameter and respiratory changes  fall into an intermediate range suggesting an RA pressure of 8 mmHg.     Aortic Valve  There is trivial trileaflet aortic sclerosis. No aortic regurgitation is  present.     Pulmonic Valve  There is trace pulmonic valvular regurgitation.     Vessels  Normal size aorta. Borderline aortic root  dilatation.     Pericardium  There is no pericardial effusion.     Rhythm  Sinus rhythm was noted.  ______________________________________________________________________________  MMode/2D Measurements & Calculations  IVSd: 1.3 cm     LVIDd: 4.8 cm  LVIDs: 3.5 cm  LVPWd: 0.93 cm  FS: 28.6 %  LV mass(C)d: 195.2 grams  LV mass(C)dI: 105.0 grams/m2  Ao root diam: 3.7 cm  LA dimension: 3.8 cm  asc Aorta Diam: 3.4 cm  LA/Ao: 1.0  LA Volume (BP): 56.6 ml  LA Volume Index (BP): 30.4 ml/m2  RWT: 0.38     Doppler Measurements & Calculations  MV E max angela: 59.7 cm/sec  MV A max angela: 95.1 cm/sec  MV E/A: 0.63  MV dec slope: 345.8 cm/sec2  E/E' av.3  Lateral E/e': 12.2  Medial E/e': 12.5     ______________________________________________________________________________  Report approved by: Javier Conklin 2022 02:12 PM         Cardiac Catheterization    Narrative      Prox Cx to Mid Cx lesion is 100% stenosed.    Ost LAD to Prox LAD lesion is 70% stenosed.    1st Mrg lesion is 90% stenosed.    3rd Diag lesion is 60% stenosed.    2nd Diag lesion is 80% stenosed.    Prox LAD to Mid LAD lesion is 30% stenosed.    Dist LAD lesion is 50% stenosed.    1st Diag lesion is 90% stenosed.     Three vessel coronary artery disease (100% mLcx occlusion, 90% proximal   OM1 stenosis, 70% pLAD, 90% proximal diagonal 1, 80% diagonal 2, 60%   diagonal 3 stenosis, and severely diseased small RCA). Likely patient has   left dominant system.    NSTEMI  Intraortic balloon pump insertion via RFA approach (sheath secured and   sutured). Refractory angina while on IABP.   Hemostasis of LRA with TR band     XR Chest Port 1 View    Narrative    EXAM: XR CHEST PORT 1 VIEW  LOCATION: [facility]  DATE/TIME: 2022 10:05 PM    INDICATION: Post Op CVTS Surgery  COMPARISON: CT from 2022      Impression    IMPRESSION: Status post median sternotomy. Endotracheal tube tip terminates about 3.5 cm above the neeta.  Enteric tube terminates below the diaphragm. Mediastinal drains and left chest tube in place. Right IJ approach pulmonary artery catheter tip   overlies the right lower lobe pulmonary artery. Mild prominence of the central interstitium. No visible pneumothorax or pleural effusion.   XR Abdomen Port 1 View    Narrative    EXAM: XR ABDOMEN PORT 1 VIEW  LOCATION: [facility]  DATE/TIME: 11/16/2022 11:50 PM    INDICATION: Verify OG tube.  COMPARISON: None.      Impression    IMPRESSION: Enteric tube with tip in the proximal body of the stomach. Sidehole is just below the GE junction. Nonspecific bowel gas pattern. Multiple additional lines and tubes noted over the lower chest.   XR Chest Port 1 View    Narrative    EXAM: XR CHEST PORT 1 VIEW  LOCATION: [facility]  DATE/TIME: 11/17/2022 5:10 AM    INDICATION: Post Op CVTS Surgery  COMPARISON: Radiograph 11/16/2022      Impression    IMPRESSION: Status post median sternotomy. Endotracheal tube tip terminates about 6 cm above the neeta, at the level of the mid-clavicular heads. Enteric tube courses below the diaphragm with side-port overlying the stomach and tip below the field of   view. Mediastinal drains and left chest tube in place. Right IJ approach pulmonary artery catheter tip overlies the right lower lobe pulmonary artery. Mild prominence of the central interstitium. Bibasilar subsegmental atelectasis. No visible   pneumothorax or pleural effusion. No acute osseous abnormality.         Patient seen and discussed with SARAH YuenC  Cardiothoracic Surgery  Pager [phone]

## 2022-11-17 NOTE — PROGRESS NOTES
[facility].  Inpatient Cardiology Progress Note.  Date of Service: 11/17/2022       INTERVAL HISTORY:  Yesterday, patient underwent emergent coronary angiogram which showed three vessel coronary artery disease (100% mLcx occlusion, 90% proximal OM1 stenosis, 70% pLAD, 90% proximal diagonal 1, 80% diagonal 2, 60% diagonal 3 stenosis, and severely diseased small RCA).  He had ongoing chest pain, was deemed clinically unstable, and underwent emergent coronary artery bypass grafting yesterday, by Dr. Paolo Quinonez with a LIMA to the LAD, vein graft to obtuse marginal, right posterior descending artery and diagonal arteries.    Successful surgery.  No intraoperative or postoperative complications.  This morning, he has been extubated, intra-aortic balloon pump removed.  He is awake and alert, needing minimal inotropic support, in sinus rhythm, creatinine is 0.8, electrolytes normal, hemoglobin 12.0.    On exam -regular heart sounds, awake and alert, midline sternotomy scar healing well, no audible murmur, no relaxer lower extremity edema, respiratory auscultation normal.    I personally reviewed labs, ECG image, echocardiogram images, coronary angiogram images, cardiac surgery operative report, as documented above.      DIAGNOSES:  1.  Postoperative day 1 status post CABG X 4 on 11/16/2022.  2.  New diagnosis of type 2 diabetes mellitus.  3.  New diagnosis of benign essential hypertension.  4.  Hyperlipidemia with goal LDL less than 70.      ASSESSMENT/PLAN:  Patient is recovering well after his emergent cardiac bypass surgery yesterday.  He is still needs ICU care, and has to be monitored posterior balloon pump removal and is on some inotropic support which can hopefully be weaned off over the next 24 hours.  He is rhythmically stable and renal function is normal.    1.  Discussed findings, long-term care with patient, his son Giuseppe who was present in the room, CV surgery team (Dr. Paolo Quinonez and Lorelei  Carlos Enrique MURPHY).  2.  I have started rosuvastatin 40 mg daily for very high LDL of 170.  3.  Once he is weaned off inotropes, will start metoprolol XL 25 mg daily.  4.  Cariology with follow.    30 minutes of critical care time spent in the care of this patient who is currently in the intensive care unit.  This includes management, liaison with ICU team and CV surgery team as documented above.    Marissa Salmeron MD, MD FACC  Cardiology.        VITAL SIGNS:  Temp: (!) 101.1  F (38.4  C) Temp src: Bladder BP: (!) 140/80 Pulse: 95   Resp: 8 SpO2: 98 % O2 Device: Oxymask Oxygen Delivery: 4 LPM  11/12 0700 - 11/17 0659  In: 5104 [P.O.:50; I.V.:4014]  Out: 2015 [Urine:1225]  Net: 3089  Vitals:    11/16/22 0020 11/17/22 0445   Weight: 72.6 kg (160 lb) 73.6 kg (162 lb 4.1 oz)

## 2022-11-17 NOTE — PROGRESS NOTES
Intensivist:  Discussed deep swan positioning with cv surgery.  They were agreeable to a 6 cm pullback.  McCracken withdrawn 6 cm with resultant PA waveform.

## 2022-11-18 ENCOUNTER — APPOINTMENT (OUTPATIENT)
Dept: OCCUPATIONAL THERAPY | Facility: CLINIC | Age: 67
DRG: 234 | End: 2022-11-18
Payer: MEDICARE

## 2022-11-18 ENCOUNTER — APPOINTMENT (OUTPATIENT)
Dept: GENERAL RADIOLOGY | Facility: CLINIC | Age: 67
DRG: 234 | End: 2022-11-18
Attending: PHYSICIAN ASSISTANT
Payer: MEDICARE

## 2022-11-18 DIAGNOSIS — Z95.1 S/P CABG (CORONARY ARTERY BYPASS GRAFT): Primary | ICD-10-CM

## 2022-11-18 LAB
ANION GAP SERPL CALCULATED.3IONS-SCNC: 4 MMOL/L (ref 3–14)
ATRIAL RATE - MUSE: 103 BPM
BASE EXCESS BLDV CALC-SCNC: 4.8 MMOL/L (ref -7.7–1.9)
BASOPHILS # BLD AUTO: 0 10E3/UL (ref 0–0.2)
BASOPHILS NFR BLD AUTO: 0 %
BUN SERPL-MCNC: 20 MG/DL (ref 7–30)
CA-I BLD-MCNC: 4.7 MG/DL (ref 4.4–5.2)
CALCIUM SERPL-MCNC: 8.1 MG/DL (ref 8.5–10.1)
CHLORIDE BLD-SCNC: 108 MMOL/L (ref 94–109)
CO2 SERPL-SCNC: 26 MMOL/L (ref 20–32)
CREAT SERPL-MCNC: 0.78 MG/DL (ref 0.66–1.25)
DIASTOLIC BLOOD PRESSURE - MUSE: NORMAL MMHG
EOSINOPHIL # BLD AUTO: 0 10E3/UL (ref 0–0.7)
EOSINOPHIL NFR BLD AUTO: 0 %
ERYTHROCYTE [DISTWIDTH] IN BLOOD BY AUTOMATED COUNT: 12.5 % (ref 10–15)
GFR SERPL CREATININE-BSD FRML MDRD: >90 ML/MIN/1.73M2
GLUCOSE BLD-MCNC: 179 MG/DL (ref 70–99)
GLUCOSE BLDC GLUCOMTR-MCNC: 134 MG/DL (ref 70–99)
GLUCOSE BLDC GLUCOMTR-MCNC: 150 MG/DL (ref 70–99)
GLUCOSE BLDC GLUCOMTR-MCNC: 90 MG/DL (ref 70–99)
HCO3 BLDV-SCNC: 30 MMOL/L (ref 21–28)
HCT VFR BLD AUTO: 29.3 % (ref 40–53)
HGB BLD-MCNC: 9.7 G/DL (ref 13.3–17.7)
IMM GRANULOCYTES # BLD: 0.1 10E3/UL
IMM GRANULOCYTES NFR BLD: 0 %
INTERPRETATION ECG - MUSE: NORMAL
LYMPHOCYTES # BLD AUTO: 2.1 10E3/UL (ref 0.8–5.3)
LYMPHOCYTES NFR BLD AUTO: 15 %
MAGNESIUM SERPL-MCNC: 2 MG/DL (ref 1.6–2.3)
MCH RBC QN AUTO: 29.8 PG (ref 26.5–33)
MCHC RBC AUTO-ENTMCNC: 33.1 G/DL (ref 31.5–36.5)
MCV RBC AUTO: 90 FL (ref 78–100)
MONOCYTES # BLD AUTO: 1.5 10E3/UL (ref 0–1.3)
MONOCYTES NFR BLD AUTO: 11 %
MRSA DNA SPEC QL NAA+PROBE: NEGATIVE
NEUTROPHILS # BLD AUTO: 10.4 10E3/UL (ref 1.6–8.3)
NEUTROPHILS NFR BLD AUTO: 74 %
NRBC # BLD AUTO: 0 10E3/UL
NRBC BLD AUTO-RTO: 0 /100
O2/TOTAL GAS SETTING VFR VENT: 21 %
OXYHGB MFR BLDV: 60 % (ref 70–75)
P AXIS - MUSE: 68 DEGREES
PCO2 BLDV: 43 MM HG (ref 40–50)
PH BLDV: 7.45 [PH] (ref 7.32–7.43)
PHOSPHATE SERPL-MCNC: 1.5 MG/DL (ref 2.5–4.5)
PHOSPHATE SERPL-MCNC: 1.9 MG/DL (ref 2.5–4.5)
PLATELET # BLD AUTO: 128 10E3/UL (ref 150–450)
PO2 BLDV: 30 MM HG (ref 25–47)
POTASSIUM BLD-SCNC: 3.8 MMOL/L (ref 3.4–5.3)
PR INTERVAL - MUSE: 134 MS
QRS DURATION - MUSE: 74 MS
QT - MUSE: 362 MS
QTC - MUSE: 474 MS
R AXIS - MUSE: 74 DEGREES
RBC # BLD AUTO: 3.25 10E6/UL (ref 4.4–5.9)
SA TARGET DNA: NEGATIVE
SODIUM SERPL-SCNC: 138 MMOL/L (ref 133–144)
SYSTOLIC BLOOD PRESSURE - MUSE: NORMAL MMHG
T AXIS - MUSE: 54 DEGREES
VENTRICULAR RATE- MUSE: 103 BPM
WBC # BLD AUTO: 14.1 10E3/UL (ref 4–11)

## 2022-11-18 PROCEDURE — C9113 INJ PANTOPRAZOLE SODIUM, VIA: HCPCS | Performed by: INTERNAL MEDICINE

## 2022-11-18 PROCEDURE — 36415 COLL VENOUS BLD VENIPUNCTURE: CPT | Performed by: PHYSICIAN ASSISTANT

## 2022-11-18 PROCEDURE — 250N000013 HC RX MED GY IP 250 OP 250 PS 637: Performed by: PHYSICIAN ASSISTANT

## 2022-11-18 PROCEDURE — 84100 ASSAY OF PHOSPHORUS: CPT | Performed by: PHYSICIAN ASSISTANT

## 2022-11-18 PROCEDURE — 250N000011 HC RX IP 250 OP 636: Performed by: PHYSICIAN ASSISTANT

## 2022-11-18 PROCEDURE — 85025 COMPLETE CBC W/AUTO DIFF WBC: CPT | Performed by: INTERNAL MEDICINE

## 2022-11-18 PROCEDURE — 97535 SELF CARE MNGMENT TRAINING: CPT | Mod: GO | Performed by: OCCUPATIONAL THERAPIST

## 2022-11-18 PROCEDURE — 250N000011 HC RX IP 250 OP 636: Performed by: STUDENT IN AN ORGANIZED HEALTH CARE EDUCATION/TRAINING PROGRAM

## 2022-11-18 PROCEDURE — 999N000065 XR CHEST 2 VIEWS

## 2022-11-18 PROCEDURE — 250N000009 HC RX 250: Performed by: PHYSICIAN ASSISTANT

## 2022-11-18 PROCEDURE — 80048 BASIC METABOLIC PNL TOTAL CA: CPT | Performed by: INTERNAL MEDICINE

## 2022-11-18 PROCEDURE — 82330 ASSAY OF CALCIUM: CPT | Performed by: PHYSICIAN ASSISTANT

## 2022-11-18 PROCEDURE — 83735 ASSAY OF MAGNESIUM: CPT | Performed by: INTERNAL MEDICINE

## 2022-11-18 PROCEDURE — 99232 SBSQ HOSP IP/OBS MODERATE 35: CPT | Performed by: INTERNAL MEDICINE

## 2022-11-18 PROCEDURE — 84100 ASSAY OF PHOSPHORUS: CPT | Performed by: INTERNAL MEDICINE

## 2022-11-18 PROCEDURE — 99233 SBSQ HOSP IP/OBS HIGH 50: CPT | Mod: 24 | Performed by: INTERNAL MEDICINE

## 2022-11-18 PROCEDURE — 120N000001 HC R&B MED SURG/OB

## 2022-11-18 PROCEDURE — 250N000013 HC RX MED GY IP 250 OP 250 PS 637: Performed by: INTERNAL MEDICINE

## 2022-11-18 PROCEDURE — 97530 THERAPEUTIC ACTIVITIES: CPT | Mod: GO | Performed by: OCCUPATIONAL THERAPIST

## 2022-11-18 PROCEDURE — 250N000011 HC RX IP 250 OP 636: Performed by: INTERNAL MEDICINE

## 2022-11-18 PROCEDURE — 97110 THERAPEUTIC EXERCISES: CPT | Mod: GO | Performed by: OCCUPATIONAL THERAPIST

## 2022-11-18 PROCEDURE — 82805 BLOOD GASES W/O2 SATURATION: CPT | Performed by: STUDENT IN AN ORGANIZED HEALTH CARE EDUCATION/TRAINING PROGRAM

## 2022-11-18 PROCEDURE — 258N000003 HC RX IP 258 OP 636: Performed by: PHYSICIAN ASSISTANT

## 2022-11-18 RX ORDER — METOPROLOL TARTRATE 25 MG/1
25 TABLET, FILM COATED ORAL 2 TIMES DAILY
Status: DISCONTINUED | OUTPATIENT
Start: 2022-11-18 | End: 2022-11-19

## 2022-11-18 RX ORDER — FUROSEMIDE 10 MG/ML
20 INJECTION INTRAMUSCULAR; INTRAVENOUS ONCE
Status: COMPLETED | OUTPATIENT
Start: 2022-11-18 | End: 2022-11-18

## 2022-11-18 RX ORDER — SODIUM CHLORIDE, SODIUM LACTATE, POTASSIUM CHLORIDE, CALCIUM CHLORIDE 600; 310; 30; 20 MG/100ML; MG/100ML; MG/100ML; MG/100ML
INJECTION, SOLUTION INTRAVENOUS CONTINUOUS
Status: DISCONTINUED | OUTPATIENT
Start: 2022-11-18 | End: 2022-11-20 | Stop reason: HOSPADM

## 2022-11-18 RX ORDER — LISINOPRIL 5 MG/1
5 TABLET ORAL DAILY
Status: DISCONTINUED | OUTPATIENT
Start: 2022-11-18 | End: 2022-11-20 | Stop reason: HOSPADM

## 2022-11-18 RX ADMIN — OXYCODONE HYDROCHLORIDE 5 MG: 5 TABLET ORAL at 05:21

## 2022-11-18 RX ADMIN — ROSUVASTATIN CALCIUM 40 MG: 20 TABLET, FILM COATED ORAL at 08:08

## 2022-11-18 RX ADMIN — POTASSIUM & SODIUM PHOSPHATES POWDER PACK 280-160-250 MG 2 PACKET: 280-160-250 PACK at 20:13

## 2022-11-18 RX ADMIN — GABAPENTIN 100 MG: 100 CAPSULE ORAL at 22:07

## 2022-11-18 RX ADMIN — ASPIRIN 81 MG CHEWABLE TABLET 162 MG: 81 TABLET CHEWABLE at 08:08

## 2022-11-18 RX ADMIN — SODIUM PHOSPHATE, MONOBASIC, MONOHYDRATE 15 MMOL: 276; 142 INJECTION, SOLUTION INTRAVENOUS at 08:26

## 2022-11-18 RX ADMIN — HEPARIN SODIUM 5000 UNITS: 5000 INJECTION, SOLUTION INTRAVENOUS; SUBCUTANEOUS at 15:01

## 2022-11-18 RX ADMIN — METOPROLOL TARTRATE 25 MG: 25 TABLET, FILM COATED ORAL at 08:08

## 2022-11-18 RX ADMIN — POTASSIUM & SODIUM PHOSPHATES POWDER PACK 280-160-250 MG 2 PACKET: 280-160-250 PACK at 18:29

## 2022-11-18 RX ADMIN — ACETAMINOPHEN 975 MG: 325 TABLET, FILM COATED ORAL at 08:08

## 2022-11-18 RX ADMIN — ACETAMINOPHEN 975 MG: 325 TABLET, FILM COATED ORAL at 16:08

## 2022-11-18 RX ADMIN — PANTOPRAZOLE SODIUM 40 MG: 40 INJECTION, POWDER, FOR SOLUTION INTRAVENOUS at 08:09

## 2022-11-18 RX ADMIN — METOPROLOL TARTRATE 25 MG: 25 TABLET, FILM COATED ORAL at 20:12

## 2022-11-18 RX ADMIN — LISINOPRIL 5 MG: 5 TABLET ORAL at 08:08

## 2022-11-18 RX ADMIN — FUROSEMIDE 20 MG: 10 INJECTION, SOLUTION INTRAMUSCULAR; INTRAVENOUS at 18:29

## 2022-11-18 RX ADMIN — POLYETHYLENE GLYCOL 3350 17 G: 17 POWDER, FOR SOLUTION ORAL at 08:08

## 2022-11-18 RX ADMIN — SENNOSIDES AND DOCUSATE SODIUM 1 TABLET: 50; 8.6 TABLET ORAL at 08:08

## 2022-11-18 RX ADMIN — HEPARIN SODIUM 5000 UNITS: 5000 INJECTION, SOLUTION INTRAVENOUS; SUBCUTANEOUS at 22:07

## 2022-11-18 RX ADMIN — HEPARIN SODIUM 5000 UNITS: 5000 INJECTION, SOLUTION INTRAVENOUS; SUBCUTANEOUS at 05:21

## 2022-11-18 ASSESSMENT — ACTIVITIES OF DAILY LIVING (ADL)
ADLS_ACUITY_SCORE: 29
ADLS_ACUITY_SCORE: 25
ADLS_ACUITY_SCORE: 29
ADLS_ACUITY_SCORE: 25
ADLS_ACUITY_SCORE: 29

## 2022-11-18 NOTE — CONSULTS
NUTRITION ASSESSMENT      REASON FOR ASSESSMENT:  Cardiac Surgery Nutrition Consult    DX:   NSTEMI, CAD   New T2DM diagnosis this admit   Kidney stones     PMH: active smoking, hypertension and hyperlipidemia     11/16: Angio = severe multivessel CAD   11/16: s/p emergent CABG x 4   Plan ureteral stent likely next week.    CURRENT DIET / INTAKE:  2 gm Na diet.  Pt consumed 75% breakfast this morning (omelet, whole milk).    Hgb A1C 6.6 (H) on 11/16/22  BGM: 165, 189, 90  Serum Glu 179 (H)  Pt on Medium Resistant sliding scale insulin.    He follows an unrestricted diet at home.    ANTHROPOMETRICS:   Ht: 5 ft 8 in  Admit Wt:  73.6 kg  BMI: 24.67  IBW: 70 kg  Weight Status: Normal BMI  %IBW: 105%    MALNUTRITION:  Patient does not meet two of the following criteria necessary for diagnosing malnutrition:  significant weight loss, reduced intake, subcutaneous fat loss, muscle loss or fluid retention. Nutrition Focused Physical Assessment (NFPA) not appropriate at this time.    NUTRITION DIAGNOSIS:   Inadequate oral intake R/t decreased appetite and early satiety s/p heart surgery AEB oral intake fair at best.    INTERVENTIONS:    Nutrition Prescription:  Add Moderate Consistent CHO (60 gm CHO per meal) to 2 gm Na diet  Add vanilla Glucerna at 2 pm    Implementation:  Nutrition Education (Content):  Discussed the importance of adequate nutrition post-op for healing and energy, emphasizing protein foods, and encouraged patient to order a protein food at each meal.    Goals:  Patient to consume ~75% at meals and supplements in the next 3 - 5 days    Follow Up/Monitoring (InPatient):  Food and Fluid intake -  Monitor for adequacy    Follow Up/Monitoring (OutPatient):  Patient will participate in out-patient cardiac rehab and attend nutrition classes during the program    Belia Almazan, RD, LD, CNSC

## 2022-11-18 NOTE — PROGRESS NOTES
[facility].  Inpatient Cardiology Progress Note.  Date of Service: 11/18/2022       INTERVAL HISTORY:  Transferred out of the ICU today.  Awake and alert.  Has expected sternal tenderness but otherwise hemodynamically and rhythmically stable.    On exam - sternal wound appears to be healing.  No discharge.  Regular heart sounds.  No murmur.  No rales or wheeze.    Labs -creatinine 0.78, normal electrolytes, hemoglobin 9.7.      DIAGNOSES:  1.  Postoperative day 2 status post CABG X 4 on 11/16/2022.  2.  New diagnosis of type 2 diabetes mellitus.  3.  New diagnosis of benign essential hypertension.  4.  Hyperlipidemia with goal LDL less than 70.      ASSESSMENT/PLAN:  1.  Cardiac medications are optimized.  2.  Outpatient follow-up arranged.  3.  Smoking cessation counseling.  4.  Discussed with CV surgery.  General cardiology will sign off.  Thank you for consulting us.    Total time today 25 minutes.    Marissa Salmeron MD, MD FACC  Cardiology.        VITAL SIGNS:  Temp: 98.4  F (36.9  C) Temp src: Oral BP: 112/68 Pulse: 107   Resp: 10 SpO2: 94 %      11/13 0700 - 11/18 0659  In: 5504.25 [P.O.:50; I.V.:4164.25]  Out: 3400 [Urine:2260]  Net: 2104.25  Vitals:    11/16/22 0020 11/17/22 0445   Weight: 72.6 kg (160 lb) 73.6 kg (162 lb 4.1 oz)

## 2022-11-18 NOTE — CONSULTS
Care Management Initial Consult    General Information  Assessment completed with: Patient, Friend,    Type of CM/SW Visit: Offer D/C Planning    Primary Care Provider verified and updated as needed: Yes   Readmission within the last 30 days:        Reason for Consult: discharge planning  Advance Care Planning:            Communication Assessment  Patient's communication style: spoken language (English or Bilingual)    Hearing Difficulty or Deaf: no   Wear Glasses or Blind: no    Cognitive  Cognitive/Neuro/Behavioral: WDL  Level of Consciousness: alert  Arousal Level: opens eyes spontaneously  Orientation: oriented x 4  Mood/Behavior: calm, cooperative  Best Language: 0 - No aphasia  Speech: clear, spontaneous, logical    Living Environment:   People in home: alone     Current living Arrangements:        Able to return to prior arrangements:         Family/Social Support:  Care provided by:    Provides care for:    Marital Status: Single             Description of Support System:           Current Resources:   Patient receiving home care services: No     Community Resources:    Equipment currently used at home: none  Supplies currently used at home:      Employment/Financial:  Employment Status: employed part-time     Employment/ Comments: does [employer]  Financial Concerns:             Lifestyle & Psychosocial Needs:  Social Determinants of Health     Tobacco Use: High Risk     Smoking Tobacco Use: Every Day     Smokeless Tobacco Use: Never     Passive Exposure: Not on file   Alcohol Use: Not on file   Financial Resource Strain: Not on file   Food Insecurity: Not on file   Transportation Needs: Not on file   Physical Activity: Not on file   Stress: Not on file   Social Connections: Not on file   Intimate Partner Violence: Not on file   Depression: Not on file   Housing Stability: Not on file       Functional Status:  Prior to admission patient needed assistance:              Mental Health Status:       "    Chemical Dependency Status:                Values/Beliefs:  Spiritual, Cultural Beliefs, Nondenominational Practices, Values that affect care:                 Additional Information:  Met with patient and family friend to discuss role in discharge planning.  Pt lives indep in apartment. 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  Pt has son and friends in the area that can assist as needed.  Per notes Plan was to discharge to friends house or with son for awhile.  Reviewed recommendation for OP CR.  Pt/friend in agreement with this and Pt has people that can drive him if needed.  Friend also discussed having him stay at his house and people \"coming to him\" if Pt was more comfortable with that.   Explained that CC would follow if needs changed.   Briefly explained HHC but that current recommendation/plan if OP CR.    CC to follow if needs arise or assistance needed with OP appointments.   Friends/Famliy can transport at discharge and are very supportive.     Addendum 11:10; Received call from Kat [facility] care coordinator  ( [phone]) who was wanting update on POC/D/C planning. Updated. She will follow patient in [facility] and is available if discharge needs arise.     Debbie Ortiz RN      "

## 2022-11-18 NOTE — PLAN OF CARE
[id number]    Pt back to bed from chair @ 2100 and went well. SBP>120 so Nitroglycerin drip started @ 0038 and ran as high as 70 but currently at 40. No complaints of chest pain but just some generalized soreness, so 5mg Oxy given @ 0600, Chest tube output 80mL, urine output 580mL.     Luca Morales RN on 11/18/2022 at 6:31 AM

## 2022-11-18 NOTE — PROGRESS NOTES
"Crit care consult:    S:  No complaints this morning.  Chest pain appropriate to procedure; controlled by analgesia.  Denies SOB.  No dizziness/lightheadedness.  Tolerating diet as expected.    O:  /71   Pulse 106   Temp 98.4  F (36.9  C) (Oral)   Resp 27   Ht 1.727 m (5' 8\")   Wt 73.6 kg (162 lb 4.1 oz)   SpO2 95%   BMI 24.67 kg/m    Gen:  No acute distress // HEENT:  PERRL, nose/ears grossly normal // Neck:  Supple // Lymph : no cervical adenopathy // chest : CTA-B, unlabored, chest wound c/d/i // cor:  rrr no m/r/g // abd s/nt/nd // extr:  wwp x4, no edema // neuro:  Awake, alert, rapid fluent appropriate speech, good str/sens x4 // skin:  No obvious rash    Labs (personally reviewed): lytes ok;  Creat 0.78; mild hypophos 1.5--replete; vbg ok 7.45/43/30.  Wbc 14.1 stable to improving.  hgb 9.7 some drop but expected with procedure.  pltls 128 -- thrombocytopenia but acceptable.    A/P:  67M pod 2 s/p cab4.  Overall doing well.  Cardiac:  CAD:  Continue asa/metoprolol/crestor  Pulm:  Breathing comfortably extubated.  Neuro: Analgesia:  Continue prn tylenol/opiates for post-op pain.  GI:  Advance diet as tolerated.  Endo:  Hyperglycemia:  Stress-related post-op as would be expected after a procedure of this magnitude.  Continue prn insulin per protocol.  Renal:  Large stone in renal pelvis:  Appreciate urology support.  Aiming for stenting on Monday, pending clinical status.  Prophylaxis:  Continue subcutaneous heparin.      Intensivist service will sign off.  Please re-consult as needed.    "

## 2022-11-18 NOTE — PLAN OF CARE
Orientations: A&Ox4  Vitals/Pain: VSS on Ra. Denies pain.   Tele: SR  Lines/Drains: CTx3 into 1 atrium. Output marked at 3pm.   Skin/Wounds: Sternal incision, CT sites, harvest sites LLE  GI/: Cardiac diet. Jasso removed at 12:30pm, due to void.   Labs: Abnormal/Trends, Electrolyte Replacement- none  Ambulation/Assist: SBA GB, walker  Sleep Quality: Poor  Plan: Worked with cardiac rehab this afternoon. Continue using IS.

## 2022-11-18 NOTE — PROGRESS NOTES
"UROLOGY PROGRESS NOTE    HPI/SUBJECTIVE:   Remains in CVICU. Denies flank pain. Curious about next steps.     UOP: 450/984  Creatinine: 0.78    ROS:     OBJECTIVE:          /71   Pulse 110   Temp 98.4  F (36.9  C) (Oral)   Resp 10   Ht 1.727 m (5' 8\")   Wt 73.6 kg (162 lb 4.1 oz)   SpO2 93%   BMI 24.67 kg/m      Intake/Output Summary (Last 24 hours) at 11/18/2022 0732  Last data filed at 11/18/2022 0700  Gross per 24 hour   Intake 414.7 ml   Output 1385 ml   Net -970.3 ml            General appearance shows no deformaties and good grooming.  EYES: no icterus  HEAD, EARS, NOSE, MOUTH, AND THROAT: atraumatic, normocephalic  NECK: symmetric  CHEST WALL: symmetric, large vertical incision to the anterior chest  CARDIAC: skin well perfused  RESPIRATORY: breathing unlabored  ABDOMEN: soft, non tender, non distended, no rebound or guarding  : Jasso draining clear yellow urine   SKIN/HAIR/NAILS: no visible rashes  NEUROLOGIC: no focal deficits  PSYCHIATRIC: Speech: normal Mood: normal Affect: normal    ASSESSMENT:   68 yo male with NSTEMI s/p CABG and need for post operative intra aortic balloon pump found to have a 1.3 cm stone at the right renal pelvis with mild prominence, potential low grade obstruction.     PLAN:    -Prioritize cardiac interventions at this time  -Recommend ureteral stent placement at some point prior to discharge, once stabilized from a cardiac standpoint; will require eventual procedure for definitive stone management in the future   -The patient is not having flank pain and kidney function is stable. He does not have signs of UTI. No emergent intervention needed today. Would favor medical optimization from a cardiac standpoint prior to any procedure.    -Should patient become symptomatic or renal function impacted, will need to reevaluate.       Mike Arias PA-C  [facility] ([facility])  [phone]  [employer] ([email or web address])   Text Page (7:30am " to 4:30pm)

## 2022-11-18 NOTE — PROGRESS NOTES
[facility]  Cardiovascular and Thoracic Surgery Daily Note      Assessment and Plan  POD # 2 s/p Coronary artery bypass grafting x 4   -reversed saphenous vein graft to the right posterior descending coronary artery  -reversed saphenous vein graft to the obtuse marginal branch of the left circumflex coronary artery  -reversed saphenous vein graft to the second diagonal branch of the left anterior descending coronary artery  -pedicled left internal mammary artery to left anterior descending coronary artery).  2.  Endoscopic vein harvest of the greater saphenous vein from the left lower extremity. on 22 with Dr. Sebastian Quinonez    - CVS: Pre-op TTE with EF 50-55%, mild LVH, ASA, on nitroglycerin, BB this am, start lisinopril, statin, sub q heparin, replace lytes per protocol    - Resp: Extubated within 24 hrs of surgery, sating on 4L. IS, pulmonary toilet    - Neuro: Neuro intact, sedated, weaning, pain controlled on current regimen    - Renal: adequate UO, up 1 kg above pre-op weight, has kidney stones, urology was following for before surgery, tentative plans for ureteral stent placement on 22, continue to closely follow UO/flank pain, urine closely   Recent Labs   Lab 22  0526 22  0358 22  2149   CR 0.78 0.80 0.82       - GI: -BM, continue bowel regimen    - : Remove Jasso today    - Endo: Insulin infusion transition to sliding scale insulin once extubated and taking PO   Hemoglobin A1C   Date Value Ref Range Status   2022 6.6 (H) 0.0 - 5.6 % Final     Comment:     Normal <5.7%   Prediabetes 5.7-6.4%    Diabetes 6.5% or higher     Note: Adopted from ADA consensus guidelines.        - FEN: Replace electrolytes as needed. ADAT      - ID: Temp (24hrs), Av.6  F (38.1  C), Min:98.1  F (36.7  C), Max:101.1  F (38.4  C), continue to closely monitor, culture if temp increases.  WBC wnl likely reactive from surgery. Periop abx completed. Trend CBC.   Recent Labs   Lab  11/18/22  0526 11/17/22  0358 11/16/22  2149   WBC 14.1* 14.3* 21.4*       - Heme: Acute blood loss anemia and thrombocytopenia due to surgery. Hgb and PLT wnl. Trend CBC, transfuse PRN.   Recent Labs   Lab 11/18/22  0526 11/17/22  0358 11/16/22  2149   HGB 9.7* 12.0* 12.9*   * 163 162       - Proph: SCD, subcutaneous heparin, PPI    - Dispo: ICU-->St [address]      Interval History  No overnight events, sitting up in bed, breathing stable, tolerating diet, working with rehab, pain controlled      Medications    acetaminophen  975 mg Oral Q8H     aspirin  162 mg Oral or NG Tube Daily     chlorhexidine  15 mL Mouth/Throat Q12H     gabapentin  100 mg Oral At Bedtime     heparin ANTICOAGULANT  5,000 Units Subcutaneous Q8H     insulin aspart  1-7 Units Subcutaneous TID AC     insulin aspart  1-5 Units Subcutaneous At Bedtime     Lidocaine  1-2 patch Transdermal Q24H     lidocaine   Transdermal Q8H JJ     lisinopril  5 mg Oral Daily     metoprolol tartrate  25 mg Oral BID     pantoprazole  40 mg Per Feeding Tube QAM AC    Or     pantoprazole  40 mg Intravenous QAM AC     polyethylene glycol  17 g Oral Daily     rosuvastatin  40 mg Oral Daily     senna-docusate  1 tablet Oral BID     sodium chloride (PF)  3 mL Intracatheter Q8H     [START ON 11/19/2022] acetaminophen, alum & mag hydroxide-simethicone, bisacodyl, calcium carbonate, calcium gluconate, calcium gluconate, calcium gluconate, glucose **OR** dextrose **OR** glucagon, EPINEPHrine, hydrALAZINE, HYDROmorphone **OR** HYDROmorphone, hydrOXYzine, lactated ringers, lidocaine 4%, lidocaine (buffered or not buffered), magnesium hydroxide, propofol **AND** propofol **AND** CK total **AND** Triglycerides **AND** - MEDICATION INSTRUCTIONS - **AND** Notify Physician, methocarbamol, naloxone **OR** naloxone **OR** naloxone **OR** naloxone, nitroGLYcerin, ondansetron **OR** ondansetron, oxyCODONE **OR** oxyCODONE, phenylephrine, prochlorperazine **OR** prochlorperazine, BETA  "BLOCKER NOT PRESCRIBED, sodium chloride (PF), vasopressin      Physical Exam  Vitals were reviewed  Blood pressure 119/71, pulse 110, temperature 98.4  F (36.9  C), temperature source Oral, resp. rate 10, height 1.727 m (5' 8\"), weight 73.6 kg (162 lb 4.1 oz), SpO2 93 %.  Rhythm: NSR-S tach    Lungs: diminished bases    Cardiovascular: rrr, no m/r/g    Abdomen: soft, NT, ND, +BS    Extremeties: warm, no LE edema    Incision: CDI    CT: 350<540 serosang output, no air leak    Weight:   Vitals:    11/16/22 0020 11/17/22 0445   Weight: 72.6 kg (160 lb) 73.6 kg (162 lb 4.1 oz)         Data  Recent Labs   Lab 11/18/22  0526 11/17/22  2125 11/17/22  1701 11/17/22  0400 11/17/22  0358 11/16/22  2239 11/16/22 2149 11/16/22 2026 11/16/22 2023   WBC 14.1*  --   --   --  14.3*  --  21.4*  --  20.9*   HGB 9.7*  --   --   --  12.0*  --  12.9*   < > 11.7*   MCV 90  --   --   --  87  --  87  --  89   *  --   --   --  163  --  162  --  145*   INR  --   --   --   --   --   --  1.28*  --  1.52*     --   --   --  142  --  144   < > 141   POTASSIUM 3.8  --   --   --  3.9  --  4.0   < > 4.2   CHLORIDE 108  --   --   --  115*  --  116*  --  113*   CO2 26  --   --   --  24  --  23  --  24   BUN 20  --   --   --  15  --  16  --  15   CR 0.78  --   --   --  0.80  --  0.82  --  0.80   ANIONGAP 4  --   --   --  3  --  5  --  4   LINDA 8.1*  --   --   --  7.7*  --  7.9*  --  8.5   * 189* 165*   < > 146*   < > 130*   < > 160*   ALBUMIN  --   --   --   --  2.5*  --  2.6*  --   --    PROTTOTAL  --   --   --   --  4.9*  --  5.0*  --   --    BILITOTAL  --   --   --   --  0.4  --  0.4  --   --    ALKPHOS  --   --   --   --  49  --  56  --   --    ALT  --   --   --   --  49  --  32  --   --    AST  --   --   --   --  374*  --  157*  --   --     < > = values in this interval not displayed.       Imaging:  No results found for this or any previous visit (from the past 24 hour(s)).      Patient seen and discussed with Dr." Cristiano Monaco PA-C  Cardiothoracic Surgery  Pager [phone]

## 2022-11-19 ENCOUNTER — APPOINTMENT (OUTPATIENT)
Dept: OCCUPATIONAL THERAPY | Facility: CLINIC | Age: 67
DRG: 234 | End: 2022-11-19
Payer: MEDICARE

## 2022-11-19 LAB
ANION GAP SERPL CALCULATED.3IONS-SCNC: 10 MMOL/L (ref 3–14)
BUN SERPL-MCNC: 27 MG/DL (ref 7–30)
CA-I BLD-MCNC: 4.5 MG/DL (ref 4.4–5.2)
CALCIUM SERPL-MCNC: 8.6 MG/DL (ref 8.5–10.1)
CHLORIDE BLD-SCNC: 105 MMOL/L (ref 94–109)
CO2 SERPL-SCNC: 23 MMOL/L (ref 20–32)
CREAT SERPL-MCNC: 0.71 MG/DL (ref 0.66–1.25)
ERYTHROCYTE [DISTWIDTH] IN BLOOD BY AUTOMATED COUNT: 12.2 % (ref 10–15)
GFR SERPL CREATININE-BSD FRML MDRD: >90 ML/MIN/1.73M2
GLUCOSE BLD-MCNC: 132 MG/DL (ref 70–99)
GLUCOSE BLDC GLUCOMTR-MCNC: 126 MG/DL (ref 70–99)
GLUCOSE BLDC GLUCOMTR-MCNC: 135 MG/DL (ref 70–99)
GLUCOSE BLDC GLUCOMTR-MCNC: 139 MG/DL (ref 70–99)
GLUCOSE BLDC GLUCOMTR-MCNC: 142 MG/DL (ref 70–99)
GLUCOSE BLDC GLUCOMTR-MCNC: 143 MG/DL (ref 70–99)
GLUCOSE BLDC GLUCOMTR-MCNC: 175 MG/DL (ref 70–99)
HCT VFR BLD AUTO: 26.8 % (ref 40–53)
HGB BLD-MCNC: 9.4 G/DL (ref 13.3–17.7)
MCH RBC QN AUTO: 30.5 PG (ref 26.5–33)
MCHC RBC AUTO-ENTMCNC: 35.1 G/DL (ref 31.5–36.5)
MCV RBC AUTO: 87 FL (ref 78–100)
PHOSPHATE SERPL-MCNC: 2.5 MG/DL (ref 2.5–4.5)
PLATELET # BLD AUTO: 115 10E3/UL (ref 150–450)
POTASSIUM BLD-SCNC: 3.3 MMOL/L (ref 3.4–5.3)
RBC # BLD AUTO: 3.08 10E6/UL (ref 4.4–5.9)
SODIUM SERPL-SCNC: 138 MMOL/L (ref 133–144)
WBC # BLD AUTO: 12.8 10E3/UL (ref 4–11)

## 2022-11-19 PROCEDURE — 250N000013 HC RX MED GY IP 250 OP 250 PS 637: Performed by: PHYSICIAN ASSISTANT

## 2022-11-19 PROCEDURE — 82330 ASSAY OF CALCIUM: CPT | Performed by: PHYSICIAN ASSISTANT

## 2022-11-19 PROCEDURE — 250N000011 HC RX IP 250 OP 636: Performed by: PHYSICIAN ASSISTANT

## 2022-11-19 PROCEDURE — 84100 ASSAY OF PHOSPHORUS: CPT | Performed by: PHYSICIAN ASSISTANT

## 2022-11-19 PROCEDURE — 80048 BASIC METABOLIC PNL TOTAL CA: CPT | Performed by: INTERNAL MEDICINE

## 2022-11-19 PROCEDURE — 97110 THERAPEUTIC EXERCISES: CPT | Mod: GO | Performed by: OCCUPATIONAL THERAPIST

## 2022-11-19 PROCEDURE — 250N000013 HC RX MED GY IP 250 OP 250 PS 637: Performed by: STUDENT IN AN ORGANIZED HEALTH CARE EDUCATION/TRAINING PROGRAM

## 2022-11-19 PROCEDURE — 85027 COMPLETE CBC AUTOMATED: CPT | Performed by: PHYSICIAN ASSISTANT

## 2022-11-19 PROCEDURE — 120N000001 HC R&B MED SURG/OB

## 2022-11-19 PROCEDURE — C9113 INJ PANTOPRAZOLE SODIUM, VIA: HCPCS | Performed by: PHYSICIAN ASSISTANT

## 2022-11-19 PROCEDURE — 36415 COLL VENOUS BLD VENIPUNCTURE: CPT | Performed by: INTERNAL MEDICINE

## 2022-11-19 RX ORDER — METOPROLOL TARTRATE 25 MG/1
25 TABLET, FILM COATED ORAL ONCE
Status: COMPLETED | OUTPATIENT
Start: 2022-11-19 | End: 2022-11-19

## 2022-11-19 RX ORDER — FUROSEMIDE 10 MG/ML
40 INJECTION INTRAMUSCULAR; INTRAVENOUS ONCE
Status: COMPLETED | OUTPATIENT
Start: 2022-11-19 | End: 2022-11-19

## 2022-11-19 RX ORDER — METOPROLOL TARTRATE 50 MG
50 TABLET ORAL 2 TIMES DAILY
Status: DISCONTINUED | OUTPATIENT
Start: 2022-11-19 | End: 2022-11-19

## 2022-11-19 RX ORDER — BISACODYL 10 MG
10 SUPPOSITORY, RECTAL RECTAL DAILY
Status: DISCONTINUED | OUTPATIENT
Start: 2022-11-19 | End: 2022-11-20 | Stop reason: HOSPADM

## 2022-11-19 RX ADMIN — HEPARIN SODIUM 5000 UNITS: 5000 INJECTION, SOLUTION INTRAVENOUS; SUBCUTANEOUS at 06:42

## 2022-11-19 RX ADMIN — POTASSIUM & SODIUM PHOSPHATES POWDER PACK 280-160-250 MG 1 PACKET: 280-160-250 PACK at 23:30

## 2022-11-19 RX ADMIN — ROSUVASTATIN CALCIUM 40 MG: 20 TABLET, FILM COATED ORAL at 10:19

## 2022-11-19 RX ADMIN — METOPROLOL TARTRATE 25 MG: 25 TABLET, FILM COATED ORAL at 10:19

## 2022-11-19 RX ADMIN — INSULIN ASPART 1 UNITS: 100 INJECTION, SOLUTION INTRAVENOUS; SUBCUTANEOUS at 10:23

## 2022-11-19 RX ADMIN — FUROSEMIDE 40 MG: 10 INJECTION, SOLUTION INTRAVENOUS at 13:02

## 2022-11-19 RX ADMIN — SENNOSIDES AND DOCUSATE SODIUM 1 TABLET: 50; 8.6 TABLET ORAL at 10:19

## 2022-11-19 RX ADMIN — POTASSIUM & SODIUM PHOSPHATES POWDER PACK 280-160-250 MG 1 PACKET: 280-160-250 PACK at 12:55

## 2022-11-19 RX ADMIN — HEPARIN SODIUM 5000 UNITS: 5000 INJECTION, SOLUTION INTRAVENOUS; SUBCUTANEOUS at 22:13

## 2022-11-19 RX ADMIN — METOPROLOL TARTRATE 25 MG: 25 TABLET, FILM COATED ORAL at 12:53

## 2022-11-19 RX ADMIN — HEPARIN SODIUM 5000 UNITS: 5000 INJECTION, SOLUTION INTRAVENOUS; SUBCUTANEOUS at 16:16

## 2022-11-19 RX ADMIN — POTASSIUM & SODIUM PHOSPHATES POWDER PACK 280-160-250 MG 1 PACKET: 280-160-250 PACK at 19:53

## 2022-11-19 RX ADMIN — LISINOPRIL 5 MG: 5 TABLET ORAL at 10:19

## 2022-11-19 RX ADMIN — MAGNESIUM HYDROXIDE 30 ML: 400 SUSPENSION ORAL at 12:53

## 2022-11-19 RX ADMIN — GABAPENTIN 100 MG: 100 CAPSULE ORAL at 22:12

## 2022-11-19 RX ADMIN — ACETAMINOPHEN 975 MG: 325 TABLET, FILM COATED ORAL at 16:13

## 2022-11-19 RX ADMIN — FUROSEMIDE 40 MG: 10 INJECTION, SOLUTION INTRAVENOUS at 16:16

## 2022-11-19 RX ADMIN — ACETAMINOPHEN 975 MG: 325 TABLET, FILM COATED ORAL at 10:19

## 2022-11-19 RX ADMIN — POTASSIUM & SODIUM PHOSPHATES POWDER PACK 280-160-250 MG 2 PACKET: 280-160-250 PACK at 01:33

## 2022-11-19 RX ADMIN — ASPIRIN 81 MG CHEWABLE TABLET 162 MG: 81 TABLET CHEWABLE at 10:19

## 2022-11-19 RX ADMIN — POTASSIUM & SODIUM PHOSPHATES POWDER PACK 280-160-250 MG 1 PACKET: 280-160-250 PACK at 10:19

## 2022-11-19 RX ADMIN — PANTOPRAZOLE SODIUM 40 MG: 40 INJECTION, POWDER, FOR SOLUTION INTRAVENOUS at 06:43

## 2022-11-19 RX ADMIN — ACETAMINOPHEN 975 MG: 325 TABLET, FILM COATED ORAL at 00:04

## 2022-11-19 RX ADMIN — POLYETHYLENE GLYCOL 3350 17 G: 17 POWDER, FOR SOLUTION ORAL at 10:19

## 2022-11-19 RX ADMIN — METOPROLOL TARTRATE 75 MG: 50 TABLET, FILM COATED ORAL at 22:12

## 2022-11-19 ASSESSMENT — ACTIVITIES OF DAILY LIVING (ADL)
ADLS_ACUITY_SCORE: 22
ADLS_ACUITY_SCORE: 24
ADLS_ACUITY_SCORE: 22
ADLS_ACUITY_SCORE: 24

## 2022-11-19 NOTE — PLAN OF CARE
Patient is A&Ox4. Vitals are stable on room air. Tele is sinus tachycardia. Abnormal/trending labs this shift were low Phosphorus levels, replacing with oral replacement. Abnormal assessment points included elevated heart rate, BP, and temperature. Patient has been urinating during shift after mcdaniel removal/lasix. Patient is up with standby. Sleep Quality is adequate. Plan is to continue monitoring phosphorus levels, encourage OOB, and incentive spirometer use.

## 2022-11-19 NOTE — PROGRESS NOTES
[facility]  Cardiovascular and Thoracic Surgery Daily Note      Assessment and Plan  POD # 3 s/p Coronary artery bypass grafting x 4   -reversed saphenous vein graft to the right posterior descending coronary artery  -reversed saphenous vein graft to the obtuse marginal branch of the left circumflex coronary artery  -reversed saphenous vein graft to the second diagonal branch of the left anterior descending coronary artery  -pedicled left internal mammary artery to left anterior descending coronary artery).  2.  Endoscopic vein harvest of the greater saphenous vein from the left lower extremity. on 22 with Dr. Sebastian Quinonez    - CVS: Pre-op TTE with EF 50-55%, mild LVH, ASA, BB inc 50 mg bid, lisinopril, statin, sub q heparin, replace lytes per protocol    - Resp: Extubated within 24 hrs of surgery, sating on RA<4L. IS, pulmonary toilet    - Neuro: Neuro intact, sedated, weaning, pain controlled on current regimen    - Renal: adequate UO, up 4 kg above pre-op weight, has kidney stones, urology was following for before surgery, tentative plans for ureteral stent placement on 22, continue to closely follow UO/flank pain, urine closely, lasix this am  Recent Labs   Lab 22  0525 22  0526 22  0358   CR 0.71 0.78 0.80       - GI: -BM, continue bowel regimen, needs BM, MOM vs suppository     - : voiding on own    - Endo: sliding scale insulin, new diabetic diagnosis   Hemoglobin A1C   Date Value Ref Range Status   2022 6.6 (H) 0.0 - 5.6 % Final     Comment:     Normal <5.7%   Prediabetes 5.7-6.4%    Diabetes 6.5% or higher     Note: Adopted from ADA consensus guidelines.        - FEN: Replace electrolytes as needed. ADAT      - ID: Temp (24hrs), Av.4  F (37.4  C), Min:98.5  F (36.9  C), Max:101.9  F (38.8  C), continue to closely monitor, culture if temp increases.  WBC wnl likely reactive from surgery. Periop abx completed. Trend CBC.   Recent Labs   Lab  11/19/22  0525 11/18/22  0526 11/17/22  0358   WBC 12.8* 14.1* 14.3*       - Heme: Acute blood loss anemia and thrombocytopenia due to surgery. Hgb and PLT wnl. Trend CBC, transfuse PRN.   Recent Labs   Lab 11/19/22  0525 11/18/22  0526 11/17/22  0358   HGB 9.4* 9.7* 12.0*   * 128* 163       - Proph: SCD, subcutaneous heparin, PPI    - Dispo: [address], home tomorrow      Interval History  Sitting up in bed, breathing stable, tolerating diet, working with therapies, shower today      Medications    acetaminophen  975 mg Oral Q8H     aspirin  162 mg Oral or NG Tube Daily     gabapentin  100 mg Oral At Bedtime     heparin ANTICOAGULANT  5,000 Units Subcutaneous Q8H     insulin aspart  1-7 Units Subcutaneous TID AC     insulin aspart  1-5 Units Subcutaneous At Bedtime     Lidocaine  1-2 patch Transdermal Q24H     lidocaine   Transdermal Q8H JJ     lisinopril  5 mg Oral Daily     metoprolol tartrate  25 mg Oral BID     pantoprazole  40 mg Per Feeding Tube QAM AC    Or     pantoprazole  40 mg Intravenous QAM AC     polyethylene glycol  17 g Oral Daily     potassium & sodium phosphates  1 packet Oral or Feeding Tube Q4H     rosuvastatin  40 mg Oral Daily     senna-docusate  1 tablet Oral BID     sodium chloride (PF)  3 mL Intracatheter Q8H     acetaminophen, alum & mag hydroxide-simethicone, bisacodyl, calcium carbonate, calcium gluconate, calcium gluconate, calcium gluconate, glucose **OR** dextrose **OR** glucagon, hydrALAZINE, HYDROmorphone **OR** HYDROmorphone, hydrOXYzine, lactated ringers, lidocaine 4%, lidocaine (buffered or not buffered), lidocaine (buffered or not buffered), magnesium hydroxide, methocarbamol, naloxone **OR** naloxone **OR** naloxone **OR** naloxone, ondansetron **OR** ondansetron, oxyCODONE **OR** oxyCODONE, prochlorperazine **OR** prochlorperazine, BETA BLOCKER NOT PRESCRIBED, sodium chloride (PF)      Physical Exam  Vitals were reviewed  Blood pressure (!) 145/84, pulse 101, temperature  "98.7  F (37.1  C), temperature source Oral, resp. rate 18, height 1.727 m (5' 8\"), weight 76.2 kg (168 lb), SpO2 92 %.  Rhythm: NSR     Lungs: diminished bases    Cardiovascular: rrr, no m/r/g    Abdomen: soft, NT, ND, +BS    Extremeties: warm, no LE edema    Incision: CDI    CT: na    Weight:   Vitals:    11/16/22 0020 11/17/22 0445 11/19/22 0527   Weight: 72.6 kg (160 lb) 73.6 kg (162 lb 4.1 oz) 76.2 kg (168 lb)         Data  Recent Labs   Lab 11/19/22  0841 11/19/22  0609 11/19/22  0525 11/18/22  0832 11/18/22  0526 11/17/22  0400 11/17/22  0358 11/16/22 2239 11/16/22 2149 11/16/22 2026 11/16/22 2023   WBC  --   --  12.8*  --  14.1*  --  14.3*  --  21.4*  --  20.9*   HGB  --   --  9.4*  --  9.7*  --  12.0*  --  12.9*   < > 11.7*   MCV  --   --  87  --  90  --  87  --  87  --  89   PLT  --   --  115*  --  128*  --  163  --  162  --  145*   INR  --   --   --   --   --   --   --   --  1.28*  --  1.52*   NA  --   --  138  --  138  --  142  --  144   < > 141   POTASSIUM  --   --  3.3*  --  3.8  --  3.9  --  4.0   < > 4.2   CHLORIDE  --   --  105  --  108  --  115*  --  116*  --  113*   CO2  --   --  23  --  26  --  24  --  23  --  24   BUN  --   --  27  --  20  --  15  --  16  --  15   CR  --   --  0.71  --  0.78  --  0.80  --  0.82  --  0.80   ANIONGAP  --   --  10  --  4  --  3  --  5  --  4   LINDA  --   --  8.6  --  8.1*  --  7.7*  --  7.9*  --  8.5   * 139* 132*   < > 179*   < > 146*   < > 130*   < > 160*   ALBUMIN  --   --   --   --   --   --  2.5*  --  2.6*  --   --    PROTTOTAL  --   --   --   --   --   --  4.9*  --  5.0*  --   --    BILITOTAL  --   --   --   --   --   --  0.4  --  0.4  --   --    ALKPHOS  --   --   --   --   --   --  49  --  56  --   --    ALT  --   --   --   --   --   --  49  --  32  --   --    AST  --   --   --   --   --   --  374*  --  157*  --   --     < > = values in this interval not displayed.       Imaging:  Recent Results (from the past 24 hour(s))   XR Chest 2 Views    " Narrative    EXAM: XR CHEST 2 VIEWS  LOCATION: [facility] [place]  DATE/TIME: 11/18/2022 5:04 PM    INDICATION: s p chest tube removal  COMPARISON: AP view of the chest 11/17/2022      Impression    IMPRESSION:     ET tube, right jugular sheath and PA catheter, enteric tube, and left basilar chest tube were discontinued.     No apical pleural line to indicate pneumothorax. Small bilateral pleural effusions are present, left slightly greater than right. Related atelectasis in the basal lungs.    Additional foci of angular atelectasis are present near both morelia.    Cardiac silhouette is not enlarged. Findings of median sternotomy and coronary revascularization are present. Minimal lower thoracic spine degenerative osteophytes.         Patient seen and discussed with Dr. Josephine Monaco PA-C  Cardiothoracic Surgery  Pager [phone]

## 2022-11-19 NOTE — PROGRESS NOTES
Pt A&Ox4. VSS on RA. Tylenol for pain. Incisions CDI. CT removed at 1430 this shift. CXray completed. Pt voided after mcdaniel removal. IV lasix given. Ambulated in [place].

## 2022-11-19 NOTE — PROGRESS NOTES
UROLOGY  SUBJECTIVE: 67 YOM S/P CABG X 4. INCIDENTAL FINDING RIGHT RENAL PELVIC CALCULUS. NO OBVIOUS OBSTRUCTION OR PAIN.  OBJECTIVE:   AWAKE, ALERT, NAD  SCR 0.72  IMPRESSION:  1. S/P CABG X 4 ON ANTICOAGULATION  2. NON OBSTRUCTING ASYMPTOMATIC RIGHT RENAL PELVIC CALCULUS WITH NORMAL RENAL FUNCTION  PLAN:  DISCUSSED OPTIONS.  NO INDICATION FOR URGENT OR EMERGENT STENT PLACEMENT.  ADVISED ELECTIVE URETEROSCOPY AND LASER LITHOTRIPSY OUTPATIENT WHEN CARDIOLOGY FEELS SAFEST  CAN DISCHARGE PER CARDS

## 2022-11-20 ENCOUNTER — APPOINTMENT (OUTPATIENT)
Dept: OCCUPATIONAL THERAPY | Facility: CLINIC | Age: 67
DRG: 234 | End: 2022-11-20
Payer: MEDICARE

## 2022-11-20 VITALS
DIASTOLIC BLOOD PRESSURE: 65 MMHG | HEART RATE: 117 BPM | TEMPERATURE: 97.8 F | OXYGEN SATURATION: 92 % | HEIGHT: 68 IN | SYSTOLIC BLOOD PRESSURE: 120 MMHG | WEIGHT: 164.2 LBS | BODY MASS INDEX: 24.89 KG/M2 | RESPIRATION RATE: 16 BRPM

## 2022-11-20 LAB
ANION GAP SERPL CALCULATED.3IONS-SCNC: 8 MMOL/L (ref 3–14)
BUN SERPL-MCNC: 27 MG/DL (ref 7–30)
CA-I BLD-MCNC: 4.5 MG/DL (ref 4.4–5.2)
CALCIUM SERPL-MCNC: 8.5 MG/DL (ref 8.5–10.1)
CHLORIDE BLD-SCNC: 103 MMOL/L (ref 94–109)
CO2 SERPL-SCNC: 26 MMOL/L (ref 20–32)
CREAT SERPL-MCNC: 0.73 MG/DL (ref 0.66–1.25)
ERYTHROCYTE [DISTWIDTH] IN BLOOD BY AUTOMATED COUNT: 12.3 % (ref 10–15)
GFR SERPL CREATININE-BSD FRML MDRD: >90 ML/MIN/1.73M2
GLUCOSE BLD-MCNC: 117 MG/DL (ref 70–99)
GLUCOSE BLDC GLUCOMTR-MCNC: 118 MG/DL (ref 70–99)
GLUCOSE BLDC GLUCOMTR-MCNC: 130 MG/DL (ref 70–99)
GLUCOSE BLDC GLUCOMTR-MCNC: 138 MG/DL (ref 70–99)
GLUCOSE BLDC GLUCOMTR-MCNC: 147 MG/DL (ref 70–99)
HCT VFR BLD AUTO: 28.9 % (ref 40–53)
HGB BLD-MCNC: 9.6 G/DL (ref 13.3–17.7)
MCH RBC QN AUTO: 29.6 PG (ref 26.5–33)
MCHC RBC AUTO-ENTMCNC: 33.2 G/DL (ref 31.5–36.5)
MCV RBC AUTO: 89 FL (ref 78–100)
PHOSPHATE SERPL-MCNC: 2.9 MG/DL (ref 2.5–4.5)
PLATELET # BLD AUTO: 172 10E3/UL (ref 150–450)
POTASSIUM BLD-SCNC: 3.5 MMOL/L (ref 3.4–5.3)
RBC # BLD AUTO: 3.24 10E6/UL (ref 4.4–5.9)
SODIUM SERPL-SCNC: 137 MMOL/L (ref 133–144)
WBC # BLD AUTO: 11.3 10E3/UL (ref 4–11)

## 2022-11-20 PROCEDURE — 82310 ASSAY OF CALCIUM: CPT | Performed by: PHYSICIAN ASSISTANT

## 2022-11-20 PROCEDURE — 36415 COLL VENOUS BLD VENIPUNCTURE: CPT | Performed by: PHYSICIAN ASSISTANT

## 2022-11-20 PROCEDURE — C9113 INJ PANTOPRAZOLE SODIUM, VIA: HCPCS | Performed by: PHYSICIAN ASSISTANT

## 2022-11-20 PROCEDURE — 250N000013 HC RX MED GY IP 250 OP 250 PS 637: Performed by: PHYSICIAN ASSISTANT

## 2022-11-20 PROCEDURE — 97110 THERAPEUTIC EXERCISES: CPT | Mod: GO | Performed by: OCCUPATIONAL THERAPIST

## 2022-11-20 PROCEDURE — 250N000011 HC RX IP 250 OP 636: Performed by: PHYSICIAN ASSISTANT

## 2022-11-20 PROCEDURE — 82330 ASSAY OF CALCIUM: CPT | Performed by: PHYSICIAN ASSISTANT

## 2022-11-20 PROCEDURE — 85027 COMPLETE CBC AUTOMATED: CPT | Performed by: PHYSICIAN ASSISTANT

## 2022-11-20 PROCEDURE — 84100 ASSAY OF PHOSPHORUS: CPT | Performed by: PHYSICIAN ASSISTANT

## 2022-11-20 RX ORDER — TIZANIDINE 2 MG/1
2 TABLET ORAL 3 TIMES DAILY PRN
Qty: 20 TABLET | Refills: 0 | Status: SHIPPED | OUTPATIENT
Start: 2022-11-20 | End: 2022-12-07

## 2022-11-20 RX ORDER — ASPIRIN 81 MG/1
162 TABLET, CHEWABLE ORAL DAILY
Qty: 30 TABLET | Refills: 0 | Status: SHIPPED | OUTPATIENT
Start: 2022-11-20 | End: 2023-05-12

## 2022-11-20 RX ORDER — METOPROLOL TARTRATE 100 MG
100 TABLET ORAL 2 TIMES DAILY
Qty: 60 TABLET | Refills: 3 | Status: SHIPPED | OUTPATIENT
Start: 2022-11-20 | End: 2023-04-04

## 2022-11-20 RX ORDER — OXYCODONE HYDROCHLORIDE 5 MG/1
5 TABLET ORAL EVERY 6 HOURS PRN
Qty: 15 TABLET | Refills: 0 | Status: SHIPPED | OUTPATIENT
Start: 2022-11-20 | End: 2022-12-07

## 2022-11-20 RX ORDER — AMOXICILLIN 250 MG
1 CAPSULE ORAL 2 TIMES DAILY PRN
Qty: 10 TABLET | Refills: 0 | Status: SHIPPED | OUTPATIENT
Start: 2022-11-20 | End: 2022-12-07

## 2022-11-20 RX ORDER — FUROSEMIDE 10 MG/ML
40 INJECTION INTRAMUSCULAR; INTRAVENOUS ONCE
Status: COMPLETED | OUTPATIENT
Start: 2022-11-20 | End: 2022-11-20

## 2022-11-20 RX ORDER — METOPROLOL TARTRATE 100 MG
100 TABLET ORAL 2 TIMES DAILY
Status: DISCONTINUED | OUTPATIENT
Start: 2022-11-20 | End: 2022-11-20 | Stop reason: HOSPADM

## 2022-11-20 RX ORDER — POLYETHYLENE GLYCOL 3350 17 G/17G
17 POWDER, FOR SOLUTION ORAL DAILY
Qty: 255 G | Refills: 0 | Status: SHIPPED | OUTPATIENT
Start: 2022-11-20 | End: 2022-12-07

## 2022-11-20 RX ORDER — LISINOPRIL 5 MG/1
5 TABLET ORAL DAILY
Qty: 30 TABLET | Refills: 3 | Status: SHIPPED | OUTPATIENT
Start: 2022-11-20 | End: 2023-03-07

## 2022-11-20 RX ORDER — FUROSEMIDE 40 MG
40 TABLET ORAL DAILY
Qty: 3 TABLET | Refills: 0 | Status: SHIPPED | OUTPATIENT
Start: 2022-11-20 | End: 2022-12-07

## 2022-11-20 RX ORDER — POTASSIUM CHLORIDE 1500 MG/1
20 TABLET, EXTENDED RELEASE ORAL DAILY
Qty: 3 TABLET | Refills: 0 | Status: SHIPPED | OUTPATIENT
Start: 2022-11-20 | End: 2022-11-23

## 2022-11-20 RX ORDER — FAMOTIDINE 20 MG/1
20 TABLET, FILM COATED ORAL 2 TIMES DAILY
Qty: 28 TABLET | Refills: 0 | Status: SHIPPED | OUTPATIENT
Start: 2022-11-20 | End: 2022-12-04

## 2022-11-20 RX ORDER — METHOCARBAMOL 500 MG/1
500 TABLET, FILM COATED ORAL EVERY 6 HOURS PRN
Qty: 30 TABLET | Refills: 0 | Status: SHIPPED | OUTPATIENT
Start: 2022-11-20 | End: 2022-11-20

## 2022-11-20 RX ORDER — ACETAMINOPHEN 325 MG/1
650 TABLET ORAL EVERY 4 HOURS PRN
Qty: 40 TABLET | Refills: 0 | Status: SHIPPED | OUTPATIENT
Start: 2022-11-20 | End: 2024-07-03

## 2022-11-20 RX ADMIN — ROSUVASTATIN CALCIUM 40 MG: 20 TABLET, FILM COATED ORAL at 10:05

## 2022-11-20 RX ADMIN — HEPARIN SODIUM 5000 UNITS: 5000 INJECTION, SOLUTION INTRAVENOUS; SUBCUTANEOUS at 14:41

## 2022-11-20 RX ADMIN — METFORMIN HYDROCHLORIDE 500 MG: 500 TABLET ORAL at 11:42

## 2022-11-20 RX ADMIN — ASPIRIN 81 MG CHEWABLE TABLET 162 MG: 81 TABLET CHEWABLE at 10:05

## 2022-11-20 RX ADMIN — HEPARIN SODIUM 5000 UNITS: 5000 INJECTION, SOLUTION INTRAVENOUS; SUBCUTANEOUS at 06:12

## 2022-11-20 RX ADMIN — METOPROLOL TARTRATE 100 MG: 100 TABLET, FILM COATED ORAL at 10:06

## 2022-11-20 RX ADMIN — SENNOSIDES AND DOCUSATE SODIUM 1 TABLET: 50; 8.6 TABLET ORAL at 10:06

## 2022-11-20 RX ADMIN — LISINOPRIL 5 MG: 5 TABLET ORAL at 10:05

## 2022-11-20 RX ADMIN — FUROSEMIDE 40 MG: 10 INJECTION, SOLUTION INTRAMUSCULAR; INTRAVENOUS at 11:45

## 2022-11-20 RX ADMIN — PANTOPRAZOLE SODIUM 40 MG: 40 INJECTION, POWDER, FOR SOLUTION INTRAVENOUS at 06:31

## 2022-11-20 ASSESSMENT — ACTIVITIES OF DAILY LIVING (ADL)
ADLS_ACUITY_SCORE: 22

## 2022-11-20 NOTE — PLAN OF CARE
Alert and oriented x4. Vital signs stable on RA.  Standby assist. Tolerating mod carb diet, good appetite. LS clear . Bowel sounds active, +flatus, Had a Bm this shift. Voids with urinal at bedside.   Skin : sternal incision, CT sites, graft sites @LLE. Denies any nausea or pain this shift. Tele: Sinus tachycardic.

## 2022-11-20 NOTE — DISCHARGE INSTRUCTIONS
AFTER YOU GO HOME FROM YOUR HEART SURGERY      You had a sternotomy, avoid lifting, pushing, or pulling anything greater than ten pounds for 8 weeks after surgery and then less than 20-30 pounds for an additional 6 weeks. Do not reach backwards or use arms to push out of chair. Do not let people pull on your arms to assist with standing. Avoid twisting or reaching too far across your body.  Avoid strenuous activities such as bowling, vacuuming, raking, shoveling, golf or tennis for 12 weeks after your surgery. Splint your chest incision by hugging a pillow or bringing your arms across your chest when coughing or sneezing. Please try to sleep on your back for the first 4-6 weeks to avoid extra stress on your sternum (breastbone) while it is healing.     No driving for 4 weeks after surgery or while on pain medication.      Shower or wash your incisions daily with antibacterial soap and water (or as instructed), pat dry. Keep wound clean and dry, showers are okay after discharge, but don't let spray hit directly on incision. No baths or swimming for 6 weeks and/or until incisions are completely healed over. Cover chest tube sites with gauze until they stop draining, then leave open to air. It is normal for chest tube sites to drain fluid for up to 2-3 weeks after surgery. It is not normal to have drainage from other incisions.   Watch for signs of infection: increased redness, tenderness, warmth or any drainage from sternum incision.  Also a temperature > 100.5 F or chills. Call your surgeon or primary care provider's office immediately.     Exercise is very important in your recovery. Please follow the guidelines set up for you in your cardiac rehab classes at the hospital. If outpatient cardiac rehab was ordered for you, we highly recommend you participate. If you have problems arranging your cardiac rehab, please call [phone] for all locations, with the exception of [place], please call [phone] and  [place], please call [phone].    Avoid sitting for prolonged periods of time, try to walk every hour during the day. If you have a leg incision, elevate your leg often when you are not walking.    Check your weight when you get home from the hospital and continue to check it daily through your recovery for at least a month. If you notice a weight gain of 2-3 pounds in a week, notify your primary care physician, cardiologist or surgeon.    Bowel activity may be slow after surgery. If necessary, you may take an over the counter laxative such as Milk of Magnesia or Miralax. You may have stool softeners prescribed (docusate sodium, Senokot). We recommend using stool softeners while using narcotics for pain (oxycodone/percocet, hydrocodone/vicodin, hydromorphone/dilaudid).      Wean OFF of narcotics (oxycodone, dilaudid, hydrocodone) as soon as possible. You should continue taking acetaminophen as long as you have any surgical pain as the first choice for pain control and add narcotics as necessary for pain to be tolerable.        REGARDING PRESCRIPTION REFILLS.  If you need a refill on your pain medication contact us to discuss your pain and a possible one time refill.   All other medications will be adjusted, discontinued and re-filled by your primary care physician and/or your cardiologist as they were prior to your surgery. We have given you enough for one to three month with possibly one refill. You may have refills available to you for some of your medications through the [facility]. If you would like your refills sent to a different a different pharmacy, please contact your preferred pharmacy and let them know that you have prescriptions at [facility] that you would like transferred.    POST-OPERATIVE CLINIC VISITS  You have a follow up visit with CV Surgery Advance Care Practitioners as scheduled at the Heart Clinic at [facility] in [place].  You will then return to the  care of your primary provider and your cardiologist. Future medication refills should come from your PCP or Cardiologist.   You should see your primary care provider in 2-4 weeks after discharge.   Follow up with your cardiologist as scheduled.   If you do not hear from a  in 7 days, please call [phone] and request to be seen with a general cardiologist or someone that you have seen in the past.     SURGICAL QUESTIONS  Please call our nurse coordinators, Neeta and Heydi, at [phone] with questions or concerns related to surgery. For other non-urgent questions and requests, out nurse coordinators can also be reached via email; Neeta at [email or web address] and Heydi at [email or web address]    On weekends or after hours, please call [phone] and ask the  to page the Cardiothoracic Surgeon on-call.      Thank you,    Your Cardiothoracic Surgery Team

## 2022-11-20 NOTE — PLAN OF CARE
Patient is A&Ox 4. Vitals are stable on room air. Tele is NSR. Abnormal/trending labs this shift were Phos at low end of range. Replaced 2x on shift, recheck in AM. Patient is up with standby. Ambulates to bathroom or uses urinal at bedside. No BM on shift, refused Senna due to BM's during day. Patient states mild generalized pain, refuses pain meds. Believes some pain is due to being in bed for so long. Sleep Quality is adequate. Plan is to prepare for discharge, likely Sunday.

## 2022-11-20 NOTE — DISCHARGE SUMMARY
"Discharge Summary    Rodrigo Lanier MRN# [id number]   YOB: 1955 Age: 67 year old     Date of Admission:  11/16/2022  Date of Discharge:  11/20/2022  Admitting Physician:  Noam Degroot MD  Discharge Physician:  Clari Quinonez MD  Discharging Service:  Cardiovascular and Thoracic Surgery     Home clinic: [facility]  Primary Provider: Virginia Winters          Admission Diagnoses:   Benign essential hypertension [I10]  NSTEMI (non-ST elevated myocardial infarction) (H) [I21.4]  Other specified diabetes mellitus with other specified complication, without long-term current use of insulin (H) [E13.69]          Discharge Diagnosis:   Patient Active Problem List   Diagnosis     Snoring     Hyperlipidemia     Nicotine dependence     Skin lesion     Benign essential hypertension     NSTEMI (non-ST elevated myocardial infarction) (H)     Other specified diabetes mellitus with other specified complication, without long-term current use of insulin (H)     S/P CABG (coronary artery bypass graft)     Fluid overload     Transient hyperglycemia post procedure                Discharge Disposition:   Discharged to home           Condition on Discharge:   Discharge condition: Stable   Discharge vitals: Blood pressure 128/84, pulse 94, temperature 97.8  F (36.6  C), temperature source Oral, resp. rate 16, height 1.727 m (5' 8\"), weight 74.5 kg (164 lb 3.2 oz), SpO2 92 %.     Code status on discharge: Full Code           Procedures:   On 11/16/22 Mr Lanier successfully underwent Coronary artery bypass grafting x 4, reversed saphenous vein graft to the right posterior descending coronary artery reversed saphenous vein graft to the obtuse marginal branch of the left circumflex coronary artery, reversed saphenous vein graft to the second diagonal branch of the left anterior descending coronary artery, pedicled left internal mammary artery to left anterior descending coronary artery). " Endoscopic vein harvest of the greater saphenous vein from the left lower extremity by Dr. Quinonez.           Medications Prior to Admission:     No medications prior to admission.             Discharge Medications:     Current Discharge Medication List      START taking these medications    Details   acetaminophen (TYLENOL) 325 MG tablet Take 2 tablets (650 mg) by mouth every 4 hours as needed for other or mild pain  Qty: 40 tablet, Refills: 0    Associated Diagnoses: S/P CABG (coronary artery bypass graft)      aspirin (ASA) 81 MG chewable tablet 2 tablets (162 mg) by Oral or NG Tube route daily  Qty: 30 tablet, Refills: 0    Associated Diagnoses: S/P CABG (coronary artery bypass graft)      famotidine (PEPCID) 20 MG tablet Take 1 tablet (20 mg) by mouth 2 times daily for 14 days  Qty: 28 tablet, Refills: 0    Associated Diagnoses: S/P CABG (coronary artery bypass graft)      lisinopril (ZESTRIL) 5 MG tablet Take 1 tablet (5 mg) by mouth daily  Qty: 30 tablet, Refills: 3    Associated Diagnoses: S/P CABG (coronary artery bypass graft)      metFORMIN (GLUCOPHAGE) 500 MG tablet Take 1 tablet (500 mg) by mouth 2 times daily (with meals)  Qty: 60 tablet, Refills: 3    Associated Diagnoses: S/P CABG (coronary artery bypass graft)      metoprolol tartrate (LOPRESSOR) 100 MG tablet Take 1 tablet (100 mg) by mouth 2 times daily  Qty: 60 tablet, Refills: 3    Associated Diagnoses: S/P CABG (coronary artery bypass graft)      oxyCODONE (ROXICODONE) 5 MG tablet Take 1 tablet (5 mg) by mouth every 6 hours as needed for moderate pain (4-6)  Qty: 15 tablet, Refills: 0    Associated Diagnoses: S/P CABG (coronary artery bypass graft)      polyethylene glycol (MIRALAX) 17 GM/Dose powder Take 17 g by mouth daily  Qty: 255 g, Refills: 0    Associated Diagnoses: S/P CABG (coronary artery bypass graft)      rosuvastatin (CRESTOR) 40 MG tablet Take 1 tablet (40 mg) by mouth daily  Qty: 90 tablet, Refills: 3    Associated Diagnoses:  Coronary artery disease involving native coronary artery of native heart with unstable angina pectoris (H)      senna-docusate (SENOKOT-S/PERICOLACE) 8.6-50 MG tablet Take 1 tablet by mouth 2 times daily as needed for constipation  Qty: 10 tablet, Refills: 0    Associated Diagnoses: S/P CABG (coronary artery bypass graft)      tiZANidine (ZANAFLEX) 2 MG tablet Take 1 tablet (2 mg) by mouth 3 times daily as needed for muscle spasms  Qty: 20 tablet, Refills: 0    Associated Diagnoses: S/P CABG (coronary artery bypass graft)                   Consultations:   Nutrition, Intensivist,              Brief History of Illness:   Rodrigo Lanier is a 67-year-old male who presented with an NSTEMI. Coronary angiography demonstrated severe multivessel coronary artery disease. Echocardiography demonstrated preserved left ventricular function, with inferolateral wall motion abnormalities and no significant valvular abnormality. During his angiogram, he continued to experience persistent chest pain that was unrelieved by placement of an intra-aortic balloon pump and initiation of both heparin and nitroglycerin infusions. Due to persistent symptoms, emergency CABG was recommended. The patient understood the risks and benefits of the procedure and wished to undergo the operation.          Hospital Course:   On 11/16/22 Mr Lanier successfully underwent Coronary artery bypass grafting x 4, reversed saphenous vein graft to the right posterior descending coronary artery reversed saphenous vein graft to the obtuse marginal branch of the left circumflex coronary artery, reversed saphenous vein graft to the second diagonal branch of the left anterior descending coronary artery, pedicled left internal mammary artery to left anterior descending coronary artery). Endoscopic vein harvest of the greater saphenous vein from the left lower extremity by Dr. Quinonez.  Was extubated within 24 hours of surgery.   Had some transient hyperglycemia treated with  an insulin gtt, transitioned to sliding scale insulin and was started on metformin at discharge. He will follow up with his PCP as he is a new diabetic.   He has known kidney stones, urology followed and recommended ureteral stent +/- lithotripsy as outpatient.   Had some fluid overload treated with diuretic medication. At discharge he is 2 kg above  pre-op weight and is not sent with diuretics and was given an extra dose of diuretics before discharge.    Heart rhythm remained stable. He progressed well with cardiac rehab. They are discharged to home on pod# 4 with the help of their family.              Significant Results:   Lab Results   Component Value Date    WBC 11.3 11/20/2022     Lab Results   Component Value Date    RBC 3.24 11/20/2022     Lab Results   Component Value Date    HGB 9.6 11/20/2022     Lab Results   Component Value Date    HCT 28.9 11/20/2022     No components found for: MCT  Lab Results   Component Value Date    MCV 89 11/20/2022     Lab Results   Component Value Date    MCH 29.6 11/20/2022     Lab Results   Component Value Date    MCHC 33.2 11/20/2022     Lab Results   Component Value Date    RDW 12.3 11/20/2022     Lab Results   Component Value Date     11/20/2022       Last Basic Metabolic Panel:  Lab Results   Component Value Date     11/20/2022      Lab Results   Component Value Date    POTASSIUM 3.5 11/20/2022     Lab Results   Component Value Date    CHLORIDE 103 11/20/2022     Lab Results   Component Value Date    LINDA 8.5 11/20/2022     Lab Results   Component Value Date    CO2 26 11/20/2022     Lab Results   Component Value Date    BUN 27 11/20/2022     Lab Results   Component Value Date    CR 0.73 11/20/2022     Lab Results   Component Value Date     11/20/2022     11/20/2022                  Pending Results:   None           Discharge Instructions and Follow-Up:   Discharge diet: Regular   Discharge activity: Daily weights: Call if weight gain 2-3 lbs over  24 hours or if greater than 5 lbs in 1 week.  No lifting more than 10 lbs for 8-12 weeks.  No driving for 1 month.  Call for pain medication refill.  Call for temperature greater than 101.0  Daily shower with antibacterial soap.   Discharge follow-up: Follow-up and recommended labs and tests     *Follow up primary care provider in 7-10 days after discharge in order to review your medication, vital signs, obtain any necessary lab work your doctor may want, and to update them on your hospitalization and medical issues.   *Follow up with Lorelei/Berta/Yanelis/Gia MIR-Gibson with Dr Quinonez, heart surgeon, at [facility] at [facility] [address] on 12/7/22 at 2:00 PM. If any questions or concerns call [phone].  You will see us once at this visit and then if everything is going well you will not need to see us again.  You will follow long term with your cardiologist.   *Follow up with Malik Merlos NP/PA, with cardiology, 2/17/22 at 10:55 am, Dr. Salmeron 5/3/22 at 11:40 am This is who you will follow with long term about your heart issues. [phone].           Outpatient therapy: Cardiac rehab   Home Care agency: None    Supplies and equipment: None   Lines and drains: None    Wound care: Wash incision daily with antibacterial soap   Other instructions: None

## 2022-11-21 ENCOUNTER — TELEPHONE (OUTPATIENT)
Dept: FAMILY MEDICINE | Facility: CLINIC | Age: 67
End: 2022-11-21

## 2022-11-21 PROBLEM — Z95.1 S/P CABG (CORONARY ARTERY BYPASS GRAFT): Status: ACTIVE | Noted: 2022-11-21

## 2022-11-21 PROBLEM — R73.9 TRANSIENT HYPERGLYCEMIA POST PROCEDURE: Status: ACTIVE | Noted: 2022-11-21

## 2022-11-21 PROBLEM — E87.70 FLUID OVERLOAD: Status: ACTIVE | Noted: 2022-11-21

## 2022-11-21 NOTE — TELEPHONE ENCOUNTER
Please contact patient.  Patient was just discharged from the hospital.  I am listed as his PCP, though I have not seen him since 2017.  First, I would find out if he is planning on coming here, or if he has another PCP elsewhere.  Second, Hospitalist recommended that he follow-up with PCP in approximately 1 week.  I see that he has an appointment with cardiac rehab on 12/1/2022.  As long as he is feeling okay, I do not think he necessarily needs to see PCP/somebody between now and 12/1/2022.  However, if he has any questions or concerns, we can see him for hospital follow-up.

## 2022-11-21 NOTE — PLAN OF CARE
Occupational Therapy Discharge Summary    Reason for therapy discharge:    Discharged to home with outpatient therapy.    Progress towards therapy goal(s). See goals on Care Plan in [facility] electronic health record for goal details.  Goals partially met.  Barriers to achieving goals:   discharge from facility.    Therapy recommendation(s):    Continued therapy is recommended.  Rationale/Recommendations:  below baseline function in self care/ADL and mobility and with new precautions. will benefit from OT and CR while IP to increase function and safety for home. anticipate will be able to complete basic ADL and mobility with mod I at discharge with assist for heavy IADL and OP CR for phase 2.

## 2022-11-21 NOTE — PLAN OF CARE
Alert and oriented x4. Vital signs stable on RA.  Walks independently. Tolerating mod carb diet,fair appetite. LS clear . Bowel sounds active, +flatus, Had a Bm this shift. Ambulates to the bathroom or sometimes uses urinal to void. Skin : sternal incision, CT sites, graft sites @LLE. Denies any nausea or pain this shift. Tele:  NSR    Heart video was given for patient to watch on Ipad.  All discharge instructions gone over. Pt verbalized understanding. All belonging and medication was sent with patient.

## 2022-11-22 ENCOUNTER — PATIENT OUTREACH (OUTPATIENT)
Dept: CARE COORDINATION | Facility: CLINIC | Age: 67
End: 2022-11-22

## 2022-11-22 ENCOUNTER — TELEPHONE (OUTPATIENT)
Dept: CARDIOLOGY | Facility: CLINIC | Age: 67
End: 2022-11-22

## 2022-11-22 NOTE — TELEPHONE ENCOUNTER
Called and spoke to patient. He has not established care any where. He has moved and would like to establish care with a midway provider. I gave patient the midway clinic number and let him know that he needs to schedule a appointment. Pt will call today to schedule

## 2022-11-22 NOTE — TELEPHONE ENCOUNTER
Cardiovascular Surgery  [facility]    DISCHARGE FOLLOW UP PHONE CALL      POST tylenolOP MONITORING  How is your pain on a 0-10 scale, how are you managing your pain? Little pain,taking tylenol only.    ACTIVITY  How is your activity tolerance? Not very active yesterday, Will go store  Are you using your incentive spirometer? Not using it consistently - instructed to use it every 2 hours.  Are you still doing sternal precautions? Yes  Do you hear any clicking when you are moving or taking a deep breath? No    Are you weighing yourself daily? Today was 156lbs  Are you monitoring your blood pressure and heart rate? No, recommended he buy a BP monitor.    SIGNS AND SYMPTOMS OF INFECTION  1. INCREASE IN PAIN - No  2. FEVER - 98.6F  3. DRAINAGE - No If so, color: No  4. REDNESS - No  5. SWELLING - No    ASSISTANCE  Do you have someone at home to assist you with your daily activities? Yes, his son.     MEDICATIONS  Is someone helping you to set up your medications? No  Do you have any questions about your medications? Following AVS instructions     FOLLOW UP  You are scheduled to see your primary care physician on Patient will schedule.  You are scheduled to see our surgery advanced practive provider for post operative follow up on 12/7 2:00.  You are scheduled for cardiac rehab on 12/1.  You are scheduled to see your cardiologist on 5/3, MURPHY on 2/17.    CONTACT INFORMATION  Please feel free to call us with any questions or symptoms that are concerning for you at [phone]. If it is after 4:00 in the afternoon, or a weekend please call [phone] and ask for the on call specialist. We want to do everything we can to help prevent you needing to return to the hospital, so please do not hesitate to call us.      RENETTA BROOKS, RN  Care Coordinator - [facility] CV Surgery   [facility]  [phone]

## 2022-11-22 NOTE — PROGRESS NOTES
[facility]    Background: Transitional Care Management program identified per system criteria and reviewed by [facility] team for possible outreach.    Assessment: Upon chart review, CCR Team member will not proceed with patient outreach related to this episode of Transitional Care Management program due to reason below:    Patient has active communication with a nurse, provider or care team for reason of post-hospital follow up plan.  Outreach call by CCRC team not indicated to minimize duplicative efforts.     Plan: Transitional Care Management episode addressed appropriately per reason noted above.            FIDEL Carrera  [facility]    *Connected Care Resource Team does NOT follow patient ongoing. Referrals are identified based on internal discharge reports and the outreach is to ensure patient has an understanding of their discharge instructions.

## 2022-11-29 ENCOUNTER — HOSPITAL ENCOUNTER (OUTPATIENT)
Dept: CARDIAC REHAB | Facility: CLINIC | Age: 67
Discharge: HOME | End: 2022-11-29
Attending: STUDENT IN AN ORGANIZED HEALTH CARE EDUCATION/TRAINING PROGRAM
Payer: MEDICARE

## 2022-11-29 DIAGNOSIS — Z95.1 S/P CABG (CORONARY ARTERY BYPASS GRAFT): ICD-10-CM

## 2022-11-29 PROCEDURE — 93798 PHYS/QHP OP CAR RHAB W/ECG: CPT

## 2022-11-29 PROCEDURE — 93797 PHYS/QHP OP CAR RHAB WO ECG: CPT | Mod: 59

## 2022-12-01 ENCOUNTER — HOSPITAL ENCOUNTER (OUTPATIENT)
Dept: CARDIAC REHAB | Facility: CLINIC | Age: 67
Discharge: HOME | End: 2022-12-01
Attending: INTERNAL MEDICINE
Payer: MEDICARE

## 2022-12-01 PROCEDURE — 93798 PHYS/QHP OP CAR RHAB W/ECG: CPT

## 2022-12-06 ENCOUNTER — HOSPITAL ENCOUNTER (OUTPATIENT)
Dept: CARDIAC REHAB | Facility: CLINIC | Age: 67
Discharge: HOME | End: 2022-12-06
Attending: INTERNAL MEDICINE
Payer: MEDICARE

## 2022-12-06 PROCEDURE — 93798 PHYS/QHP OP CAR RHAB W/ECG: CPT

## 2022-12-07 ENCOUNTER — OFFICE VISIT (OUTPATIENT)
Dept: CARDIOLOGY | Facility: CLINIC | Age: 67
End: 2022-12-07
Payer: MEDICARE

## 2022-12-07 VITALS
OXYGEN SATURATION: 98 % | HEART RATE: 82 BPM | HEIGHT: 68 IN | DIASTOLIC BLOOD PRESSURE: 82 MMHG | WEIGHT: 149 LBS | SYSTOLIC BLOOD PRESSURE: 126 MMHG | BODY MASS INDEX: 22.58 KG/M2

## 2022-12-07 DIAGNOSIS — Z95.1 S/P CABG (CORONARY ARTERY BYPASS GRAFT): Primary | ICD-10-CM

## 2022-12-07 PROCEDURE — 99024 POSTOP FOLLOW-UP VISIT: CPT | Performed by: NURSE PRACTITIONER

## 2022-12-07 RX ORDER — FUROSEMIDE 40 MG
40 TABLET ORAL DAILY
COMMUNITY
End: 2022-12-07

## 2022-12-07 NOTE — PATIENT INSTRUCTIONS
Your surgery was on November 16, 2022    Ok to start driving four weeks after the date of surgery as long as you are no longer taking narcotic pain medications.    From the date of surgery: no lifting greater than 10 pounds for 8 weeks, then no lifting greater than 20 pounds for an additional 4 weeks.    Do not soak your incisions until fully healed over with no scabbing; this includes hot tubs, baths, pools etc.    If you have questions or concerns, please call us:    Neeta Cano  [phone]

## 2022-12-07 NOTE — PROGRESS NOTES
"CARDIOTHORACIC SURGERY FOLLOW-UP VISIT     Rodrigo Lanier   1955   [id number]      Reason for visit: Post-Op CABGx4 with Dr. Quinonez on 11/16/2022    ASSESSMENT/PLAN:  Rodrigo Lanier is a 67 year old year old male status post CABG who returns to clinic for postop visit.     NSTEMI and severe multivessel CAD  S/P CABG x4  HTN/HLD  HFpEF, LVEF 50-55% and grade I diastolic dysfunction    Today in clinic patient sounds to be in a  regular rhythm with HR <100 bpm.   Discharge weight 164 lbs; weight today 149 lbs; Appears  euvolemic upon examinination with no appreciable JVD, BLE edema, abdominal bloating. LS are CTA.  Discussed the importance of nutrition in healing and staying adequately hydrated.   Midline sternal incision healing well with no noted erythema or drainage or signs of infection. Increasing activity and strength overall.      Hemodynamics are stable. Lasix was discontinued.    Follow up with your cardiologist as scheduled.    Continue Outpatient Cardiac Rehab until completed.     Continue sternal precautions for 12 weeks from surgery date.     No driving for 4 weeks from surgery date.     Postoperative restrictions have been reviewed and all of the patient's questions were answered. Our contact information was given to the patient if he should have any further questions/concerns. No further follow up is needed with us.  The total time spent with the patient was 45 minutes, > 50% of which was spent in counseling and coordination of care.    FREDDIE Nixon, [facility]-, CCRN  Nurse Practitioner  Cardiothoracic Surgery  Pager: [phone]    HPI: Per discharge summary:    \"Rodrigo Lanier is a 67-year-old male who presented with an NSTEMI. Coronary angiography demonstrated severe multivessel coronary artery disease. Echocardiography demonstrated preserved left ventricular function, with inferolateral wall motion abnormalities and no significant valvular abnormality. During his angiogram, he continued to " "experience persistent chest pain that was unrelieved by placement of an intra-aortic balloon pump and initiation of both heparin and nitroglycerin infusions. Due to persistent symptoms, emergency CABG was recommended. The patient understood the risks and benefits of the procedure and wished to undergo the operation.    On 11/16/22 Mr Lanier successfully underwent Coronary artery bypass grafting x 4, reversed saphenous vein graft to the right posterior descending coronary artery reversed saphenous vein graft to the obtuse marginal branch of the left circumflex coronary artery, reversed saphenous vein graft to the second diagonal branch of the left anterior descending coronary artery, pedicled left internal mammary artery to left anterior descending coronary artery). Endoscopic vein harvest of the greater saphenous vein from the left lower extremity by Dr. Quinonez.  Was extubated within 24 hours of surgery.   Had some transient hyperglycemia treated with an insulin gtt, transitioned to sliding scale insulin and was started on metformin at discharge. He will follow up with his PCP as he is a new diabetic.   He has known kidney stones, urology followed and recommended ureteral stent +/- lithotripsy as outpatient.   Had some fluid overload treated with diuretic medication. At discharge he is 2 kg above  pre-op weight and is not sent with diuretics and was given an extra dose of diuretics before discharge.    Heart rhythm remained stable. He progressed well with cardiac rehab. They are discharged to home on pod# 4 with the help of their family.\"    Returns to clinic today for postoperative visit.    Since discharge, has been recovering well at home with the help of  family.    States pain is. He continues to need no further medication for pain management. Sleep is good. Participating in therapy at cardiac rehab and states it is going well. Breathing has been stable/improving. Continues to work on C &DB, and working with IS. " Denies SOB at rest, PND, orthopnea. No cough. Patient denies any fever, chills, chest pain, palpitations, edema, SOB, lightheadedness, nausea and vomiting.  Has had a fairly good appetite. Having regular bowel movements and voiding without difficulty.  Denies sternal popping or clicking or incisional drainage/erythema. No psychiatric concerns.    PAST MEDICAL HISTORY:  Past Medical History:   Diagnosis Date     CAD (coronary artery disease)      HTN (hypertension)      Mixed hyperlipidemia      Nicotine dependence      Type 2 diabetes mellitus (H)        PAST SURGICAL HISTORY:  Past Surgical History:   Procedure Laterality Date     BYPASS GRAFT ARTERY CORONARY N/A 11/16/2022    Procedure: CORONARY ARTERY BYPASS GRAFT x 4 (LEFT INTERNAL MAMMARY ARTERY - LEFT ANTERIOR DESCENDING ARTERY; SAPHENOUS VEIN - OBTUSE MARGINAL ARTERY; SAPHENOUS VEIN - RIGHT POSTERIOR DESCENDING ARTERY; SAPHENOUS VEIN - DIAGONAL ARTERY) WITH ENDOSCOPIC SAPHENOUS VEIN HARVEST ON THE LEFT LOWER EXTREMITY, AND ON CARDIOPULMONARY PUMP OXYGENATOR  (INTRAOPERATIVE TRANSESOPHAGEAL ECHOCARDIOGRAM BY ANESTHE     CV CORONARY ANGIOGRAM N/A 11/16/2022    Procedure: Coronary Angiogram;  Surgeon: Truong Morales MD;  Location: [facility] CARDIAC CATH LAB     CV INTRA AORTIC BALLOON N/A 11/16/2022    Procedure: Intraprocedure Aortic Balloon Pump Insertion;  Surgeon: Truong Morales MD;  Location: [facility] CARDIAC CATH LAB     ELBOW SURGERY  2002     FINGER SURGERY      thumb     LEG SURGERY      femur     STRABISMUS SURGERY         CURRENT MEDICATIONS:   Current Outpatient Medications   Medication     acetaminophen (TYLENOL) 325 MG tablet     aspirin (ASA) 81 MG chewable tablet     furosemide (LASIX) 40 MG tablet     lisinopril (ZESTRIL) 5 MG tablet     metFORMIN (GLUCOPHAGE) 500 MG tablet     metoprolol tartrate (LOPRESSOR) 100 MG tablet     oxyCODONE (ROXICODONE) 5 MG tablet     polyethylene glycol (MIRALAX) 17 GM/Dose powder     rosuvastatin  "(CRESTOR) 40 MG tablet     senna-docusate (SENOKOT-S/PERICOLACE) 8.6-50 MG tablet     tiZANidine (ZANAFLEX) 2 MG tablet     No current facility-administered medications for this visit.       ALLERGIES:    No Known Allergies    ROS:  Review of symptoms otherwise negative unless commented about in HPI.     LABS:  Last Basic Metabolic Panel:  Lab Results   Component Value Date     11/20/2022      Lab Results   Component Value Date    POTASSIUM 3.5 11/20/2022     Lab Results   Component Value Date    CHLORIDE 103 11/20/2022     Lab Results   Component Value Date    LINDA 8.5 11/20/2022     Lab Results   Component Value Date    CO2 26 11/20/2022     Lab Results   Component Value Date    BUN 27 11/20/2022     Lab Results   Component Value Date    CR 0.73 11/20/2022     Lab Results   Component Value Date     11/20/2022     11/20/2022       Last CBC:   Lab Results   Component Value Date    WBC 11.3 11/20/2022     Lab Results   Component Value Date    RBC 3.24 11/20/2022     Lab Results   Component Value Date    HGB 9.6 11/20/2022     Lab Results   Component Value Date    HCT 28.9 11/20/2022     No components found for: MCT  Lab Results   Component Value Date    MCV 89 11/20/2022     Lab Results   Component Value Date    MCH 29.6 11/20/2022     Lab Results   Component Value Date    MCHC 33.2 11/20/2022     Lab Results   Component Value Date    RDW 12.3 11/20/2022     Lab Results   Component Value Date     11/20/2022       INR:  Lab Results   Component Value Date    INR 1.28 11/16/2022    INR 1.52 11/16/2022         IMAGING:  None    PHYSICAL EXAM:   Blood Pressure 126/82   Pulse 82   Height 1.727 m (5' 8\")   Weight 67.6 kg (149 lb)   Oxygen Saturation 98%   Body Mass Index 22.66 kg/m    General: alert and oriented x 3, pleasant, no acute distress, normal mood and affect  Neuro: Intact with no focal deficits   CV: S1 S2, no murmurs, rubs or gallops, regular rate and rhythm, no peripheral " edema  Pulm: bilateral breath sounds, clear to auscultation, easy work of breathing  Incision: incisions clean dry and intact without erythema, swelling or drainage    PROCEDURES: None         LUH Winters

## 2022-12-08 ENCOUNTER — HOSPITAL ENCOUNTER (OUTPATIENT)
Dept: CARDIAC REHAB | Facility: CLINIC | Age: 67
Discharge: HOME | End: 2022-12-08
Attending: INTERNAL MEDICINE
Payer: MEDICARE

## 2022-12-08 PROCEDURE — 93798 PHYS/QHP OP CAR RHAB W/ECG: CPT

## 2022-12-13 ENCOUNTER — HOSPITAL ENCOUNTER (OUTPATIENT)
Dept: CARDIAC REHAB | Facility: CLINIC | Age: 67
Discharge: HOME | End: 2022-12-13
Attending: INTERNAL MEDICINE
Payer: MEDICARE

## 2022-12-13 PROCEDURE — 93798 PHYS/QHP OP CAR RHAB W/ECG: CPT

## 2022-12-27 ENCOUNTER — HOSPITAL ENCOUNTER (OUTPATIENT)
Dept: CARDIAC REHAB | Facility: CLINIC | Age: 67
Discharge: HOME | End: 2022-12-27
Attending: INTERNAL MEDICINE
Payer: MEDICARE

## 2022-12-27 PROCEDURE — 93798 PHYS/QHP OP CAR RHAB W/ECG: CPT

## 2023-01-03 ENCOUNTER — HOSPITAL ENCOUNTER (OUTPATIENT)
Dept: CARDIAC REHAB | Facility: CLINIC | Age: 68
Discharge: HOME | End: 2023-01-03
Attending: INTERNAL MEDICINE
Payer: MEDICARE

## 2023-01-03 PROCEDURE — 93798 PHYS/QHP OP CAR RHAB W/ECG: CPT

## 2023-01-03 PROCEDURE — 93797 PHYS/QHP OP CAR RHAB WO ECG: CPT

## 2023-01-04 ENCOUNTER — OFFICE VISIT (OUTPATIENT)
Dept: INTERNAL MEDICINE | Facility: CLINIC | Age: 68
End: 2023-01-04
Payer: MEDICARE

## 2023-01-04 VITALS
OXYGEN SATURATION: 97 % | TEMPERATURE: 98.1 F | SYSTOLIC BLOOD PRESSURE: 108 MMHG | RESPIRATION RATE: 14 BRPM | BODY MASS INDEX: 21.98 KG/M2 | WEIGHT: 145 LBS | HEIGHT: 68 IN | DIASTOLIC BLOOD PRESSURE: 54 MMHG | HEART RATE: 59 BPM

## 2023-01-04 DIAGNOSIS — I10 BENIGN ESSENTIAL HYPERTENSION: ICD-10-CM

## 2023-01-04 DIAGNOSIS — Z12.11 SCREEN FOR COLON CANCER: ICD-10-CM

## 2023-01-04 DIAGNOSIS — E11.59 TYPE 2 DIABETES MELLITUS WITH OTHER CIRCULATORY COMPLICATION, WITHOUT LONG-TERM CURRENT USE OF INSULIN (H): ICD-10-CM

## 2023-01-04 DIAGNOSIS — I25.10 CORONARY ARTERY DISEASE INVOLVING NATIVE HEART WITHOUT ANGINA PECTORIS, UNSPECIFIED VESSEL OR LESION TYPE: Primary | ICD-10-CM

## 2023-01-04 DIAGNOSIS — E13.69 OTHER SPECIFIED DIABETES MELLITUS WITH OTHER SPECIFIED COMPLICATION, WITHOUT LONG-TERM CURRENT USE OF INSULIN (H): ICD-10-CM

## 2023-01-04 DIAGNOSIS — E78.2 MIXED HYPERLIPIDEMIA: ICD-10-CM

## 2023-01-04 PROBLEM — E87.70 FLUID OVERLOAD: Status: RESOLVED | Noted: 2022-11-21 | Resolved: 2023-01-04

## 2023-01-04 PROBLEM — R73.9 TRANSIENT HYPERGLYCEMIA POST PROCEDURE: Status: RESOLVED | Noted: 2022-11-21 | Resolved: 2023-01-04

## 2023-01-04 PROBLEM — I21.4 NSTEMI (NON-ST ELEVATED MYOCARDIAL INFARCTION) (H): Status: RESOLVED | Noted: 2022-11-16 | Resolved: 2023-01-04

## 2023-01-04 LAB
CREAT UR-MCNC: 261 MG/DL
MICROALBUMIN UR-MCNC: 481 MG/L
MICROALBUMIN/CREAT UR: 184.29 MG/G CR (ref 0–17)

## 2023-01-04 PROCEDURE — 91312 COVID-19 VACCINE BIVALENT BOOSTER 12+ (PFIZER): CPT | Performed by: INTERNAL MEDICINE

## 2023-01-04 PROCEDURE — 99204 OFFICE O/P NEW MOD 45 MIN: CPT | Mod: 25 | Performed by: INTERNAL MEDICINE

## 2023-01-04 PROCEDURE — 82043 UR ALBUMIN QUANTITATIVE: CPT | Performed by: INTERNAL MEDICINE

## 2023-01-04 PROCEDURE — G0008 ADMIN INFLUENZA VIRUS VAC: HCPCS | Performed by: INTERNAL MEDICINE

## 2023-01-04 PROCEDURE — 82570 ASSAY OF URINE CREATININE: CPT | Performed by: INTERNAL MEDICINE

## 2023-01-04 PROCEDURE — 90662 IIV NO PRSV INCREASED AG IM: CPT | Performed by: INTERNAL MEDICINE

## 2023-01-04 PROCEDURE — 0124A COVID-19 VACCINE BIVALENT BOOSTER 12+ (PFIZER): CPT | Performed by: INTERNAL MEDICINE

## 2023-01-04 NOTE — PROGRESS NOTES
Assessment & Plan     (I25.10) Coronary artery disease involving native heart without angina pectoris, unspecified vessel or lesion type  (primary encounter diagnosis)  Comment: NSTEMI  With emergent four vessel bypass in 11/2022.  Plan: doing well, asx, in cardiac rehab. On appropriate medication  Will continue plan with cards follow up.    (E13.69) Other specified diabetes mellitus with other specified complication, without long-term current use of insulin (H)  Comment: hx of, very mild--noted during recent hospitalization  Plan: Albumin Random Urine Quantitative with Creat         Ratio, Adult Eye  Referral        He has had some education, recent visit with dietician.  On 1gm of metformin, recheck a1c at his next visit. If numbers are improving, then likely can start a taper which he was happy to hear.  Eye exam and microalb needed, discussed. Referral placed.  See me in 3-4 months.    (Z12.11) Screen for colon cancer  Comment: due  Plan: COLOGUARD([employer])        Discussed, ordered.    (I10) Benign essential hypertension  Comment: controlled  Plan: Will plan to continue on previous medication without changes     (E78.2) Mixed hyperlipidemia  Comment: on statin  Plan: Will plan to continue on previous medication without changes     Will address additional HM items upon his return.           MED REC REQUIRED  Post Medication Reconciliation Status: discharge medications reconciled, continue medications without change      Return in about 4 months (around 5/4/2023) for Follow up, with me.    Armando Tompkins MD  [facility]   Rodrigo is a 67 year old, presenting for the following health issues:  [facility] F/U ([facility])      Recent hospitalization (2 months ago) for acute NSTEMI, sx persisting despite medication so CABG performed. 4 day stay, no complications.  Back home, in cardiac rehab, feeling well  Of note, mild elevation in  "hbga1c noted, 6.6%.  Here to establish care.        Review of Systems         Objective    /54   Pulse 59   Temp 98.1  F (36.7  C) (Tympanic)   Resp 14   Ht 1.715 m (5' 7.5\")   Wt 65.8 kg (145 lb)   SpO2 97%   BMI 22.38 kg/m    Body mass index is 22.38 kg/m .  Physical Exam   Gen:nad  Cv:[id number]  Lung:clear                    "

## 2023-01-05 ENCOUNTER — HOSPITAL ENCOUNTER (OUTPATIENT)
Dept: CARDIAC REHAB | Facility: CLINIC | Age: 68
Discharge: HOME | End: 2023-01-05
Attending: INTERNAL MEDICINE
Payer: MEDICARE

## 2023-01-05 DIAGNOSIS — Z12.11 SCREEN FOR COLON CANCER: ICD-10-CM

## 2023-01-05 PROCEDURE — 93798 PHYS/QHP OP CAR RHAB W/ECG: CPT

## 2023-01-10 ENCOUNTER — APPOINTMENT (OUTPATIENT)
Dept: CARDIAC REHAB | Facility: CLINIC | Age: 68
End: 2023-01-10
Attending: INTERNAL MEDICINE
Payer: MEDICARE

## 2023-01-10 ENCOUNTER — HOSPITAL ENCOUNTER (OUTPATIENT)
Dept: CARDIAC REHAB | Facility: CLINIC | Age: 68
Discharge: HOME | End: 2023-01-10
Attending: INTERNAL MEDICINE
Payer: MEDICARE

## 2023-01-10 PROCEDURE — 93798 PHYS/QHP OP CAR RHAB W/ECG: CPT

## 2023-01-10 PROCEDURE — 93797 PHYS/QHP OP CAR RHAB WO ECG: CPT

## 2023-01-12 ENCOUNTER — HOSPITAL ENCOUNTER (OUTPATIENT)
Dept: CARDIAC REHAB | Facility: CLINIC | Age: 68
Discharge: HOME | End: 2023-01-12
Attending: INTERNAL MEDICINE
Payer: MEDICARE

## 2023-01-12 PROCEDURE — 93798 PHYS/QHP OP CAR RHAB W/ECG: CPT

## 2023-01-16 ENCOUNTER — OFFICE VISIT (OUTPATIENT)
Dept: OPHTHALMOLOGY | Facility: CLINIC | Age: 68
End: 2023-01-16
Attending: INTERNAL MEDICINE
Payer: MEDICARE

## 2023-01-16 DIAGNOSIS — H25.13 NUCLEAR SCLEROTIC CATARACT OF BOTH EYES: ICD-10-CM

## 2023-01-16 DIAGNOSIS — E11.9 DIABETIC EYE EXAM (H): Primary | ICD-10-CM

## 2023-01-16 DIAGNOSIS — H53.032 STRABISMIC AMBLYOPIA OF LEFT EYE: ICD-10-CM

## 2023-01-16 DIAGNOSIS — H04.123 DRY EYES, BILATERAL: ICD-10-CM

## 2023-01-16 DIAGNOSIS — Z01.00 DIABETIC EYE EXAM (H): Primary | ICD-10-CM

## 2023-01-16 PROCEDURE — 92004 COMPRE OPH EXAM NEW PT 1/>: CPT | Performed by: STUDENT IN AN ORGANIZED HEALTH CARE EDUCATION/TRAINING PROGRAM

## 2023-01-16 PROCEDURE — G0463 HOSPITAL OUTPT CLINIC VISIT: HCPCS

## 2023-01-16 ASSESSMENT — CONF VISUAL FIELD
OS_SUPERIOR_NASAL_RESTRICTION: 0
OD_INFERIOR_NASAL_RESTRICTION: 0
OS_NORMAL: 1
OD_SUPERIOR_TEMPORAL_RESTRICTION: 0
OD_SUPERIOR_NASAL_RESTRICTION: 0
OD_INFERIOR_TEMPORAL_RESTRICTION: 0
OS_INFERIOR_TEMPORAL_RESTRICTION: 0
OS_SUPERIOR_TEMPORAL_RESTRICTION: 0
OD_NORMAL: 1
OS_INFERIOR_NASAL_RESTRICTION: 0
METHOD: COUNTING FINGERS

## 2023-01-16 ASSESSMENT — VISUAL ACUITY
OS_PH_SC+: +1
OD_SC: 20/25
OS_SC+: -1
OD_SC+: -2
METHOD_MR: PT DEFERRED
OS_PH_SC: 20/125
OS_SC: 20/125
METHOD: SNELLEN - LINEAR

## 2023-01-16 ASSESSMENT — TONOMETRY
IOP_METHOD: TONOPEN
OD_IOP_MMHG: 15
OS_IOP_MMHG: 16

## 2023-01-16 ASSESSMENT — CUP TO DISC RATIO
OS_RATIO: 0.1
OD_RATIO: 0.1

## 2023-01-16 ASSESSMENT — SLIT LAMP EXAM - LIDS
COMMENTS: WNL
COMMENTS: WNL

## 2023-01-16 ASSESSMENT — EXTERNAL EXAM - LEFT EYE: OS_EXAM: WNL

## 2023-01-16 ASSESSMENT — EXTERNAL EXAM - RIGHT EYE: OD_EXAM: WNL

## 2023-01-16 NOTE — PROGRESS NOTES
"HPI     Diabetic Eye Exam     Additional comments: Type 2 DM, DX 11/16/22           Comments    Pt here for a baseline DM dilated eye exam  Pt states no vision problem , has\" lazy eye , left eye\"  No flashes, floaters eye pain or redness  LBS:   Pt does not check    Last A1C: 6.5  Lab Results       Component                Value               Date                       A1C                      6.6                 11/16/2022                 A1C                      5.5                 10/16/2015        Rosalie Soriano COT 2:40 PM January 16, 2023                       Last edited by Rosalie Soriano on 1/16/2023  2:40 PM.          Review of systems for the eyes was negative other than the pertinent positives/negatives listed in the HPI.    Ocular Meds: none    Ocular Hx: amblyopia left eye s/p strab surgery (x2) in childhood    FOHx: sister - strabismus; mother - strabismus    PMHx: +Diabetes; CAB s/p 4vCABG (Nov 2022)    Assessment & Plan      Rodrigo Lanier is a 67 year old male with the following diagnoses:    1. Diabetic eye exam (H)    2. Strabismic amblyopia of left eye    3. Nuclear sclerotic cataract of both eyes    4. Dry eyes, bilateral         T2DM without Retinopathy OU  - Diagnosed: Nov 2022  - strict BP/BG control  - patient understands importance  of good blood glucose control in order to reduce the risk of developing diabetic retinopathy  - single DBH on exam, but no other signs of diabetes; or concerning findings; does note recent CAD s/p 4V CABG; will observe  - yearly DFE    Strabismic amblyopia of left eye  - patched inconsistently as a kid  - history of strab surgery  - vision at baseline  - monocular precautions advised including full time wear of polycarbonate lens glasses    NS Cataract OU  - not affecting ADLs  - observe    Dry Eyes OU  - PFATs QID and PRN OU    Counseled return/RD precautions    Patient disposition:   Return in about 1 year (around 1/16/2024) for Annual Visit, or sooner " changes.      Attending Physician Attestation:  Complete documentation of historical and exam elements from today's encounter can be found in the full encounter summary report (not reduplicated in this progress note).  I personally obtained the chief complaint(s) and history of present illness.  I confirmed and edited as necessary the review of systems, past medical/surgical history, family history, social history, and examination findings as documented by others; and I examined the patient myself.  I personally reviewed the relevant tests, images, and reports as documented above.  I formulated and edited as necessary the assessment and plan and discussed the findings and management plan with the patient and family. . - Kylie Becerra MD

## 2023-01-16 NOTE — NURSING NOTE
"Chief Complaints and History of Present Illnesses   Patient presents with     Diabetic Eye Exam     Type 2 DM, DX 11/16/22     Chief Complaint(s) and History of Present Illness(es)     Diabetic Eye Exam            Comments: Type 2 DM, DX 11/16/22          Comments    Pt here for a baseline DM dilated eye exam  Pt states no vision problem , has\" lazy eye , left eye\"  No flashes, floaters eye pain or redness  LBS:   Pt does not check    Last A1C: 6.5  Lab Results       Component                Value               Date                       A1C                      6.6                 11/16/2022                 A1C                      5.5                 10/16/2015        Rosalie Soriano COT 2:40 PM January 16, 2023                              "

## 2023-01-17 ENCOUNTER — HOSPITAL ENCOUNTER (OUTPATIENT)
Dept: CARDIAC REHAB | Facility: CLINIC | Age: 68
Discharge: HOME | End: 2023-01-17
Attending: INTERNAL MEDICINE
Payer: MEDICARE

## 2023-01-17 PROCEDURE — 93798 PHYS/QHP OP CAR RHAB W/ECG: CPT

## 2023-01-19 ENCOUNTER — HOSPITAL ENCOUNTER (OUTPATIENT)
Dept: CARDIAC REHAB | Facility: CLINIC | Age: 68
Discharge: HOME | End: 2023-01-19
Attending: INTERNAL MEDICINE
Payer: MEDICARE

## 2023-01-19 PROCEDURE — 93798 PHYS/QHP OP CAR RHAB W/ECG: CPT

## 2023-01-24 ENCOUNTER — HOSPITAL ENCOUNTER (OUTPATIENT)
Dept: CARDIAC REHAB | Facility: CLINIC | Age: 68
Discharge: HOME | End: 2023-01-24
Attending: INTERNAL MEDICINE
Payer: MEDICARE

## 2023-01-24 PROCEDURE — 93797 PHYS/QHP OP CAR RHAB WO ECG: CPT

## 2023-01-24 PROCEDURE — 93798 PHYS/QHP OP CAR RHAB W/ECG: CPT

## 2023-01-26 ENCOUNTER — HOSPITAL ENCOUNTER (OUTPATIENT)
Dept: CARDIAC REHAB | Facility: CLINIC | Age: 68
Discharge: HOME | End: 2023-01-26
Attending: INTERNAL MEDICINE
Payer: MEDICARE

## 2023-01-26 PROCEDURE — 93798 PHYS/QHP OP CAR RHAB W/ECG: CPT

## 2023-01-31 ENCOUNTER — HOSPITAL ENCOUNTER (OUTPATIENT)
Dept: CARDIAC REHAB | Facility: CLINIC | Age: 68
Discharge: HOME | End: 2023-01-31
Attending: INTERNAL MEDICINE
Payer: MEDICARE

## 2023-01-31 PROCEDURE — 93798 PHYS/QHP OP CAR RHAB W/ECG: CPT

## 2023-02-02 ENCOUNTER — HOSPITAL ENCOUNTER (OUTPATIENT)
Dept: CARDIAC REHAB | Facility: CLINIC | Age: 68
Discharge: HOME | End: 2023-02-02
Attending: INTERNAL MEDICINE
Payer: MEDICARE

## 2023-02-02 PROCEDURE — 93798 PHYS/QHP OP CAR RHAB W/ECG: CPT

## 2023-02-04 ENCOUNTER — HEALTH MAINTENANCE LETTER (OUTPATIENT)
Age: 68
End: 2023-02-04

## 2023-02-07 ENCOUNTER — HOSPITAL ENCOUNTER (OUTPATIENT)
Dept: CARDIAC REHAB | Facility: CLINIC | Age: 68
Discharge: HOME | End: 2023-02-07
Attending: INTERNAL MEDICINE
Payer: MEDICARE

## 2023-02-07 PROCEDURE — 93797 PHYS/QHP OP CAR RHAB WO ECG: CPT

## 2023-02-07 PROCEDURE — 93798 PHYS/QHP OP CAR RHAB W/ECG: CPT

## 2023-02-09 ENCOUNTER — HOSPITAL ENCOUNTER (OUTPATIENT)
Dept: CARDIAC REHAB | Facility: CLINIC | Age: 68
Discharge: HOME | End: 2023-02-09
Attending: INTERNAL MEDICINE
Payer: MEDICARE

## 2023-02-09 PROCEDURE — 93798 PHYS/QHP OP CAR RHAB W/ECG: CPT

## 2023-02-10 LAB — NONINV COLON CA DNA+OCC BLD SCRN STL QL: NEGATIVE

## 2023-02-14 ENCOUNTER — HOSPITAL ENCOUNTER (OUTPATIENT)
Dept: CARDIAC REHAB | Facility: CLINIC | Age: 68
Discharge: HOME | End: 2023-02-14
Attending: INTERNAL MEDICINE
Payer: MEDICARE

## 2023-02-14 PROCEDURE — 93797 PHYS/QHP OP CAR RHAB WO ECG: CPT

## 2023-02-14 PROCEDURE — 93798 PHYS/QHP OP CAR RHAB W/ECG: CPT

## 2023-02-16 ENCOUNTER — HOSPITAL ENCOUNTER (OUTPATIENT)
Dept: CARDIAC REHAB | Facility: CLINIC | Age: 68
Discharge: HOME | End: 2023-02-16
Attending: INTERNAL MEDICINE
Payer: MEDICARE

## 2023-02-16 PROCEDURE — 93798 PHYS/QHP OP CAR RHAB W/ECG: CPT

## 2023-02-17 ENCOUNTER — OFFICE VISIT (OUTPATIENT)
Dept: CARDIOLOGY | Facility: CLINIC | Age: 68
End: 2023-02-17
Payer: MEDICARE

## 2023-02-17 VITALS
HEART RATE: 54 BPM | DIASTOLIC BLOOD PRESSURE: 70 MMHG | SYSTOLIC BLOOD PRESSURE: 115 MMHG | HEIGHT: 68 IN | OXYGEN SATURATION: 96 % | BODY MASS INDEX: 22.49 KG/M2 | WEIGHT: 148.4 LBS

## 2023-02-17 DIAGNOSIS — E78.5 HYPERLIPIDEMIA LDL GOAL <70: ICD-10-CM

## 2023-02-17 DIAGNOSIS — I10 BENIGN ESSENTIAL HYPERTENSION: ICD-10-CM

## 2023-02-17 DIAGNOSIS — Z95.1 S/P CABG (CORONARY ARTERY BYPASS GRAFT): Primary | ICD-10-CM

## 2023-02-17 PROCEDURE — 99214 OFFICE O/P EST MOD 30 MIN: CPT | Performed by: PHYSICIAN ASSISTANT

## 2023-02-17 NOTE — PATIENT INSTRUCTIONS
Today's Plan:   1) Continue with current medications.   2) Return for follow up in 3 months in cardiology clinic with fasting blood work.     If you have questions or concerns please call my nurse team at [phone].     Scheduling phone number: [phone].  Reminder: Please bring in all current medications, over the counter supplements and vitamin bottles to your next appointment.    It was a pleasure seeing you today!

## 2023-02-17 NOTE — LETTER
2/17/2023    Armando Tompkins MD  [address] [place] [address]    RE: Rodrigo Lanier       Dear Colleague,     I had the pleasure of seeing Rodrigo Lanier in the [facility].  Primary Cardiologist: He will establish care with Dr. Salmeron    Reason for Visit: Hospital follow-up    History of Present Illness:   Rodrigo is a pleasant 67-year-old male who underwent CABG x4 in 11/2022 (LIMA to LAD, SVG to OM, SVG to right PDA, and SVG to diagonal; on aspirin 162 mg daily), hypertension, and hyperlipidemia.  His echocardiogram showed LVEF of 50 to 55% with multiple regional wall motion abnormalities.  He had no valve disease.  He was started on high intensity statin therapy as well as ACE inhibitor and beta-blocker.    He returns to clinic today stating he is doing well.  He does not have any chest wall pain.  He will graduate from cardiac rehab next week.  He is working on trying to come up with a cardio exercise plan.  He is motivated to change his diet.  He has no shortness of breath, palpitations, orthopnea, abdominal distention, or peripheral edema.  He denies any bleeding issues with aspirin.      Assessment and Plan:  Rodrigo is a pleasant 67-year-old male who underwent CABG x4 in 11/2022 (LIMA to LAD, SVG to OM, SVG to right PDA, and SVG to diagonal; on aspirin 162 mg daily), hypertension, and hyperlipidemia.  His echocardiogram showed LVEF of 50 to 55% with multiple regional wall motion abnormalities.  He had no valve disease.  He was started on high intensity statin therapy as well as ACE inhibitor and beta-blocker.    Rodrigo appears to be doing well from a cardiac standpoint.  We will continue with current medications but I asked him to reduce his aspirin to 81 mg daily.  He already has follow-up scheduled to establish care with a cardiologist in our clinic in about 2 months.  We will add fasting lipid panel/ALT prior to that visit.    This note was completed in part using Dragon  voice recognition software. Although reviewed after completion, some word and grammatical errors may occur.    Orders this Visit:  No orders of the defined types were placed in this encounter.    No orders of the defined types were placed in this encounter.    There are no discontinued medications.      No diagnosis found.    CURRENT MEDICATIONS:  Current Outpatient Medications   Medication Sig Dispense Refill     acetaminophen (TYLENOL) 325 MG tablet Take 2 tablets (650 mg) by mouth every 4 hours as needed for other or mild pain 40 tablet 0     aspirin (ASA) 81 MG chewable tablet 2 tablets (162 mg) by Oral or NG Tube route daily 30 tablet 0     lisinopril (ZESTRIL) 5 MG tablet Take 1 tablet (5 mg) by mouth daily 30 tablet 3     metFORMIN (GLUCOPHAGE) 500 MG tablet Take 1 tablet (500 mg) by mouth 2 times daily (with meals) 60 tablet 3     metoprolol tartrate (LOPRESSOR) 100 MG tablet Take 1 tablet (100 mg) by mouth 2 times daily 60 tablet 3     rosuvastatin (CRESTOR) 40 MG tablet Take 1 tablet (40 mg) by mouth daily 90 tablet 3       ALLERGIES   No Known Allergies    PAST MEDICAL HISTORY:  Past Medical History:   Diagnosis Date     CAD (coronary artery disease)      HTN (hypertension)      Mixed hyperlipidemia      Nicotine dependence      Type 2 diabetes mellitus (H)        PAST SURGICAL HISTORY:  Past Surgical History:   Procedure Laterality Date     BYPASS GRAFT ARTERY CORONARY N/A 11/16/2022    Procedure: CORONARY ARTERY BYPASS GRAFT x 4 (LEFT INTERNAL MAMMARY ARTERY - LEFT ANTERIOR DESCENDING ARTERY; SAPHENOUS VEIN - OBTUSE MARGINAL ARTERY; SAPHENOUS VEIN - RIGHT POSTERIOR DESCENDING ARTERY; SAPHENOUS VEIN - DIAGONAL ARTERY) WITH ENDOSCOPIC SAPHENOUS VEIN HARVEST ON THE LEFT LOWER EXTREMITY, AND ON CARDIOPULMONARY PUMP OXYGENATOR  (INTRAOPERATIVE TRANSESOPHAGEAL ECHOCARDIOGRAM BY ANESTHE     CV CORONARY ANGIOGRAM N/A 11/16/2022    Procedure: Coronary Angiogram;  Surgeon: Truong Morales MD;  Location:   "HEART CARDIAC CATH LAB     CV INTRA AORTIC BALLOON N/A 2022    Procedure: Intraprocedure Aortic Balloon Pump Insertion;  Surgeon: Truong Morales MD;  Location:  HEART CARDIAC CATH LAB     ELBOW SURGERY  2002     FINGER SURGERY      thumb     LEG SURGERY      femur     STRABISMUS SURGERY         FAMILY HISTORY:  Family History   Problem Relation Age of Onset     CABG Mother 74     Diabetes Father      Coronary Artery Disease Father 55         at home from a presumed fatal myocardial infarction at age 55 years.     No Known Problems Brother      No Known Problems Brother      No Known Problems Brother      No Known Problems Brother      No Known Problems Brother      No Known Problems Brother      Cancer Brother      No Known Problems Sister      No Known Problems Sister      No Known Problems Sister      Arrhythmia No family hx of      Cardiomyopathy No family hx of      Glaucoma No family hx of      Macular Degeneration No family hx of        SOCIAL HISTORY:  Social History     Socioeconomic History     Marital status: Single     Spouse name: None     Number of children: None     Years of education: None     Highest education level: None   Tobacco Use     Smoking status: Former     Packs/day: 0.50     Years: 45.00     Pack years: 22.50     Types: Cigarettes     Start date:      Quit date: 11/15/2022     Years since quittin.2     Smokeless tobacco: Never   Substance and Sexual Activity     Alcohol use: Yes     Comment: 2-4 beers per month.     Drug use: Never       Review of Systems:  Skin:        Eyes:       ENT:       Respiratory:       Cardiovascular:       Gastroenterology:      Genitourinary:       Musculoskeletal:       Neurologic:       Psychiatric:       Heme/Lymph/Imm:       Endocrine:         Physical Exam:  Vitals: /70 (BP Location: Left arm, Patient Position: Left side)   Pulse 54   Ht 1.715 m (5' 7.5\")   Wt 67.3 kg (148 lb 6.4 oz)   SpO2 96%   BMI 22.90 kg/m   "     GEN:  NAD  NECK: No JVD  C/V:  Regular rate and rhythm, no murmur, rub or gallop.  RESP: Clear to auscultation bilaterally without wheezing, rales, or rhonchi.  GI: Abdomen soft, nontender, nondistended.   EXTREM: No pitting LE edema.   NEURO: Alert and oriented, cooperative. No obvious focal deficits.   PSYCH: Normal affect.  SKIN: Warm and dry.       Recent Lab Results:  LIPID RESULTS:  Lab Results   Component Value Date    CHOL 258 (H) 11/16/2022    HDL 40 11/16/2022     (H) 11/16/2022    TRIG 227 (H) 11/16/2022       LIVER ENZYME RESULTS:  Lab Results   Component Value Date     (H) 11/17/2022    ALT 49 11/17/2022       CBC RESULTS:  Lab Results   Component Value Date    WBC 11.3 (H) 11/20/2022    RBC 3.24 (L) 11/20/2022    HGB 9.6 (L) 11/20/2022    HCT 28.9 (L) 11/20/2022    MCV 89 11/20/2022    MCH 29.6 11/20/2022    MCHC 33.2 11/20/2022    RDW 12.3 11/20/2022     11/20/2022       BMP RESULTS:  Lab Results   Component Value Date     11/20/2022    POTASSIUM 3.5 11/20/2022    CHLORIDE 103 11/20/2022    CO2 26 11/20/2022    ANIONGAP 8 11/20/2022     (H) 11/20/2022     (H) 11/20/2022    BUN 27 11/20/2022    CR 0.73 11/20/2022    GFRESTIMATED >90 11/20/2022    LINDA 8.5 11/20/2022        A1C RESULTS:  Lab Results   Component Value Date    A1C 6.6 (H) 11/16/2022       INR RESULTS:  Lab Results   Component Value Date    INR 1.28 (H) 11/16/2022    INR 1.52 (H) 11/16/2022           Malik Young PA-C  February 17, 2023     Thank you for allowing me to participate in the care of your patient.      Sincerely,     Malik Young PA-C     [facility]  cc:   No referring provider defined for this encounter.

## 2023-02-17 NOTE — PROGRESS NOTES
Primary Cardiologist: He will establish care with Dr. Salmeron    Reason for Visit: Hospital follow-up    History of Present Illness:   Rodrigo is a pleasant 67-year-old male who underwent CABG x4 in 11/2022 (LIMA to LAD, SVG to OM, SVG to right PDA, and SVG to diagonal; on aspirin 162 mg daily), hypertension, and hyperlipidemia.  His echocardiogram showed LVEF of 50 to 55% with multiple regional wall motion abnormalities.  He had no valve disease.  He was started on high intensity statin therapy as well as ACE inhibitor and beta-blocker.    He returns to clinic today stating he is doing well.  He does not have any chest wall pain.  He will graduate from cardiac rehab next week.  He is working on trying to come up with a cardio exercise plan.  He is motivated to change his diet.  He has no shortness of breath, palpitations, orthopnea, abdominal distention, or peripheral edema.  He denies any bleeding issues with aspirin.      Assessment and Plan:  Rodrigo is a pleasant 67-year-old male who underwent CABG x4 in 11/2022 (LIMA to LAD, SVG to OM, SVG to right PDA, and SVG to diagonal; on aspirin 162 mg daily), hypertension, and hyperlipidemia.  His echocardiogram showed LVEF of 50 to 55% with multiple regional wall motion abnormalities.  He had no valve disease.  He was started on high intensity statin therapy as well as ACE inhibitor and beta-blocker.    Rodrigo appears to be doing well from a cardiac standpoint.  We will continue with current medications but I asked him to reduce his aspirin to 81 mg daily.  He already has follow-up scheduled to establish care with a cardiologist in our clinic in about 2 months.  We will add fasting lipid panel/ALT prior to that visit.    This note was completed in part using Dragon voice recognition software. Although reviewed after completion, some word and grammatical errors may occur.    Orders this Visit:  No orders of the defined types were placed in this encounter.    No orders of the  defined types were placed in this encounter.    There are no discontinued medications.      No diagnosis found.    CURRENT MEDICATIONS:  Current Outpatient Medications   Medication Sig Dispense Refill     acetaminophen (TYLENOL) 325 MG tablet Take 2 tablets (650 mg) by mouth every 4 hours as needed for other or mild pain 40 tablet 0     aspirin (ASA) 81 MG chewable tablet 2 tablets (162 mg) by Oral or NG Tube route daily 30 tablet 0     lisinopril (ZESTRIL) 5 MG tablet Take 1 tablet (5 mg) by mouth daily 30 tablet 3     metFORMIN (GLUCOPHAGE) 500 MG tablet Take 1 tablet (500 mg) by mouth 2 times daily (with meals) 60 tablet 3     metoprolol tartrate (LOPRESSOR) 100 MG tablet Take 1 tablet (100 mg) by mouth 2 times daily 60 tablet 3     rosuvastatin (CRESTOR) 40 MG tablet Take 1 tablet (40 mg) by mouth daily 90 tablet 3       ALLERGIES   No Known Allergies    PAST MEDICAL HISTORY:  Past Medical History:   Diagnosis Date     CAD (coronary artery disease)      HTN (hypertension)      Mixed hyperlipidemia      Nicotine dependence      Type 2 diabetes mellitus (H)        PAST SURGICAL HISTORY:  Past Surgical History:   Procedure Laterality Date     BYPASS GRAFT ARTERY CORONARY N/A 11/16/2022    Procedure: CORONARY ARTERY BYPASS GRAFT x 4 (LEFT INTERNAL MAMMARY ARTERY - LEFT ANTERIOR DESCENDING ARTERY; SAPHENOUS VEIN - OBTUSE MARGINAL ARTERY; SAPHENOUS VEIN - RIGHT POSTERIOR DESCENDING ARTERY; SAPHENOUS VEIN - DIAGONAL ARTERY) WITH ENDOSCOPIC SAPHENOUS VEIN HARVEST ON THE LEFT LOWER EXTREMITY, AND ON CARDIOPULMONARY PUMP OXYGENATOR  (INTRAOPERATIVE TRANSESOPHAGEAL ECHOCARDIOGRAM BY ANESTHE     CV CORONARY ANGIOGRAM N/A 11/16/2022    Procedure: Coronary Angiogram;  Surgeon: Truong Morales MD;  Location: [facility] CARDIAC CATH LAB     CV INTRA AORTIC BALLOON N/A 11/16/2022    Procedure: Intraprocedure Aortic Balloon Pump Insertion;  Surgeon: Truong Morales MD;  Location: [facility] CARDIAC CATH LAB     ELBOW  "SURGERY  2002     FINGER SURGERY      thumb     LEG SURGERY      femur     STRABISMUS SURGERY         FAMILY HISTORY:  Family History   Problem Relation Age of Onset     CABG Mother 74     Diabetes Father      Coronary Artery Disease Father 55         at home from a presumed fatal myocardial infarction at age 55 years.     No Known Problems Brother      No Known Problems Brother      No Known Problems Brother      No Known Problems Brother      No Known Problems Brother      No Known Problems Brother      Cancer Brother      No Known Problems Sister      No Known Problems Sister      No Known Problems Sister      Arrhythmia No family hx of      Cardiomyopathy No family hx of      Glaucoma No family hx of      Macular Degeneration No family hx of        SOCIAL HISTORY:  Social History     Socioeconomic History     Marital status: Single     Spouse name: None     Number of children: None     Years of education: None     Highest education level: None   Tobacco Use     Smoking status: Former     Packs/day: 0.50     Years: 45.00     Pack years: 22.50     Types: Cigarettes     Start date:      Quit date: 11/15/2022     Years since quittin.2     Smokeless tobacco: Never   Substance and Sexual Activity     Alcohol use: Yes     Comment: 2-4 beers per month.     Drug use: Never       Review of Systems:  Skin:        Eyes:       ENT:       Respiratory:       Cardiovascular:       Gastroenterology:      Genitourinary:       Musculoskeletal:       Neurologic:       Psychiatric:       Heme/Lymph/Imm:       Endocrine:         Physical Exam:  Vitals: /70 (BP Location: Left arm, Patient Position: Left side)   Pulse 54   Ht 1.715 m (5' 7.5\")   Wt 67.3 kg (148 lb 6.4 oz)   SpO2 96%   BMI 22.90 kg/m       GEN:  NAD  NECK: No JVD  C/V:  Regular rate and rhythm, no murmur, rub or gallop.  RESP: Clear to auscultation bilaterally without wheezing, rales, or rhonchi.  GI: Abdomen soft, nontender, nondistended. "   EXTREM: No pitting LE edema.   NEURO: Alert and oriented, cooperative. No obvious focal deficits.   PSYCH: Normal affect.  SKIN: Warm and dry.       Recent Lab Results:  LIPID RESULTS:  Lab Results   Component Value Date    CHOL 258 (H) 11/16/2022    HDL 40 11/16/2022     (H) 11/16/2022    TRIG 227 (H) 11/16/2022       LIVER ENZYME RESULTS:  Lab Results   Component Value Date     (H) 11/17/2022    ALT 49 11/17/2022       CBC RESULTS:  Lab Results   Component Value Date    WBC 11.3 (H) 11/20/2022    RBC 3.24 (L) 11/20/2022    HGB 9.6 (L) 11/20/2022    HCT 28.9 (L) 11/20/2022    MCV 89 11/20/2022    MCH 29.6 11/20/2022    MCHC 33.2 11/20/2022    RDW 12.3 11/20/2022     11/20/2022       BMP RESULTS:  Lab Results   Component Value Date     11/20/2022    POTASSIUM 3.5 11/20/2022    CHLORIDE 103 11/20/2022    CO2 26 11/20/2022    ANIONGAP 8 11/20/2022     (H) 11/20/2022     (H) 11/20/2022    BUN 27 11/20/2022    CR 0.73 11/20/2022    GFRESTIMATED >90 11/20/2022    LINDA 8.5 11/20/2022        A1C RESULTS:  Lab Results   Component Value Date    A1C 6.6 (H) 11/16/2022       INR RESULTS:  Lab Results   Component Value Date    INR 1.28 (H) 11/16/2022    INR 1.52 (H) 11/16/2022           Malik Young PA-C  February 17, 2023

## 2023-02-28 ENCOUNTER — HOSPITAL ENCOUNTER (OUTPATIENT)
Dept: CARDIAC REHAB | Facility: CLINIC | Age: 68
Discharge: HOME | End: 2023-02-28
Attending: INTERNAL MEDICINE
Payer: MEDICARE

## 2023-02-28 PROCEDURE — 93798 PHYS/QHP OP CAR RHAB W/ECG: CPT

## 2023-02-28 PROCEDURE — 93797 PHYS/QHP OP CAR RHAB WO ECG: CPT | Mod: 59

## 2023-03-07 DIAGNOSIS — Z95.1 S/P CABG (CORONARY ARTERY BYPASS GRAFT): ICD-10-CM

## 2023-03-07 RX ORDER — LISINOPRIL 5 MG/1
5 TABLET ORAL DAILY
Qty: 90 TABLET | Refills: 0 | Status: SHIPPED | OUTPATIENT
Start: 2023-03-07 | End: 2023-05-12

## 2023-04-04 DIAGNOSIS — Z95.1 S/P CABG (CORONARY ARTERY BYPASS GRAFT): ICD-10-CM

## 2023-04-04 RX ORDER — METOPROLOL TARTRATE 100 MG
100 TABLET ORAL 2 TIMES DAILY
Qty: 180 TABLET | Refills: 0 | Status: SHIPPED | OUTPATIENT
Start: 2023-04-04 | End: 2023-05-12

## 2023-05-10 ENCOUNTER — LAB (OUTPATIENT)
Dept: LAB | Facility: CLINIC | Age: 68
End: 2023-05-10
Payer: MEDICARE

## 2023-05-10 DIAGNOSIS — E11.59 TYPE 2 DIABETES MELLITUS WITH OTHER CIRCULATORY COMPLICATION, WITHOUT LONG-TERM CURRENT USE OF INSULIN (H): ICD-10-CM

## 2023-05-10 DIAGNOSIS — Z95.1 S/P CABG (CORONARY ARTERY BYPASS GRAFT): ICD-10-CM

## 2023-05-10 DIAGNOSIS — E78.5 HYPERLIPIDEMIA LDL GOAL <70: ICD-10-CM

## 2023-05-10 DIAGNOSIS — Z11.59 NEED FOR HEPATITIS C SCREENING TEST: ICD-10-CM

## 2023-05-10 DIAGNOSIS — I10 BENIGN ESSENTIAL HYPERTENSION: ICD-10-CM

## 2023-05-11 ENCOUNTER — APPOINTMENT (OUTPATIENT)
Dept: LAB | Facility: CLINIC | Age: 68
End: 2023-05-11
Payer: MEDICARE

## 2023-05-11 LAB
ALT SERPL W P-5'-P-CCNC: 21 U/L (ref 10–50)
ANION GAP SERPL CALCULATED.3IONS-SCNC: 11 MMOL/L (ref 7–15)
BUN SERPL-MCNC: 17.7 MG/DL (ref 8–23)
CALCIUM SERPL-MCNC: 9.3 MG/DL (ref 8.8–10.2)
CHLORIDE SERPL-SCNC: 107 MMOL/L (ref 98–107)
CHOLEST SERPL-MCNC: 125 MG/DL
CREAT SERPL-MCNC: 1.02 MG/DL (ref 0.67–1.17)
DEPRECATED HCO3 PLAS-SCNC: 24 MMOL/L (ref 22–29)
GFR SERPL CREATININE-BSD FRML MDRD: 80 ML/MIN/1.73M2
GLUCOSE SERPL-MCNC: 131 MG/DL (ref 70–99)
HBA1C MFR BLD: 6.2 % (ref 0–5.6)
HDLC SERPL-MCNC: 46 MG/DL
LDLC SERPL CALC-MCNC: 63 MG/DL
NONHDLC SERPL-MCNC: 79 MG/DL
POTASSIUM SERPL-SCNC: 4.6 MMOL/L (ref 3.4–5.3)
SODIUM SERPL-SCNC: 142 MMOL/L (ref 136–145)
TRIGL SERPL-MCNC: 81 MG/DL

## 2023-05-11 PROCEDURE — 84460 ALANINE AMINO (ALT) (SGPT): CPT

## 2023-05-11 PROCEDURE — 86803 HEPATITIS C AB TEST: CPT

## 2023-05-11 PROCEDURE — 83036 HEMOGLOBIN GLYCOSYLATED A1C: CPT

## 2023-05-11 PROCEDURE — 80048 BASIC METABOLIC PNL TOTAL CA: CPT

## 2023-05-11 PROCEDURE — 80061 LIPID PANEL: CPT

## 2023-05-11 PROCEDURE — 36415 COLL VENOUS BLD VENIPUNCTURE: CPT

## 2023-05-12 ENCOUNTER — OFFICE VISIT (OUTPATIENT)
Dept: CARDIOLOGY | Facility: CLINIC | Age: 68
End: 2023-05-12
Payer: MEDICARE

## 2023-05-12 VITALS
HEART RATE: 48 BPM | HEIGHT: 68 IN | OXYGEN SATURATION: 96 % | DIASTOLIC BLOOD PRESSURE: 85 MMHG | BODY MASS INDEX: 22.73 KG/M2 | SYSTOLIC BLOOD PRESSURE: 158 MMHG | WEIGHT: 150 LBS

## 2023-05-12 DIAGNOSIS — E78.5 HYPERLIPIDEMIA LDL GOAL <70: ICD-10-CM

## 2023-05-12 DIAGNOSIS — I10 BENIGN ESSENTIAL HYPERTENSION: ICD-10-CM

## 2023-05-12 DIAGNOSIS — Z95.1 S/P CABG (CORONARY ARTERY BYPASS GRAFT): ICD-10-CM

## 2023-05-12 DIAGNOSIS — E11.59 TYPE 2 DIABETES MELLITUS WITH OTHER CIRCULATORY COMPLICATION, WITHOUT LONG-TERM CURRENT USE OF INSULIN (H): ICD-10-CM

## 2023-05-12 DIAGNOSIS — I25.5 ISCHEMIC CARDIOMYOPATHY: ICD-10-CM

## 2023-05-12 DIAGNOSIS — I25.10 CORONARY ARTERY DISEASE INVOLVING NATIVE CORONARY ARTERY OF NATIVE HEART WITHOUT ANGINA PECTORIS: Primary | ICD-10-CM

## 2023-05-12 LAB — HCV AB SERPL QL IA: NONREACTIVE

## 2023-05-12 PROCEDURE — 99215 OFFICE O/P EST HI 40 MIN: CPT | Performed by: INTERNAL MEDICINE

## 2023-05-12 PROCEDURE — 93000 ELECTROCARDIOGRAM COMPLETE: CPT | Performed by: INTERNAL MEDICINE

## 2023-05-12 RX ORDER — LISINOPRIL 20 MG/1
20 TABLET ORAL EVERY EVENING
Qty: 100 TABLET | Refills: 5 | Status: SHIPPED | OUTPATIENT
Start: 2023-05-12 | End: 2024-06-11

## 2023-05-12 RX ORDER — ASPIRIN 81 MG/1
81 TABLET, CHEWABLE ORAL DAILY
COMMUNITY
Start: 2023-05-12 | End: 2023-08-07

## 2023-05-12 RX ORDER — METOPROLOL SUCCINATE 50 MG/1
50 TABLET, EXTENDED RELEASE ORAL EVERY MORNING
Qty: 100 TABLET | Refills: 5 | Status: SHIPPED | OUTPATIENT
Start: 2023-05-12 | End: 2024-07-26

## 2023-05-12 NOTE — PATIENT INSTRUCTIONS
1.  Medications:  -- Switch from metoprolol to tartrate 100 mg 2 times daily to metoprolol succinate 50 mg daily.  Take in the morning.  --  Increase lisinopril from 5 mg daily to 20 mg daily.  Take in the evening.  -- Aspirin 81 mg daily.    2.  Testing and follow up:  -- You are scheduled to see your primary provider on May 24 where your blood pressure will be checked.    -- Please discuss a new diabetes lowering medication with your doctor (called SGLT2 inhibitors) which has been shown to decrease cardiovascular mortality and morbidity in diabetic patients with heart disease.   --  Follow-up with cardiology in 1 year with cardiac MRI and fasting labs.    3. If you have any questions, please send me a [email or web address] message.

## 2023-05-12 NOTE — LETTER
5/12/2023    Armando Tompkins MD  [address] [place] [address]    RE: Rodrigo Lanier       Dear Colleague,     I had the pleasure of seeing Rodrigo Lanier in the [facility].    SERVICE DATE: 5/12/2023    REFERRING PROVIDER:  Malik Young PA-C  [address] [place] [address]    PRIMARY CARE PROVIDER:  Armando Tompkins  [address] [place] [address]    REASON FOR VISIT:  Follow-up after coronary artery bypass grafting.    HISTORY OF PRESENT ILLNESS:  Rodrigo is a 67-year-old  male with, who until recently did not seek regular medical care and was not on any medications. He presented to the emergency room in November 2022 with NSTEMI, LVEF of 55% with inferior, inferolateral and lateral wall kinesis and no significant valve heart disease.  Work-up revealed severe triple-vessel coronary artery disease, new diagnosis of type 2 diabetes mellitus, new diagnosis of hypertension and hyperlipidemia.  Former tobacco user.    He underwent coronary artery bypass grafting on 11/16/2022 by Dr. Paolo Quinonez with a LIMA to the LAD, vein graft to obtuse marginal, right posterior descending artery and diagonal arteries.  Successful CABG, no intraoperative or postoperative complications.      Seen by cardiology MURPHY 2 months ago.      Patient is asymptomatic and working part-time, successfully completed cardiac rehab exercise program, LDL at 63 (normal HDL and triglycerides), normal renal panel with a creatinine of 1.0, blood pressure suboptimally controlled at 158/85, resting sinus bradycardia of 48 bpm.     I personally reviewed and independently interpreted labs, ECG, echocardiogram images, coronary angiogram images as documented above.    DIAGNOSES/ASSESSMENT:  1.  Coronary artery disease status post CABG on 7/16/2022.  Successfully revascularized.  Asymptomatic.  2.  Mild ischemic cardiomyopathy, LVEF 50% with inferior and lateral wall motion abnormalities,  "asymptomatic.  Good prognosis for recovery.  Transition to maximally tolerated dose of lisinopril and metoprolol XL and add SGLT2 inhibitor.  3.  Type 2 diabetes mellitus, not on insulin, no complications.  She will redo inhibitor to be added by primary team.  4.  Hyperlipidemia with goal LDL less than 70.  LDL at goal.  Continue rosuvastatin 40 mg daily.  5.  Benign essential hypertension.  Uncontrolled.  Uptitrate lisinopril.    PLAN:  Medication:  1.  Patient is bradycardic.  Switch from metoprolol tartrate 100 mg 2 times daily to metoprolol succinate 50 mg daily.  Take in the morning.  2.  Blood pressure is suboptimally controlled.  Increase lisinopril from 5 mg daily to 20 mg daily.  Take in the evening.  3.  Aspirin 81 mg daily.  4.  Continue rosuvastatin 40 mg daily.    Follow-up:  1.  Patient has follow-up scheduled with his primary provider on May 24.  Blood pressure to be assessed then and lisinopril uptitrated, as needed.  Goal BP is 130/70 or below.  2.  Advised patient to discuss SGLT2 inhibitors at his upcoming primary care appointment.  This class of drug has been shown to decrease cardiovascular events and mortality in diabetic patients with heart disease.  3.  Follow-up with me in clinic with 1 year with previsit fasting labs and cardiac MRI to assess recovery of wall motion abnormality and LVEF post complete revascularization and GDMT.    Detailed visit summary reviewed and provided to patient      Marissa Salmeron MD      Established patient.   45 minutes spent by me on the date of the encounter doing chart review, history and exam, documentation and further activities per the note.        PHYSICAL EXAMINATION:  Vitals: BP (!) 158/85   Pulse (!) 48   Ht 1.727 m (5' 8\")   Wt 68 kg (150 lb)   SpO2 96%   BMI 22.81 kg/m    Wt Readings from Last 5 Encounters:   05/12/23 68 kg (150 lb)   02/17/23 67.3 kg (148 lb 6.4 oz)   01/04/23 65.8 kg (145 lb)   12/07/22 67.6 kg (149 lb)   11/20/22 74.5 kg " (164 lb 3.2 oz)     CARDIOVASCULAR:  Regular heart sounds.  No murmur. No carotid bruit.  Sternal wound is well-healed.  RESPIRATORY:  Normal breath sounds. No rales or wheeze.  EXTREMITIES:  No edema.    Encounter Diagnoses   Name Primary?    Coronary artery disease involving native coronary artery of native heart without angina pectoris Yes    Ischemic cardiomyopathy     S/P CABG (coronary artery bypass graft)     Benign essential hypertension     Hyperlipidemia LDL goal <70     Type 2 diabetes mellitus with other circulatory complication, without long-term current use of insulin (H)          Orders Placed This Encounter   Procedures    Comprehensive metabolic panel    Hemoglobin A1c    CBC with platelets    Lipid Profile    Follow-Up with Cardiology    EKG 12-lead complete w/read - Clinics (performed today)    EKG 12-lead complete w/read - Clinics (to be scheduled)           CURRENT MEDICATIONS:  Current Outpatient Medications   Medication Sig Dispense Refill    acetaminophen (TYLENOL) 325 MG tablet Take 2 tablets (650 mg) by mouth every 4 hours as needed for other or mild pain 40 tablet 0    aspirin (ASA) 81 MG chewable tablet Take 1 tablet (81 mg) by mouth daily      lisinopril (ZESTRIL) 20 MG tablet Take 1 tablet (20 mg) by mouth every evening 100 tablet 5    metFORMIN (GLUCOPHAGE) 500 MG tablet Take 1 tablet (500 mg) by mouth 2 times daily (with meals) 60 tablet 3    metoprolol succinate ER (TOPROL XL) 50 MG 24 hr tablet Take 1 tablet (50 mg) by mouth every morning 100 tablet 5    rosuvastatin (CRESTOR) 40 MG tablet Take 1 tablet (40 mg) by mouth daily 90 tablet 3         ALLERGIES:  No Known Allergies    PAST MEDICAL HISTORY:    Past Medical History:   Diagnosis Date    CAD (coronary artery disease)     s/p CABG x4 in 11/2022 (LIMA to LAD, SVG to OM, SVG to right PDA, and SVG to diagonal    HTN (hypertension)     Mixed hyperlipidemia     Nicotine dependence     Type 2 diabetes mellitus (H)        PAST  SURGICAL HISTORY:    Past Surgical History:   Procedure Laterality Date    BYPASS GRAFT ARTERY CORONARY N/A 2022    Procedure: CORONARY ARTERY BYPASS GRAFT x 4 (LEFT INTERNAL MAMMARY ARTERY - LEFT ANTERIOR DESCENDING ARTERY; SAPHENOUS VEIN - OBTUSE MARGINAL ARTERY; SAPHENOUS VEIN - RIGHT POSTERIOR DESCENDING ARTERY; SAPHENOUS VEIN - DIAGONAL ARTERY) WITH ENDOSCOPIC SAPHENOUS VEIN HARVEST ON THE LEFT LOWER EXTREMITY, AND ON CARDIOPULMONARY PUMP OXYGENATOR  (INTRAOPERATIVE TRANSESOPHAGEAL ECHOCARDIOGRAM BY ANESTHE    CV CORONARY ANGIOGRAM N/A 2022    Procedure: Coronary Angiogram;  Surgeon: Truong Morales MD;  Location: [facility] CARDIAC CATH LAB    CV INTRA AORTIC BALLOON N/A 2022    Procedure: Intraprocedure Aortic Balloon Pump Insertion;  Surgeon: Truong Morales MD;  Location: [facility] CARDIAC CATH LAB    ELBOW SURGERY      FINGER SURGERY      thumb    LEG SURGERY      femur    STRABISMUS SURGERY         FAMILY HISTORY:    Family History   Problem Relation Age of Onset    CABG Mother 74    Diabetes Father     Coronary Artery Disease Father 55         at home from a presumed fatal myocardial infarction at age 55 years.    No Known Problems Brother     No Known Problems Brother     No Known Problems Brother     No Known Problems Brother     No Known Problems Brother     No Known Problems Brother     Cancer Brother     No Known Problems Sister     No Known Problems Sister     No Known Problems Sister     Arrhythmia No family hx of     Cardiomyopathy No family hx of     Glaucoma No family hx of     Macular Degeneration No family hx of        SOCIAL HISTORY:    Social History     Socioeconomic History    Marital status: Single   Tobacco Use    Smoking status: Former     Packs/day: 0.50     Years: 45.00     Pack years: 22.50     Types: Cigarettes     Start date:      Quit date: 11/15/2022     Years since quittin.4    Smokeless tobacco: Never   Substance and Sexual Activity     Alcohol use: Yes     Comment: 2-4 beers per month.    Drug use: Never       Thank you for allowing me to participate in the care of your patient.      Sincerely,     Marissa Salmeron MD     [facility]  cc:   Malik Young PA-C  [address] [address]

## 2023-05-12 NOTE — PROGRESS NOTES
SERVICE DATE: 5/12/2023    REFERRING PROVIDER:  Malik Young PA-C  [address] [place] [address]    PRIMARY CARE PROVIDER:  Armando Tompkins  [address] [place] [address]    REASON FOR VISIT:  Follow-up after coronary artery bypass grafting.    HISTORY OF PRESENT ILLNESS:  Rodrigo is a 67-year-old  male with, who until recently did not seek regular medical care and was not on any medications. He presented to the emergency room in November 2022 with NSTEMI, LVEF of 55% with inferior, inferolateral and lateral wall kinesis and no significant valve heart disease.  Work-up revealed severe triple-vessel coronary artery disease, new diagnosis of type 2 diabetes mellitus, new diagnosis of hypertension and hyperlipidemia.  Former tobacco user.    He underwent coronary artery bypass grafting on 11/16/2022 by Dr. Paolo Quinonez with a LIMA to the LAD, vein graft to obtuse marginal, right posterior descending artery and diagonal arteries.  Successful CABG, no intraoperative or postoperative complications.      Seen by cardiology MURPHY 2 months ago.      Patient is asymptomatic and working part-time, successfully completed cardiac rehab exercise program, LDL at 63 (normal HDL and triglycerides), normal renal panel with a creatinine of 1.0, blood pressure suboptimally controlled at 158/85, resting sinus bradycardia of 48 bpm.     I personally reviewed and independently interpreted labs, ECG, echocardiogram images, coronary angiogram images as documented above.    DIAGNOSES/ASSESSMENT:  1.  Coronary artery disease status post CABG on 7/16/2022.  Successfully revascularized.  Asymptomatic.  2.  Mild ischemic cardiomyopathy, LVEF 50% with inferior and lateral wall motion abnormalities, asymptomatic.  Good prognosis for recovery.  Transition to maximally tolerated dose of lisinopril and metoprolol XL and add SGLT2 inhibitor.  3.  Type 2 diabetes mellitus, not on insulin, no complications.  She  "will redo inhibitor to be added by primary team.  4.  Hyperlipidemia with goal LDL less than 70.  LDL at goal.  Continue rosuvastatin 40 mg daily.  5.  Benign essential hypertension.  Uncontrolled.  Uptitrate lisinopril.    PLAN:  Medication:  1.  Patient is bradycardic.  Switch from metoprolol tartrate 100 mg 2 times daily to metoprolol succinate 50 mg daily.  Take in the morning.  2.  Blood pressure is suboptimally controlled.  Increase lisinopril from 5 mg daily to 20 mg daily.  Take in the evening.  3.  Aspirin 81 mg daily.  4.  Continue rosuvastatin 40 mg daily.    Follow-up:  1.  Patient has follow-up scheduled with his primary provider on May 24.  Blood pressure to be assessed then and lisinopril uptitrated, as needed.  Goal BP is 130/70 or below.  2.  Advised patient to discuss SGLT2 inhibitors at his upcoming primary care appointment.  This class of drug has been shown to decrease cardiovascular events and mortality in diabetic patients with heart disease.  3.  Follow-up with me in clinic with 1 year with previsit fasting labs and cardiac MRI to assess recovery of wall motion abnormality and LVEF post complete revascularization and GDMT.    Detailed visit summary reviewed and provided to patient      Marissa Salmeron MD      Established patient.   45 minutes spent by me on the date of the encounter doing chart review, history and exam, documentation and further activities per the note.        PHYSICAL EXAMINATION:  Vitals: BP (!) 158/85   Pulse (!) 48   Ht 1.727 m (5' 8\")   Wt 68 kg (150 lb)   SpO2 96%   BMI 22.81 kg/m    Wt Readings from Last 5 Encounters:   05/12/23 68 kg (150 lb)   02/17/23 67.3 kg (148 lb 6.4 oz)   01/04/23 65.8 kg (145 lb)   12/07/22 67.6 kg (149 lb)   11/20/22 74.5 kg (164 lb 3.2 oz)     CARDIOVASCULAR:  Regular heart sounds.  No murmur. No carotid bruit.  Sternal wound is well-healed.  RESPIRATORY:  Normal breath sounds. No rales or wheeze.  EXTREMITIES:  No " edema.    Encounter Diagnoses   Name Primary?     Coronary artery disease involving native coronary artery of native heart without angina pectoris Yes     Ischemic cardiomyopathy      S/P CABG (coronary artery bypass graft)      Benign essential hypertension      Hyperlipidemia LDL goal <70      Type 2 diabetes mellitus with other circulatory complication, without long-term current use of insulin (H)          Orders Placed This Encounter   Procedures     Comprehensive metabolic panel     Hemoglobin A1c     CBC with platelets     Lipid Profile     Follow-Up with Cardiology     EKG 12-lead complete w/read - Clinics (performed today)     EKG 12-lead complete w/read - Clinics (to be scheduled)           CURRENT MEDICATIONS:  Current Outpatient Medications   Medication Sig Dispense Refill     acetaminophen (TYLENOL) 325 MG tablet Take 2 tablets (650 mg) by mouth every 4 hours as needed for other or mild pain 40 tablet 0     aspirin (ASA) 81 MG chewable tablet Take 1 tablet (81 mg) by mouth daily       lisinopril (ZESTRIL) 20 MG tablet Take 1 tablet (20 mg) by mouth every evening 100 tablet 5     metFORMIN (GLUCOPHAGE) 500 MG tablet Take 1 tablet (500 mg) by mouth 2 times daily (with meals) 60 tablet 3     metoprolol succinate ER (TOPROL XL) 50 MG 24 hr tablet Take 1 tablet (50 mg) by mouth every morning 100 tablet 5     rosuvastatin (CRESTOR) 40 MG tablet Take 1 tablet (40 mg) by mouth daily 90 tablet 3         ALLERGIES:  No Known Allergies    PAST MEDICAL HISTORY:    Past Medical History:   Diagnosis Date     CAD (coronary artery disease)     s/p CABG x4 in 11/2022 (LIMA to LAD, SVG to OM, SVG to right PDA, and SVG to diagonal     HTN (hypertension)      Mixed hyperlipidemia      Nicotine dependence      Type 2 diabetes mellitus (H)        PAST SURGICAL HISTORY:    Past Surgical History:   Procedure Laterality Date     BYPASS GRAFT ARTERY CORONARY N/A 11/16/2022    Procedure: CORONARY ARTERY BYPASS GRAFT x 4 (LEFT  INTERNAL MAMMARY ARTERY - LEFT ANTERIOR DESCENDING ARTERY; SAPHENOUS VEIN - OBTUSE MARGINAL ARTERY; SAPHENOUS VEIN - RIGHT POSTERIOR DESCENDING ARTERY; SAPHENOUS VEIN - DIAGONAL ARTERY) WITH ENDOSCOPIC SAPHENOUS VEIN HARVEST ON THE LEFT LOWER EXTREMITY, AND ON CARDIOPULMONARY PUMP OXYGENATOR  (INTRAOPERATIVE TRANSESOPHAGEAL ECHOCARDIOGRAM BY ANESTHE     CV CORONARY ANGIOGRAM N/A 2022    Procedure: Coronary Angiogram;  Surgeon: Truong Morales MD;  Location:  [facility] CARDI[facility] CATH LAB     CV INTRA AORTIC BALLOON N/A 2022    Procedure: Intraprocedure Aortic Balloon Pump Insertion;  Surgeon: Truong Morales MD;  Location: [facility] CARDIAC CATH LAB     ELBOW SURGERY  2002     FINGER SURGERY      thumb     LEG SURGERY      femur     STRABISMUS SURGERY         FAMILY HISTORY:    Family History   Problem Relation Age of Onset     CABG Mother 74     Diabetes Father      Coronary Artery Disease Father 55         at home from a presumed fatal myocardial infarction at age 55 years.     No Known Problems Brother      No Known Problems Brother      No Known Problems Brother      No Known Problems Brother      No Known Problems Brother      No Known Problems Brother      Cancer Brother      No Known Problems Sister      No Known Problems Sister      No Known Problems Sister      Arrhythmia No family hx of      Cardiomyopathy No family hx of      Glaucoma No family hx of      Macular Degeneration No family hx of        SOCIAL HISTORY:    Social History     Socioeconomic History     Marital status: Single   Tobacco Use     Smoking status: Former     Packs/day: 0.50     Years: 45.00     Pack years: 22.50     Types: Cigarettes     Start date:      Quit date: 11/15/2022     Years since quittin.4     Smokeless tobacco: Never   Substance and Sexual Activity     Alcohol use: Yes     Comment: 2-4 beers per month.     Drug use: Never

## 2023-05-24 ENCOUNTER — OFFICE VISIT (OUTPATIENT)
Dept: INTERNAL MEDICINE | Facility: CLINIC | Age: 68
End: 2023-05-24
Payer: MEDICARE

## 2023-05-24 VITALS
TEMPERATURE: 97.6 F | DIASTOLIC BLOOD PRESSURE: 78 MMHG | HEIGHT: 68 IN | BODY MASS INDEX: 22.55 KG/M2 | SYSTOLIC BLOOD PRESSURE: 138 MMHG | RESPIRATION RATE: 16 BRPM | WEIGHT: 148.8 LBS | OXYGEN SATURATION: 97 % | HEART RATE: 63 BPM

## 2023-05-24 DIAGNOSIS — E11.59 TYPE 2 DIABETES MELLITUS WITH OTHER CIRCULATORY COMPLICATION, WITHOUT LONG-TERM CURRENT USE OF INSULIN (H): ICD-10-CM

## 2023-05-24 DIAGNOSIS — R91.8 PULMONARY NODULES: ICD-10-CM

## 2023-05-24 DIAGNOSIS — I25.10 CORONARY ARTERY DISEASE INVOLVING NATIVE HEART WITHOUT ANGINA PECTORIS, UNSPECIFIED VESSEL OR LESION TYPE: ICD-10-CM

## 2023-05-24 DIAGNOSIS — E78.2 MIXED HYPERLIPIDEMIA: ICD-10-CM

## 2023-05-24 DIAGNOSIS — N20.0 CALCULUS OF KIDNEY: ICD-10-CM

## 2023-05-24 DIAGNOSIS — I10 BENIGN ESSENTIAL HYPERTENSION: ICD-10-CM

## 2023-05-24 DIAGNOSIS — Z00.00 ENCOUNTER FOR MEDICARE ANNUAL WELLNESS EXAM: Primary | ICD-10-CM

## 2023-05-24 PROCEDURE — G0438 PPPS, INITIAL VISIT: HCPCS | Performed by: INTERNAL MEDICINE

## 2023-05-24 PROCEDURE — G0009 ADMIN PNEUMOCOCCAL VACCINE: HCPCS | Performed by: INTERNAL MEDICINE

## 2023-05-24 PROCEDURE — 99214 OFFICE O/P EST MOD 30 MIN: CPT | Mod: 25 | Performed by: INTERNAL MEDICINE

## 2023-05-24 PROCEDURE — 90677 PCV20 VACCINE IM: CPT | Performed by: INTERNAL MEDICINE

## 2023-05-24 ASSESSMENT — ENCOUNTER SYMPTOMS
MYALGIAS: 0
DIZZINESS: 0
CHILLS: 0
SHORTNESS OF BREATH: 1
WEAKNESS: 0
DYSURIA: 0
SORE THROAT: 0
ABDOMINAL PAIN: 0
COUGH: 0
NAUSEA: 0
HEARTBURN: 0
FREQUENCY: 0
HEMATOCHEZIA: 0
JOINT SWELLING: 0
HEADACHES: 0
CONSTIPATION: 0
PARESTHESIAS: 0
ARTHRALGIAS: 0
HEMATURIA: 0
NERVOUS/ANXIOUS: 0
FEVER: 0
PALPITATIONS: 0
DIARRHEA: 0
EYE PAIN: 0

## 2023-05-24 ASSESSMENT — ACTIVITIES OF DAILY LIVING (ADL): CURRENT_FUNCTION: NO ASSISTANCE NEEDED

## 2023-05-24 NOTE — PROGRESS NOTES
SUBJECTIVE:   Rodrigo is a 67 year old who presents for Preventive Visit.      2023     9:46 AM   Additional Questions   Roomed by tim   Accompanied by alone         2023     9:46 AM   Patient Reported Additional Medications   Patient reports taking the following new medications none     Patient has been advised of split billing requirements and indicates understanding: Yes  Are you in the first 12 months of your Medicare coverage?  No    HPI      Have you ever done Advance Care Planning? (For example, a Health Directive, POLST, or a discussion with a medical provider or your loved ones about your wishes): No, advance care planning information given to patient to review.  Patient plans to discuss their wishes with loved ones or provider.         Fall risk  Fallen 2 or more times in the past year?: No  Any fall with injury in the past year?: No    Cognitive Screening Patient has no identifiable cognitive impairment based on our visit today through our conversation and examination     Do you have sleep apnea, excessive snoring or daytime drowsiness?: no    Reviewed and updated as needed this visit by clinical staff   Tobacco  Allergies  Meds              Reviewed and updated as needed this visit by Provider                 Social History     Tobacco Use     Smoking status: Former     Packs/day: 0.50     Years: 45.00     Pack years: 22.50     Types: Cigarettes     Start date:      Quit date: 11/15/2022     Years since quittin.5     Smokeless tobacco: Never   Vaping Use     Vaping status: Not on file   Substance Use Topics     Alcohol use: Yes     Comment: 2-4 beers per month.              View : No data to display.              Do you have a current opioid prescription? No  Do you use any other controlled substances or medications that are not prescribed by a provider? None              Current providers sharing in care for this patient include:   Patient Care Team:  Armando Tompkins MD  "as PCP - General (Internal Medicine)  Armando Tompkins MD as Assigned PCP  Kylie Becerra MD as MD (Ophthalmology)  Kylie Becerra MD as Assigned Surgical Provider  Malik Young PA-C as Assigned Heart and Vascular Provider    The following health maintenance items are reviewed in Epic and correct as of today:  Health Maintenance   Topic Date Due     DIABETIC FOOT EXAM  Never done     ADVANCE CARE PLANNING  Never done     Pneumococcal Vaccine: 65+ Years (1 - PCV) Never done     ZOSTER IMMUNIZATION (1 of 2) Never done     LUNG CANCER SCREENING  Never done     MEDICARE ANNUAL WELLNESS VISIT  07/19/2019     AORTIC ANEURYSM SCREENING (SYSTEM ASSIGNED)  Never done     COVID-19 Vaccine (5 - Pfizer series) 05/04/2023     A1C  08/11/2023     MICROALBUMIN  01/04/2024     ANNUAL REVIEW OF HM ORDERS  01/04/2024     EYE EXAM  01/16/2024     BMP  05/11/2024     LIPID  05/11/2024     FALL RISK ASSESSMENT  05/24/2024     DTAP/TDAP/TD IMMUNIZATION (2 - Td or Tdap) 10/16/2025     COLORECTAL CANCER SCREENING  02/02/2026     HEPATITIS C SCREENING  Completed     PHQ-2 (once per calendar year)  Completed     INFLUENZA VACCINE  Completed     IPV IMMUNIZATION  Aged Out     MENINGITIS IMMUNIZATION  Aged Out               Review of Systems  Constitutional, HEENT, cardiovascular, pulmonary, gi and gu systems are negative, except as otherwise noted.    OBJECTIVE:   /78 (BP Location: Left arm, Patient Position: Sitting, Cuff Size: Adult Regular)   Pulse 63   Temp 97.6  F (36.4  C) (Oral)   Resp 16   Ht 1.727 m (5' 8\")   Wt 67.5 kg (148 lb 12.8 oz)   SpO2 97%   BMI 22.62 kg/m   Estimated body mass index is 22.81 kg/m  as calculated from the following:    Height as of 5/12/23: 1.727 m (5' 8\").    Weight as of 5/12/23: 68 kg (150 lb).  Physical Exam  GENERAL: healthy, alert and no distress  EYES: Eyes grossly normal to inspection, PERRL and conjunctivae and sclerae normal  HENT: ear canals and TM's normal, nose and " mouth without ulcers or lesions  NECK: no adenopathy, no asymmetry, masses, or scars and thyroid normal to palpation  RESP: lungs clear to auscultation - no rales, rhonchi or wheezes  CV: regular rate and rhythm, normal S1 S2, no S3 or S4, no murmur, click or rub, no peripheral edema and peripheral pulses strong  ABDOMEN: soft, nontender, no hepatosplenomegaly, no masses and bowel sounds normal  MS: no gross musculoskeletal defects noted, no edema  SKIN: no suspicious lesions or rashes  NEURO: Normal strength and tone, mentation intact and speech normal  PSYCH: mentation appears normal, affect normal/bright        ASSESSMENT / PLAN:   (Z00.00) Encounter for Medicare annual wellness exam  (primary encounter diagnosis)  Comment: normal exam  Plan: HM items reviewed    (I25.10) Coronary artery disease involving native heart without angina pectoris, unspecified vessel or lesion type  Comment: recent bypass---11/2022  Plan: feeling well. Sees cardiology yearly.    (E11.59) Type 2 diabetes mellitus with other circulatory complication, without long-term current use of insulin (H)  Comment: hx of, mild, hbga1c improved recently. Not on metformin anymore but SGLT2 inhibitor was recommended by cardiology due to his known heart disease/dm.  Plan: he is reluctantly to start SGLT2 inhibitor due to having to take another pill. We discussed benefits in great detail today. He will research on his own a bit and we will discuss again next visit later in the year.  Eye exam is up to date  See me in 6 months    (E78.2) Mixed hyperlipidemia  Comment: on statin  Plan: Will plan to continue on previous medication without changes     (I10) Benign essential hypertension  Comment: controlled--improved on higher lisinopril dose  Plan: elected to keep at same dose, lifestyle modifications will continue. Goal 70651 discussed. Consider bumping the dose at our next visit.    (R91.8) Pulmonary nodules  Comment: hx of single nodular opacity in  anterior right lung field  Plan: will obtain next visit--6 months. That will be one  Year from the original incidental finding. Discussed.    (N20.0) Calculus of kidney  Comment: incidental finding in 11/2022  Plan: Adult Urology  Referral        Urology referral placed. Discussed.            COUNSELING:  Reviewed preventive health counseling, as reflected in patient instructions        He reports that he quit smoking about 6 months ago. His smoking use included cigarettes. He started smoking about 46 years ago. He has a 22.50 pack-year smoking history. He has never used smokeless tobacco.      Appropriate preventive services were discussed with this patient, including applicable screening as appropriate for cardiovascular disease, diabetes, osteopenia/osteoporosis, and glaucoma.  As appropriate for age/gender, discussed screening for colorectal cancer, prostate cancer, breast cancer, and cervical cancer. Checklist reviewing preventive services available has been given to the patient.    Reviewed patients plan of care and provided an AVS. The Basic Care Plan (routine screening as documented in Health Maintenance) for Rodrigo meets the Care Plan requirement. This Care Plan has been established and reviewed with the Patient.          Armando Tompkins MD  [facility]    Identified Health Risks:    I have reviewed Opioid Use Disorder and Substance Use Disorder risk factors and made any needed referrals.

## 2023-05-24 NOTE — PATIENT INSTRUCTIONS
Patient Education   Personalized Prevention Plan  You are due for the preventive services outlined below.  Your care team is available to assist you in scheduling these services.  If you have already completed any of these items, please share that information with your care team to update in your medical record.  Health Maintenance Due   Topic Date Due     Diabetic Foot Exam  Never done     Pneumococcal Vaccine (1 - PCV) Never done     Zoster (Shingles) Vaccine (1 of 2) Never done     LUNG CANCER SCREENING  Never done     Annual Wellness Visit  07/19/2019     AORTIC ANEURYSM SCREENING (SYSTEM ASSIGNED)  Never done     COVID-19 Vaccine (5 - Pfizer series) 05/04/2023        
Home

## 2023-07-24 ENCOUNTER — VIRTUAL VISIT (OUTPATIENT)
Dept: UROLOGY | Facility: CLINIC | Age: 68
End: 2023-07-24
Attending: INTERNAL MEDICINE
Payer: MEDICARE

## 2023-07-24 VITALS — WEIGHT: 146 LBS | HEIGHT: 68 IN | BODY MASS INDEX: 22.13 KG/M2

## 2023-07-24 DIAGNOSIS — N20.0 CALCULUS OF KIDNEY: Primary | ICD-10-CM

## 2023-07-24 PROCEDURE — 99204 OFFICE O/P NEW MOD 45 MIN: CPT | Mod: VID | Performed by: STUDENT IN AN ORGANIZED HEALTH CARE EDUCATION/TRAINING PROGRAM

## 2023-07-24 ASSESSMENT — PAIN SCALES - GENERAL: PAINLEVEL: NO PAIN (0)

## 2023-07-24 NOTE — PROGRESS NOTES
Pt will meet you in [employer]    Rodrigo is a 68 year old who is being evaluated via a billable video visit.      How would you like to obtain your AVS? [employer]  If the video visit is dropped, the invitation should be resent by: Text to cell phone: [phone]  Will anyone else be joining your video visit? No        Video-Visit Details    Type of service:  Video Visit   Video Start Time: 10:35 AM  Video End Time:10:46 AM    Originating Location (pt. Location): Home    Distant Location (provider location):  Off-site  Platform used for Video Visit: [employer]        Chief Complaint:    Kidney/Ureteral Stone           Consult or Referral:     Mr. Rodrigo Lanier is a 68 year old male seen at the request of Dr. Tompkins.         History of Present Illness:    Rodrigo Lanier is a very pleasant 68 year old male who presents with a history of Kidney/Ureteral Stone.    Reviewed previous notes from Dr. Heike Wallace presents today to discuss a recent diagnosis of right kidney stones  He is totally asymptomatic was diagnosed about 6 months ago with multiple right kidney stones  No prior history of kidney stone passage  Reporting stone surgery           Past Medical History:     Past Medical History:   Diagnosis Date     CAD (coronary artery disease)     s/p CABG x4 in 11/2022 (LIMA to LAD, SVG to OM, SVG to right PDA, and SVG to diagonal     HTN (hypertension)      Mixed hyperlipidemia      Nicotine dependence      Type 2 diabetes mellitus (H)             Past Surgical History:     Past Surgical History:   Procedure Laterality Date     BYPASS GRAFT ARTERY CORONARY N/A 11/16/2022    Procedure: CORONARY ARTERY BYPASS GRAFT x 4 (LEFT INTERNAL MAMMARY ARTERY - LEFT ANTERIOR DESCENDING ARTERY; SAPHENOUS VEIN - OBTUSE MARGINAL ARTERY; SAPHENOUS VEIN - RIGHT POSTERIOR DESCENDING ARTERY; SAPHENOUS VEIN - DIAGONAL ARTERY) WITH ENDOSCOPIC SAPHENOUS VEIN HARVEST ON THE LEFT LOWER EXTREMITY, AND ON CARDIOPULMONARY PUMP OXYGENATOR  (INTRAOPERATIVE  TRANSESOPHAGEAL ECHOCARDIOGRAM BY ANESTHE     CV CORONARY ANGIOGRAM N/A 2022    Procedure: Coronary Angiogram;  Surgeon: Truong Morales MD;  Location:  HE[facility] CARDI[facility] CATH LAB     CV INTRA AORTIC BALLOON N/A 2022    Procedure: Intraprocedure Aortic Balloon Pump Insertion;  Surgeon: Truong Morales MD;  Location:  HEART CARDIAC CATH LAB     ELBOW SURGERY  2002     FINGER SURGERY      thumb     LEG SURGERY      femur     STRABISMUS SURGERY              Medications     Current Outpatient Medications   Medication     acetaminophen (TYLENOL) 325 MG tablet     aspirin (ASA) 81 MG chewable tablet     lisinopril (ZESTRIL) 20 MG tablet     metoprolol succinate ER (TOPROL XL) 50 MG 24 hr tablet     rosuvastatin (CRESTOR) 40 MG tablet     metFORMIN (GLUCOPHAGE) 500 MG tablet     No current facility-administered medications for this visit.            Family History:     Family History   Problem Relation Age of Onset     CABG Mother 74     Diabetes Father      Coronary Artery Disease Father 55         at home from a presumed fatal myocardial infarction at age 55 years.     No Known Problems Brother      No Known Problems Brother      No Known Problems Brother      No Known Problems Brother      No Known Problems Brother      No Known Problems Brother      Cancer Brother      No Known Problems Sister      No Known Problems Sister      No Known Problems Sister      Arrhythmia No family hx of      Cardiomyopathy No family hx of      Glaucoma No family hx of      Macular Degeneration No family hx of             Social History:     Social History     Socioeconomic History     Marital status: Single     Spouse name: Not on file     Number of children: Not on file     Years of education: Not on file     Highest education level: Not on file   Occupational History     Not on file   Tobacco Use     Smoking status: Former     Packs/day: 0.50     Years: 45.00     Pack years: 22.50     Types: Cigarettes      Start date:      Quit date: 11/15/2022     Years since quittin.6     Smokeless tobacco: Never   Substance and Sexual Activity     Alcohol use: Yes     Comment: 2-4 beers per month.     Drug use: Never     Sexual activity: Not on file   Other Topics Concern     Not on file   Social History Narrative     Not on file     Social Determinants of Health     Financial Resource Strain: Not on file   Food Insecurity: Not on file   Transportation Needs: Not on file   Physical Activity: Not on file   Stress: Not on file   Social Connections: Not on file   Intimate Partner Violence: Not on file   Housing Stability: Not on file            Allergies:   Patient has no known allergies.         Review of Systems:  From intake questionnaire     Skin: negative  Eyes: negative  Ears/Nose/Throat: negative  Respiratory: No shortness of breath, dyspnea on exertion, cough, or hemoptysis  Cardiovascular: No chest pain or palpitations  Gastrointestinal: negative; no nausea/vomiting, constipation or diarrhea  Genitourinary: as per HPI  Musculoskeletal: negative  Neurologic: negative  Psychiatric: negative  Hematologic/Lymphatic/Immunologic: negative  Endocrine: negative         Physical Exam:   This is a virtual visit    Alert, no acute distress, oriented, conversant    Ears/nose/mouth: mouth:normal, good dentition  Respiratory: no respiratory distress, or pursed lip breathing  Cardiovascular:no obvious jugular venous distension present  Skin: no suspicious lesions or rashes on Visible body parts on the Screen  Neuro: Alert, oriented, speech and mentation normal  Psych: affect and mood normal, alert and oriented to person, place and time      Labs and Pathology:    The following labs were reviewed by me and discussed with the patient:    Significant for   Lab Results   Component Value Date    CR 1.02 2023    CR 0.73 2022    CR 0.71 2022    CR 0.78 2022    CR 0.80 2022    CR 0.82 2022    CR 0.80  11/16/2022    CR 0.68 11/16/2022     Prostate Specific Antigen Screen   Date Value Ref Range Status   10/16/2015 0.9 0.0 - 4.5 ng/mL Final             Imaging:    The following imaging exams were independently viewed and interpreted by me and discussed with patient:  CT Scan Abd/Pelvis as a part of the CT aortic survey: Abnormal:   3 kidney stones in total on the right  Largest 1 measures 1.3  Cumulative size approximately 3.4 cm             Assessment and Plan:     Assessment:     Calculus of kidney  Multiple right kidney stones including kidney stones in the renal pelvis  Recommend repeat CT to be done  We will have him follow-up with urology [facility] to discuss possible PCNL  CT abdomen pelvis  Follow-up with Dr. Lucas and Dr. Hammond for discussion regarding PCNL  - Adult Urology  Referral  - CT Abdomen Pelvis w/o Contrast; Future      Orders  Orders Placed This Encounter   Procedures     CT Abdomen Pelvis w/o Contrast         Felton Tran MD  [facility] [place]                  ==========================    Additional Billing and Coding Information:  Review of external notes as documented above   Review of the result(s) of each unique test - Creatinine    Independent interpretation of a test performed by another physician/other qualified health care professional (not separately reported) -   CT abdomen pelvis was reviewed and discussed with patient    Discussion of management or test interpretation with external physician/other qualified healthcare professional/appropriate source -           19 minutes spent by me on the date of the encounter doing chart review, review of test results, interpretation of tests, patient visit and documentation     ==========================

## 2023-07-24 NOTE — LETTER
7/24/2023       RE: Rodrigo Lanier  [address]  [address]  [place] [address]     Dear Colleague,    Thank you for referring your patient, Rodrigo Lanier, to the [facility] UROLOG[facility] [place] at [facility]. Please see a copy of my visit note below.    Pt will meet you in [employer]    Rodrigo is a 68 year old who is being evaluated via a billable video visit.      How would you like to obtain your AVS? [facility]  If the video visit is dropped, the invitation should be resent by: Text to cell phone: [phone]  Will anyone else be joining your video visit? No        Video-Visit Details    Type of service:  Video Visit   Video Start Time: 10:35 AM  Video End Time:10:46 AM    Originating Location (pt. Location): Home    Distant Location (provider location):  Off-site  Platform used for Video Visit: [facility]        Chief Complaint:    Kidney/Ureteral Stone           Consult or Referral:     Mr. Rodrigo Lanier is a 68 year old male seen at the request of Dr. Tompkins.         History of Present Illness:    Rodrigo Lanier is a very pleasant 68 year old male who presents with a history of Kidney/Ureteral Stone.    Reviewed previous notes from Dr. Heike Wallace presents today to discuss a recent diagnosis of right kidney stones  He is totally asymptomatic was diagnosed about 6 months ago with multiple right kidney stones  No prior history of kidney stone passage  Reporting stone surgery           Past Medical History:     Past Medical History:   Diagnosis Date    CAD (coronary artery disease)     s/p CABG x4 in 11/2022 (LIMA to LAD, SVG to OM, SVG to right PDA, and SVG to diagonal    HTN (hypertension)     Mixed hyperlipidemia     Nicotine dependence     Type 2 diabetes mellitus (H)             Past Surgical History:     Past Surgical History:   Procedure Laterality Date    BYPASS GRAFT ARTERY CORONARY N/A 11/16/2022    Procedure: CORONARY ARTERY BYPASS GRAFT x 4 (LEFT  INTERNAL MAMMARY ARTERY - LEFT ANTERIOR DESCENDING ARTERY; SAPHENOUS VEIN - OBTUSE MARGINAL ARTERY; SAPHENOUS VEIN - RIGHT POSTERIOR DESCENDING ARTERY; SAPHENOUS VEIN - DIAGONAL ARTERY) WITH ENDOSCOPIC SAPHENOUS VEIN HARVEST ON THE LEFT LOWER EXTREMITY, AND ON CARDIOPULMONARY PUMP OXYGENATOR  (INTRAOPERATIVE TRANSESOPHAGEAL ECHOCARDIOGRAM BY ANESTHE    CV CORONARY ANGIOGRAM N/A 2022    Procedure: Coronary Angiogram;  Surgeon: Truong Morales MD;  Location:  HEART CARDIAC CATH LAB    CV INTRA AORTIC BALLOON N/A 2022    Procedure: Intraprocedure Aortic Balloon Pump Insertion;  Surgeon: Truong Morales MD;  Location: [facility] CARDIAC CATH LAB    ELBOW SURGERY  2002    FINGER SURGERY      thumb    LEG SURGERY      femur    STRABISMUS SURGERY              Medications     Current Outpatient Medications   Medication    acetaminophen (TYLENOL) 325 MG tablet    aspirin (ASA) 81 MG chewable tablet    lisinopril (ZESTRIL) 20 MG tablet    metoprolol succinate ER (TOPROL XL) 50 MG 24 hr tablet    rosuvastatin (CRESTOR) 40 MG tablet    metFORMIN (GLUCOPHAGE) 500 MG tablet     No current facility-administered medications for this visit.            Family History:     Family History   Problem Relation Age of Onset    CABG Mother 74    Diabetes Father     Coronary Artery Disease Father 55         at home from a presumed fatal myocardial infarction at age 55 years.    No Known Problems Brother     No Known Problems Brother     No Known Problems Brother     No Known Problems Brother     No Known Problems Brother     No Known Problems Brother     Cancer Brother     No Known Problems Sister     No Known Problems Sister     No Known Problems Sister     Arrhythmia No family hx of     Cardiomyopathy No family hx of     Glaucoma No family hx of     Macular Degeneration No family hx of             Social History:     Social History     Socioeconomic History    Marital status: Single     Spouse name: Not on  file    Number of children: Not on file    Years of education: Not on file    Highest education level: Not on file   Occupational History    Not on file   Tobacco Use    Smoking status: Former     Packs/day: 0.50     Years: 45.00     Pack years: 22.50     Types: Cigarettes     Start date:      Quit date: 11/15/2022     Years since quittin.6    Smokeless tobacco: Never   Substance and Sexual Activity    Alcohol use: Yes     Comment: 2-4 beers per month.    Drug use: Never    Sexual activity: Not on file   Other Topics Concern    Not on file   Social History Narrative    Not on file     Social Determinants of Health     Financial Resource Strain: Not on file   Food Insecurity: Not on file   Transportation Needs: Not on file   Physical Activity: Not on file   Stress: Not on file   Social Connections: Not on file   Intimate Partner Violence: Not on file   Housing Stability: Not on file            Allergies:   Patient has no known allergies.         Review of Systems:  From intake questionnaire     Skin: negative  Eyes: negative  Ears/Nose/Throat: negative  Respiratory: No shortness of breath, dyspnea on exertion, cough, or hemoptysis  Cardiovascular: No chest pain or palpitations  Gastrointestinal: negative; no nausea/vomiting, constipation or diarrhea  Genitourinary: as per HPI  Musculoskeletal: negative  Neurologic: negative  Psychiatric: negative  Hematologic/Lymphatic/Immunologic: negative  Endocrine: negative         Physical Exam:   This is a virtual visit    Alert, no acute distress, oriented, conversant    Ears/nose/mouth: mouth:normal, good dentition  Respiratory: no respiratory distress, or pursed lip breathing  Cardiovascular:no obvious jugular venous distension present  Skin: no suspicious lesions or rashes on Visible body parts on the Screen  Neuro: Alert, oriented, speech and mentation normal  Psych: affect and mood normal, alert and oriented to person, place and time      Labs and Pathology:     The following labs were reviewed by me and discussed with the patient:    Significant for   Lab Results   Component Value Date    CR 1.02 05/11/2023    CR 0.73 11/20/2022    CR 0.71 11/19/2022    CR 0.78 11/18/2022    CR 0.80 11/17/2022    CR 0.82 11/16/2022    CR 0.80 11/16/2022    CR 0.68 11/16/2022     Prostate Specific Antigen Screen   Date Value Ref Range Status   10/16/2015 0.9 0.0 - 4.5 ng/mL Final             Imaging:    The following imaging exams were independently viewed and interpreted by me and discussed with patient:  CT Scan Abd/Pelvis as a part of the CT aortic survey: Abnormal:   3 kidney stones in total on the right  Largest 1 measures 1.3  Cumulative size approximately 3.4 cm             Assessment and Plan:     Assessment:     Calculus of kidney  Multiple right kidney stones including kidney stones in the renal pelvis  Recommend repeat CT to be done  We will have him follow-up with urology partners to discuss possible PCNL  CT abdomen pelvis  Follow-up with Dr. Lucas and Dr. Hammond for discussion regarding PCNL  - Adult Urology [facility] Referral  - CT Abdomen Pelvis w/o Contrast; Future      Orders  Orders Placed This Encounter   Procedures    CT Abdomen Pelvis w/o Contrast         Felton Tran MD  [facility] [place]                  ==========================    Additional Billing and Coding Information:  Review of external notes as documented above   Review of the result(s) of each unique test - Creatinine    Independent interpretation of a test performed by another physician/other qualified health care professional (not separately reported) -   CT abdomen pelvis was reviewed and discussed with patient    Discussion of management or test interpretation with external physician/other qualified healthcare professional/appropriate source -           19 minutes spent by me on the date of the encounter doing chart review, review of test results, interpretation of tests,  patient visit and documentation     ==========================

## 2023-07-25 ENCOUNTER — ANCILLARY PROCEDURE (OUTPATIENT)
Dept: CT IMAGING | Facility: CLINIC | Age: 68
End: 2023-07-25
Attending: STUDENT IN AN ORGANIZED HEALTH CARE EDUCATION/TRAINING PROGRAM
Payer: MEDICARE

## 2023-07-25 DIAGNOSIS — N20.0 CALCULUS OF KIDNEY: ICD-10-CM

## 2023-07-25 PROCEDURE — 74176 CT ABD & PELVIS W/O CONTRAST: CPT | Mod: MG | Performed by: RADIOLOGY

## 2023-07-25 PROCEDURE — G1010 CDSM STANSON: HCPCS | Mod: GC | Performed by: RADIOLOGY

## 2023-07-26 ENCOUNTER — TELEPHONE (OUTPATIENT)
Dept: UROLOGY | Facility: CLINIC | Age: 68
End: 2023-07-26
Payer: MEDICARE

## 2023-07-26 NOTE — TELEPHONE ENCOUNTER
----- Message from Medina Keith sent at 7/25/2023  9:35 AM CDT -----  Follow-up with Dr Lucas/Dr Hammond to discuss PCNL    SK[facility]  7/24/23

## 2023-08-07 DIAGNOSIS — I25.10 CAD (CORONARY ARTERY DISEASE): Primary | ICD-10-CM

## 2023-08-07 RX ORDER — ASPIRIN 81 MG/1
81 TABLET, CHEWABLE ORAL DAILY
Qty: 90 TABLET | Refills: 3 | Status: SHIPPED | OUTPATIENT
Start: 2023-08-07

## 2023-08-07 RX ORDER — ASPIRIN 81 MG/1
81 TABLET, CHEWABLE ORAL DAILY
Status: CANCELLED | OUTPATIENT
Start: 2023-08-07

## 2023-10-11 DIAGNOSIS — I25.110 CORONARY ARTERY DISEASE INVOLVING NATIVE CORONARY ARTERY OF NATIVE HEART WITH UNSTABLE ANGINA PECTORIS (H): ICD-10-CM

## 2023-10-12 DIAGNOSIS — I25.110 CORONARY ARTERY DISEASE INVOLVING NATIVE CORONARY ARTERY OF NATIVE HEART WITH UNSTABLE ANGINA PECTORIS (H): ICD-10-CM

## 2023-10-12 RX ORDER — ROSUVASTATIN CALCIUM 40 MG/1
40 TABLET, COATED ORAL DAILY
Qty: 90 TABLET | Refills: 2 | Status: SHIPPED | OUTPATIENT
Start: 2023-10-12 | End: 2024-06-28

## 2023-10-12 RX ORDER — ROSUVASTATIN CALCIUM 40 MG/1
40 TABLET, COATED ORAL DAILY
Qty: 90 TABLET | Status: CANCELLED | OUTPATIENT
Start: 2023-10-12

## 2023-10-12 NOTE — TELEPHONE ENCOUNTER
rosuvastatin (CRESTOR) 40 MG tablet     Last Written Prescription Date:  11/16/2022  Last Fill Quantity: 90,   # refills: 3  Last Office Visit :  5/12/2023  Future Office visit:  None    Routing refill request to provider for review/approval because:  Last filled by [facility]  Please send new order to updated pharmacy for Pt care.  Thank you     Lauren Bardales RN  Central Triage Red Flags/Med Refills

## 2023-10-15 ENCOUNTER — HEALTH MAINTENANCE LETTER (OUTPATIENT)
Age: 68
End: 2023-10-15

## 2023-11-01 ENCOUNTER — PRE VISIT (OUTPATIENT)
Dept: UROLOGY | Facility: CLINIC | Age: 68
End: 2023-11-01
Payer: MEDICARE

## 2023-11-01 NOTE — TELEPHONE ENCOUNTER
Reason for visit: Discuss PCNL     Relevant information:   Hx Kidney/ureteral stones noted 11/15/22  Referred by Dr. Tompkins        Records/imaging/labs/orders:   CT Abdomen/Pelvis 7/25/23 results available in PACS      Pt called: Ofelia Frias LPN  11/1/2023  2:40 PM

## 2024-01-24 ENCOUNTER — OFFICE VISIT (OUTPATIENT)
Dept: OPHTHALMOLOGY | Facility: CLINIC | Age: 69
End: 2024-01-24
Attending: STUDENT IN AN ORGANIZED HEALTH CARE EDUCATION/TRAINING PROGRAM
Payer: COMMERCIAL

## 2024-01-24 DIAGNOSIS — H52.203 HYPEROPIA OF BOTH EYES WITH ASTIGMATISM AND PRESBYOPIA: ICD-10-CM

## 2024-01-24 DIAGNOSIS — E11.9 DIABETES MELLITUS TYPE 2 WITHOUT RETINOPATHY (H): Primary | ICD-10-CM

## 2024-01-24 DIAGNOSIS — H04.123 DRY EYES, BILATERAL: ICD-10-CM

## 2024-01-24 DIAGNOSIS — H52.03 HYPEROPIA OF BOTH EYES WITH ASTIGMATISM AND PRESBYOPIA: ICD-10-CM

## 2024-01-24 DIAGNOSIS — H52.4 HYPEROPIA OF BOTH EYES WITH ASTIGMATISM AND PRESBYOPIA: ICD-10-CM

## 2024-01-24 DIAGNOSIS — H25.13 NUCLEAR SCLEROTIC CATARACT OF BOTH EYES: ICD-10-CM

## 2024-01-24 DIAGNOSIS — H53.032 STRABISMIC AMBLYOPIA OF LEFT EYE: ICD-10-CM

## 2024-01-24 PROCEDURE — G0463 HOSPITAL OUTPT CLINIC VISIT: HCPCS | Performed by: STUDENT IN AN ORGANIZED HEALTH CARE EDUCATION/TRAINING PROGRAM

## 2024-01-24 PROCEDURE — 92014 COMPRE OPH EXAM EST PT 1/>: CPT | Performed by: STUDENT IN AN ORGANIZED HEALTH CARE EDUCATION/TRAINING PROGRAM

## 2024-01-24 PROCEDURE — 92015 DETERMINE REFRACTIVE STATE: CPT | Mod: GY

## 2024-01-24 ASSESSMENT — CONF VISUAL FIELD
OS_SUPERIOR_TEMPORAL_RESTRICTION: 0
OS_NORMAL: 1
METHOD: COUNTING FINGERS
OS_INFERIOR_TEMPORAL_RESTRICTION: 0
OS_INFERIOR_NASAL_RESTRICTION: 0
OS_SUPERIOR_NASAL_RESTRICTION: 0

## 2024-01-24 ASSESSMENT — VISUAL ACUITY
OD_PH_SC: 20/20-2
METHOD: SNELLEN - LINEAR
OD_SC: 20/40-

## 2024-01-24 ASSESSMENT — REFRACTION_MANIFEST
OD_ADD: +2.75
OD_AXIS: 092
OS_SPHERE: BALANCE
OD_CYLINDER: +0.50
OD_SPHERE: +0.50

## 2024-01-24 ASSESSMENT — SLIT LAMP EXAM - LIDS
COMMENTS: WNL
COMMENTS: WNL

## 2024-01-24 ASSESSMENT — TONOMETRY
OS_IOP_MMHG: 20
IOP_METHOD: TONOPEN
OD_IOP_MMHG: 17

## 2024-01-24 ASSESSMENT — CUP TO DISC RATIO
OS_RATIO: 0.1
OD_RATIO: 0.1

## 2024-01-24 ASSESSMENT — EXTERNAL EXAM - LEFT EYE: OS_EXAM: WNL

## 2024-01-24 ASSESSMENT — EXTERNAL EXAM - RIGHT EYE: OD_EXAM: WNL

## 2024-01-24 NOTE — PROGRESS NOTES
HPI       Eye Exam For Diabetes    In both eyes.  This started years ago.  Presenting in central vision.  Since onset it is stable.  Associated symptoms include dryness (over the last week his right eye has been watering a lot and he thinks it might be due to dryness, has not used any eye gtts) and tearing (OD).  Pain was noted as 0/10.             Comments    No new concerns or changes since LV other than those documented above.  Denies flashes/floaters.     Lab Results       Component                Value               Date                       A1C                      6.2                 05/11/2023                 A1C                      6.6                 11/16/2022                 A1C                      5.5                 10/16/2015         KAYLA Muir 10:06 AM 01/24/2024                     Last edited by Zoie Sandoval on 1/24/2024 10:06 AM.          Review of systems for the eyes was negative other than the pertinent positives/negatives listed in the HPI.    Ocular Meds: none    Ocular Hx: amblyopia left eye s/p strab surgery (x2) in childhood    FOHx: sister - strabismus; mother - strabismus    PMHx: +Diabetes; CAB s/p 4vCABG (Nov 2022)    Assessment & Plan      Rodrigo Lanier is a 68 year old male with the following diagnoses:    T2DM without Retinopathy OU  - Diagnosed: Nov 2022  - strict BP/BG control  - patient understands importance  of good blood glucose control in order to reduce the risk of developing diabetic retinopathy  - yearly DFE    Strabismic amblyopia of left eye  - patched inconsistently as a kid  - history of strab surgery  - vision at baseline  - monocular precautions advised including full time wear of polycarbonate lens glasses    Nuclear sclerotic cataract OU  - not affecting ADLs  - observe    Dry Eyes OU  - ATs prn OU    Hyperopia of both eyes with astigmatism and presbyopia  - refraction reviewed and dispensed today  - monocular precautions advised    Counseled  return/RD precautions    Patient disposition:   Return in about 1 year (around 1/24/2025) for Annual Visit, or sooner changes.      Attending Physician Attestation:  Complete documentation of historical and exam elements from today's encounter can be found in the full encounter summary report (not reduplicated in this progress note).  I personally obtained the chief complaint(s) and history of present illness.  I confirmed and edited as necessary the review of systems, past medical/surgical history, family history, social history, and examination findings as documented by others; and I examined the patient myself.  I personally reviewed the relevant tests, images, and reports as documented above.  I formulated and edited as necessary the assessment and plan and discussed the findings and management plan with the patient and family. . - Kylie Becerra MD

## 2024-03-03 ENCOUNTER — HEALTH MAINTENANCE LETTER (OUTPATIENT)
Age: 69
End: 2024-03-03

## 2024-05-10 ENCOUNTER — LAB (OUTPATIENT)
Dept: LAB | Facility: CLINIC | Age: 69
End: 2024-05-10
Payer: COMMERCIAL

## 2024-05-10 DIAGNOSIS — I25.10 CORONARY ARTERY DISEASE INVOLVING NATIVE CORONARY ARTERY OF NATIVE HEART WITHOUT ANGINA PECTORIS: ICD-10-CM

## 2024-05-10 DIAGNOSIS — E11.59 TYPE 2 DIABETES MELLITUS WITH OTHER CIRCULATORY COMPLICATION, WITHOUT LONG-TERM CURRENT USE OF INSULIN (H): ICD-10-CM

## 2024-05-10 DIAGNOSIS — E78.5 HYPERLIPIDEMIA LDL GOAL <70: ICD-10-CM

## 2024-05-10 DIAGNOSIS — I10 BENIGN ESSENTIAL HYPERTENSION: ICD-10-CM

## 2024-05-10 LAB
ERYTHROCYTE [DISTWIDTH] IN BLOOD BY AUTOMATED COUNT: 11.7 % (ref 10–15)
HBA1C MFR BLD: 7.2 % (ref 0–5.6)
HCT VFR BLD AUTO: 48.2 % (ref 40–53)
HGB BLD-MCNC: 16.3 G/DL (ref 13.3–17.7)
MCH RBC QN AUTO: 29.1 PG (ref 26.5–33)
MCHC RBC AUTO-ENTMCNC: 33.8 G/DL (ref 31.5–36.5)
MCV RBC AUTO: 86 FL (ref 78–100)
PLATELET # BLD AUTO: 205 10E3/UL (ref 150–450)
RBC # BLD AUTO: 5.6 10E6/UL (ref 4.4–5.9)
WBC # BLD AUTO: 8.5 10E3/UL (ref 4–11)

## 2024-05-10 PROCEDURE — 36415 COLL VENOUS BLD VENIPUNCTURE: CPT

## 2024-05-10 PROCEDURE — 80053 COMPREHEN METABOLIC PANEL: CPT

## 2024-05-10 PROCEDURE — 85027 COMPLETE CBC AUTOMATED: CPT

## 2024-05-10 PROCEDURE — 83036 HEMOGLOBIN GLYCOSYLATED A1C: CPT

## 2024-05-10 PROCEDURE — 80061 LIPID PANEL: CPT

## 2024-05-11 LAB
ALBUMIN SERPL BCG-MCNC: 4.7 G/DL (ref 3.5–5.2)
ALP SERPL-CCNC: 69 U/L (ref 40–150)
ALT SERPL W P-5'-P-CCNC: 25 U/L (ref 0–70)
ANION GAP SERPL CALCULATED.3IONS-SCNC: 12 MMOL/L (ref 7–15)
AST SERPL W P-5'-P-CCNC: 27 U/L (ref 0–45)
BILIRUB SERPL-MCNC: 0.6 MG/DL
BUN SERPL-MCNC: 18.7 MG/DL (ref 8–23)
CALCIUM SERPL-MCNC: 9.6 MG/DL (ref 8.8–10.2)
CHLORIDE SERPL-SCNC: 105 MMOL/L (ref 98–107)
CHOLEST SERPL-MCNC: 136 MG/DL
CREAT SERPL-MCNC: 1.01 MG/DL (ref 0.67–1.17)
DEPRECATED HCO3 PLAS-SCNC: 24 MMOL/L (ref 22–29)
EGFRCR SERPLBLD CKD-EPI 2021: 81 ML/MIN/1.73M2
FASTING STATUS PATIENT QL REPORTED: ABNORMAL
FASTING STATUS PATIENT QL REPORTED: NORMAL
GLUCOSE SERPL-MCNC: 139 MG/DL (ref 70–99)
HDLC SERPL-MCNC: 48 MG/DL
LDLC SERPL CALC-MCNC: 72 MG/DL
NONHDLC SERPL-MCNC: 88 MG/DL
POTASSIUM SERPL-SCNC: 4.2 MMOL/L (ref 3.4–5.3)
PROT SERPL-MCNC: 7.5 G/DL (ref 6.4–8.3)
SODIUM SERPL-SCNC: 141 MMOL/L (ref 135–145)
TRIGL SERPL-MCNC: 81 MG/DL

## 2024-05-14 ENCOUNTER — TELEPHONE (OUTPATIENT)
Dept: CARDIOLOGY | Facility: CLINIC | Age: 69
End: 2024-05-14
Payer: COMMERCIAL

## 2024-05-14 DIAGNOSIS — I25.110 CORONARY ARTERY DISEASE INVOLVING NATIVE CORONARY ARTERY OF NATIVE HEART WITH UNSTABLE ANGINA PECTORIS (H): Primary | ICD-10-CM

## 2024-05-14 DIAGNOSIS — I25.5 ISCHEMIC CARDIOMYOPATHY: ICD-10-CM

## 2024-05-14 DIAGNOSIS — I25.10 CORONARY ARTERY DISEASE INVOLVING NATIVE CORONARY ARTERY OF NATIVE HEART WITHOUT ANGINA PECTORIS: ICD-10-CM

## 2024-05-14 NOTE — TELEPHONE ENCOUNTER
Spoke with patient, he states his understanding is that the MRI is not covered by his insurance but maybe could be covered is a peer to peer is done.    Reviewed with patient that Dr. Salmeron prefers to do an echo. Patient is OK with that plan.    Will update scheduling and [facility] financial team.      1010 MRI appointment removed by scheduling team  Patient will call the [facility] to set up the echo before his July visit.

## 2024-05-14 NOTE — TELEPHONE ENCOUNTER
Received notification of denial for MRI scheduled 5/16/2024  Message to  Financial team to review echo from 2022 showing WMA, CABG for repair of this issue and Dr. Salmeron's orders in 2023 to review MRI for changes.    Awaiting reply from  Financial  Update: have to do a Peer to peer if MRI is still wanted.      1020  Message from Dr. Jaron Salmeron, Marissa Guy MD  [facility] Heart Team 2    Please order complete echocardiogram.  Indication ischemic cardiomyopathy and coronary artery disease.    Thanks.  SJ    Attempted to contact the patient with update that his insurance will not cover the MRI at this time.  Order will be placed for an echo instead and scheduling will be calling find a new date for that.   Asked patient to call back so we know that he is aware to not come in for the MRI and that an echo will be set up instead.  Left message for patient to call back to Team 2 R.N.s @ [phone]     Order placed for echo  Message sent to scheduling team

## 2024-05-14 NOTE — TELEPHONE ENCOUNTER
Message from  Financial team:  Central [facility] Denial Notification     Patient Name:                        Rodrigo Lanier  :                                       1955  MRN:                                       [id number]     Dr. Salmeron ,     The authorization for procedure MR CARDIAC  on date of service 2024 is Unable to authorize at this time  by the patient's insurance for the following reason:     Denial Reason:       We cannot approve this request because:  Documentation of one of the following is required.   -Significant valvular disease (a flap that controls the flow of blood in the heart has   damage or is diseased) that may require intervention.      -Intracardiac flow disturbances (blood flow within the heart is abnormal due to a defect in the walls of the heart)We did not receive unclear results of imaging   (echocardiogram).     Clinicals Provided  Order:                          Order for MR Cardiac  Visit Notes:                  OV 24, OV 22  Results:                       MR Cardiac 23     A [id number] review can be done by calling the insurance/third party authorization vendor with the following information:     Insurance:MAR[employer]  Auth Vendor:   [employer]  Phone #:[phone],  Due Date:ASAP ---Patients appointment is on May 16th, 2024     Patient ID:       [id number]   Case/Ref #:      [id number].

## 2024-05-14 NOTE — TELEPHONE ENCOUNTER
Message from scheduling team:  Pt called insurance and they said they are covering it and he only has to pay a copay of $130   He is asking to not cancel this!     1100 attempted to contact the patient to verify/clarify this information. Left a message that we can leave the appointment in place for now, but  financial needs an update from his insurance that this is covered after all.    Message sent to  Financial team to verify the coverage is in place

## 2024-05-14 NOTE — TELEPHONE ENCOUNTER
[facility] Call Center    Phone Message    May a detailed message be left on voicemail: yes     Reason for Call: Other: Pt wanted to speak with care team wanting to know if care team could speak with his insurance medical director, please call pt back for further discussion.     Action Taken: Other: cardiology    Travel Screening: Not Applicable    Thank you!  [facility]

## 2024-06-11 ENCOUNTER — TELEPHONE (OUTPATIENT)
Dept: CARDIOLOGY | Facility: CLINIC | Age: 69
End: 2024-06-11
Payer: COMMERCIAL

## 2024-06-11 DIAGNOSIS — I25.10 CORONARY ARTERY DISEASE INVOLVING NATIVE HEART WITHOUT ANGINA PECTORIS, UNSPECIFIED VESSEL OR LESION TYPE: ICD-10-CM

## 2024-06-11 DIAGNOSIS — Z95.1 S/P CABG (CORONARY ARTERY BYPASS GRAFT): ICD-10-CM

## 2024-06-11 DIAGNOSIS — I10 BENIGN ESSENTIAL HYPERTENSION: Primary | ICD-10-CM

## 2024-06-11 RX ORDER — LISINOPRIL 20 MG/1
20 TABLET ORAL 2 TIMES DAILY
COMMUNITY
Start: 2024-06-11 | End: 2024-07-23

## 2024-06-11 NOTE — TELEPHONE ENCOUNTER
[facility] Call Center    Phone Message    May a detailed message be left on voicemail: yes     Reason for Call: Symptoms or Concerns     If patient has red-flag symptoms, warm transfer to triage line    Current symptom or concern: Last Friday 6/7 BP:160-180/100, no discomfort or symptoms  The patient had a home care visit last Friday 6/7 and the nurse said the patient's BP was high and he should inform his cardiologist.  This is through his insurance [employer] and thinks it is called [employer]      Action Taken: Other: Cardiology    Travel Screening: Not Applicable    Thank you!  [facility]       Date of Service:

## 2024-06-11 NOTE — TELEPHONE ENCOUNTER
Called patient. He states he had an insurance check up and his BP at the RN visit was elevated. He also take his own BP and since Friday his readings at home are also up: 173/90 is the average.    Patient notes he has been sedentary and also enjoying a higher sodium-level diet. He knows he needs to work on this. He has had no weight gain, edema or headaches with the elevated readings.    Patient is taking lisinopril 20mg qPM and toprol XL 50mg qAM.    Patient is scheduled for an echo on 6/24/2024  Patient to see PCP on 7/3/2024 (labs done in May)  Patient to see Dr. Salmeron on 7/26/2024    Will message Dr. Salmeron to review

## 2024-06-11 NOTE — TELEPHONE ENCOUNTER
Reply from Dr. Salmeron:  Marissa Salmeron MD Anderson, Pari SOTO RN  Cc: AKIRA Melchor [facility] 2  Caller: Unspecified (Today,  1:21 PM)  1.  Cut back sodium intake.  2.  Try to walk every day.  3.  Increase lisinopril to 20 mg 2 times daily (take at 8 AM and 6 PM).  4.  Continue current dose of metoprolol XL.  5.  Follow-up with his PCP, as scheduled.    Thanks.  Dr. Salmeron      [id number] called patient to discuss Dr. Salmeron's recommendations. He states he has enough pills for now to go to BID dosing. He will call when he needs a new script sent in.  Patient verbalized understanding and agreed with plan.

## 2024-06-24 ENCOUNTER — HOSPITAL ENCOUNTER (OUTPATIENT)
Dept: CARDIOLOGY | Facility: CLINIC | Age: 69
Discharge: HOME | End: 2024-06-24
Attending: INTERNAL MEDICINE | Admitting: INTERNAL MEDICINE
Payer: COMMERCIAL

## 2024-06-24 DIAGNOSIS — I25.110 CORONARY ARTERY DISEASE INVOLVING NATIVE CORONARY ARTERY OF NATIVE HEART WITH UNSTABLE ANGINA PECTORIS (H): ICD-10-CM

## 2024-06-24 DIAGNOSIS — I25.5 ISCHEMIC CARDIOMYOPATHY: ICD-10-CM

## 2024-06-24 LAB — LVEF ECHO: NORMAL

## 2024-06-24 PROCEDURE — 93306 TTE W/DOPPLER COMPLETE: CPT | Mod: 26 | Performed by: INTERNAL MEDICINE

## 2024-06-24 PROCEDURE — 93306 TTE W/DOPPLER COMPLETE: CPT

## 2024-06-28 DIAGNOSIS — I25.110 CORONARY ARTERY DISEASE INVOLVING NATIVE CORONARY ARTERY OF NATIVE HEART WITH UNSTABLE ANGINA PECTORIS (H): ICD-10-CM

## 2024-06-28 RX ORDER — ROSUVASTATIN CALCIUM 40 MG/1
40 TABLET, COATED ORAL DAILY
Qty: 90 TABLET | Refills: 0 | Status: SHIPPED | OUTPATIENT
Start: 2024-06-28 | End: 2024-07-26

## 2024-07-03 ENCOUNTER — OFFICE VISIT (OUTPATIENT)
Dept: INTERNAL MEDICINE | Facility: CLINIC | Age: 69
End: 2024-07-03
Payer: COMMERCIAL

## 2024-07-03 VITALS
BODY MASS INDEX: 23.58 KG/M2 | TEMPERATURE: 97.4 F | OXYGEN SATURATION: 97 % | SYSTOLIC BLOOD PRESSURE: 136 MMHG | RESPIRATION RATE: 14 BRPM | DIASTOLIC BLOOD PRESSURE: 72 MMHG | WEIGHT: 155.6 LBS | HEART RATE: 60 BPM | HEIGHT: 68 IN

## 2024-07-03 DIAGNOSIS — N20.0 NEPHROLITHIASIS: ICD-10-CM

## 2024-07-03 DIAGNOSIS — E11.59 TYPE 2 DIABETES MELLITUS WITH OTHER CIRCULATORY COMPLICATION, WITHOUT LONG-TERM CURRENT USE OF INSULIN (H): Primary | ICD-10-CM

## 2024-07-03 PROCEDURE — 82043 UR ALBUMIN QUANTITATIVE: CPT | Performed by: INTERNAL MEDICINE

## 2024-07-03 PROCEDURE — 99214 OFFICE O/P EST MOD 30 MIN: CPT | Performed by: INTERNAL MEDICINE

## 2024-07-03 PROCEDURE — 82570 ASSAY OF URINE CREATININE: CPT | Performed by: INTERNAL MEDICINE

## 2024-07-03 PROCEDURE — G2211 COMPLEX E/M VISIT ADD ON: HCPCS | Performed by: INTERNAL MEDICINE

## 2024-07-03 RX ORDER — RESPIRATORY SYNCYTIAL VIRUS VACCINE 120MCG/0.5
0.5 KIT INTRAMUSCULAR ONCE
Qty: 1 EACH | Refills: 0 | Status: CANCELLED | OUTPATIENT
Start: 2024-07-03 | End: 2024-07-03

## 2024-07-03 ASSESSMENT — PAIN SCALES - GENERAL: PAINLEVEL: NO PAIN (0)

## 2024-07-03 NOTE — PROGRESS NOTES
Assessment & Plan     (E11.59) Type 2 diabetes mellitus with other circulatory complication, without long-term current use of insulin (H)  (primary encounter diagnosis)  Comment: with known associated coronary disease. A1c of 7.2% recently. Off metformin since last  year. Per notes, was hesitant to start new medication though he denies this prior conversation with cardiology  Plan: Albumin Random Urine Quantitative with Creat         Ratio, empagliflozin (JARDIANCE) 10 MG TABS         tablet        He is okay to try Jardiance. Recheck in 4 months.    (N20.0) Nephrolithiasis  Comment: hx of, CT scan last year did not get a follow up with urology (insurance problems apparently)  Plan: Adult Urology  Referral        Referral placed again today.    See me this fall    The longitudinal plan of care for the diagnosis(es)/condition(s) as documented were addressed during this visit. Due to the added complexity in care, I will continue to support Rodrigo in the subsequent management and with ongoing continuity of care.                 Mikhail Wallace is a 68 year old, presenting for the following health issues:  Multiple Concerns (- Diabetes /- High Cholesterol/- High Blood Pressure/- Heart or Circulation Problems//), Kidney Problem (Pt would like to discuss kidney stone, pt was informed of this in 2022 at time of surgery. Pt denies pain ), and Recheck Medication (Pt would like to discuss Metroprolol)        7/3/2024    10:44 AM   Additional Questions   Roomed by Celestina     History of Present Illness       Diabetes:   He presents for follow up of diabetes.    He is not checking blood glucose.         He has no concerns regarding his diabetes at this time.   He is not experiencing numbness or burning in feet, excessive thirst, blurry vision, weight changes or redness, sores or blisters on feet.           Hyperlipidemia:  He presents for follow up of hyperlipidemia.   He is taking medication to lower  "cholesterol. He is not having myalgia or other side effects to statin medications.    Hypertension: He presents for follow up of hypertension.  He does check blood pressure  regularly outside of the clinic. Outside blood pressures have been over 140/90. He follows a low salt diet.     Vascular Disease:  He presents for follow up of vascular disease.     He never takes nitroglycerin. He takes daily aspirin.    Reason for visit:  Annual check up    He eats 2-3 servings of fruits and vegetables daily.He consumes 2 sweetened beverage(s) daily.He exercises with enough effort to increase his heart rate 20 to 29 minutes per day.    He is taking medications regularly.                     Objective    /72 (BP Location: Left arm, Patient Position: Sitting, Cuff Size: Adult Regular)   Pulse 60   Temp 97.4  F (36.3  C) (Tympanic)   Resp 14   Ht 1.727 m (5' 7.99\")   Wt 70.6 kg (155 lb 9.6 oz)   SpO2 97%   BMI 23.66 kg/m    Body mass index is 23.66 kg/m .  Physical Exam               Signed Electronically by: Armando Tompkins MD    "

## 2024-07-04 LAB
CREAT UR-MCNC: 185 MG/DL
MICROALBUMIN UR-MCNC: 147 MG/L
MICROALBUMIN/CREAT UR: 79.46 MG/G CR (ref 0–17)

## 2024-07-08 NOTE — PROGRESS NOTES
CV Surgery    S: Sitting up in bed, breathing stable, tolerating diet, working with rehab, ready to discharge home    O: B/P: 123/81, T: 98.9, P: 93, R: 16  General: NAD  Heart: s1/s2, no m/r/g  Lungs: course  Abd: s/nt/nd  Sternum: cdi  Extremities: no LE edema    Lab Results   Component Value Date    WBC 11.3 11/20/2022     Lab Results   Component Value Date    RBC 3.24 11/20/2022     Lab Results   Component Value Date    HGB 9.6 11/20/2022     Lab Results   Component Value Date    HCT 28.9 11/20/2022     No components found for: MCT  Lab Results   Component Value Date    MCV 89 11/20/2022     Lab Results   Component Value Date    MCH 29.6 11/20/2022     Lab Results   Component Value Date    MCHC 33.2 11/20/2022     Lab Results   Component Value Date    RDW 12.3 11/20/2022     Lab Results   Component Value Date     11/20/2022       Last Basic Metabolic Panel:  Lab Results   Component Value Date     11/19/2022      Lab Results   Component Value Date    POTASSIUM 3.3 11/19/2022     Lab Results   Component Value Date    CHLORIDE 105 11/19/2022     Lab Results   Component Value Date    LINDA 8.6 11/19/2022     Lab Results   Component Value Date    CO2 23 11/19/2022     Lab Results   Component Value Date    BUN 27 11/19/2022     Lab Results   Component Value Date    CR 0.71 11/19/2022     Lab Results   Component Value Date     11/20/2022     11/19/2022       A/P: POD#4 s/p Coronary artery bypass grafting x 4   -reversed saphenous vein graft to the right posterior descending coronary artery  -reversed saphenous vein graft to the obtuse marginal branch of the left circumflex coronary artery  -reversed saphenous vein graft to the second diagonal branch of the left anterior descending coronary artery  -pedicled left internal mammary artery to left anterior descending coronary artery).  2.  Endoscopic vein harvest of the greater saphenous vein from the left lower extremity. on 11/16/22 with   Sebastian Monaco PA-C  Pager [phone]     [Fully active, able to carry on all pre-disease performance without restriction] : Status 0 - Fully active, able to carry on all pre-disease performance without restriction [Normal] : grossly intact [de-identified] : NO pain on palpation Detail Level: Detailed Quality 226: Preventive Care And Screening: Tobacco Use: Screening And Cessation Intervention: Patient screened for tobacco use and is an ex/non-smoker

## 2024-07-20 ENCOUNTER — HEALTH MAINTENANCE LETTER (OUTPATIENT)
Age: 69
End: 2024-07-20

## 2024-07-23 DIAGNOSIS — I25.10 CORONARY ARTERY DISEASE INVOLVING NATIVE HEART WITHOUT ANGINA PECTORIS, UNSPECIFIED VESSEL OR LESION TYPE: ICD-10-CM

## 2024-07-23 DIAGNOSIS — I10 BENIGN ESSENTIAL HYPERTENSION: ICD-10-CM

## 2024-07-23 RX ORDER — LISINOPRIL 20 MG/1
20 TABLET ORAL 2 TIMES DAILY
Qty: 180 TABLET | Refills: 0 | Status: SHIPPED | OUTPATIENT
Start: 2024-07-23 | End: 2024-07-26

## 2024-07-26 ENCOUNTER — VIRTUAL VISIT (OUTPATIENT)
Dept: CARDIOLOGY | Facility: CLINIC | Age: 69
End: 2024-07-26
Attending: INTERNAL MEDICINE
Payer: COMMERCIAL

## 2024-07-26 VITALS
WEIGHT: 151 LBS | DIASTOLIC BLOOD PRESSURE: 91 MMHG | HEIGHT: 68 IN | SYSTOLIC BLOOD PRESSURE: 152 MMHG | BODY MASS INDEX: 22.88 KG/M2

## 2024-07-26 DIAGNOSIS — E11.59 TYPE 2 DIABETES MELLITUS WITH OTHER CIRCULATORY COMPLICATION, WITHOUT LONG-TERM CURRENT USE OF INSULIN (H): ICD-10-CM

## 2024-07-26 DIAGNOSIS — Z95.1 S/P CABG (CORONARY ARTERY BYPASS GRAFT): ICD-10-CM

## 2024-07-26 DIAGNOSIS — E78.00 PURE HYPERCHOLESTEROLEMIA: ICD-10-CM

## 2024-07-26 DIAGNOSIS — I25.10 CORONARY ARTERY DISEASE INVOLVING NATIVE CORONARY ARTERY OF NATIVE HEART WITHOUT ANGINA PECTORIS: Primary | ICD-10-CM

## 2024-07-26 DIAGNOSIS — I25.5 ISCHEMIC CARDIOMYOPATHY: ICD-10-CM

## 2024-07-26 PROCEDURE — G2211 COMPLEX E/M VISIT ADD ON: HCPCS | Mod: 95 | Performed by: INTERNAL MEDICINE

## 2024-07-26 PROCEDURE — 99215 OFFICE O/P EST HI 40 MIN: CPT | Mod: 95 | Performed by: INTERNAL MEDICINE

## 2024-07-26 RX ORDER — ASPIRIN 81 MG/1
81 TABLET, CHEWABLE ORAL DAILY
Qty: 100 TABLET | Refills: 6 | Status: CANCELLED | OUTPATIENT
Start: 2024-07-26

## 2024-07-26 RX ORDER — LISINOPRIL 20 MG/1
20 TABLET ORAL 2 TIMES DAILY
Qty: 200 TABLET | Refills: 6 | Status: SHIPPED | OUTPATIENT
Start: 2024-07-26

## 2024-07-26 RX ORDER — METOPROLOL SUCCINATE 50 MG/1
50 TABLET, EXTENDED RELEASE ORAL EVERY MORNING
Qty: 100 TABLET | Refills: 6 | Status: SHIPPED | OUTPATIENT
Start: 2024-07-26

## 2024-07-26 RX ORDER — ROSUVASTATIN CALCIUM 40 MG/1
40 TABLET, COATED ORAL DAILY
Qty: 100 TABLET | Refills: 6 | Status: SHIPPED | OUTPATIENT
Start: 2024-07-26

## 2024-07-26 NOTE — PROGRESS NOTES
SERVICE DATE: July 26, 2024      PRIMARY CARE PROVIDER:  Armando Tompkins  [address] [place] [address]    REASON FOR VISIT:  Status post CABG, hyperlipidemia, hypertension, ischemic cardiomyopathy.    HISTORY OF PRESENT ILLNESS:  Rodrigo Lanier is 69 year old male, known to have coronary artery disease presenting as NSTEMI in November 2022, found to have triple-vessel coronary artery disease and ischemic cardiomyopathy with LVEF of 50% with inferior wall motion abnormalities, new diagnosis of type 2 diabetes mellitus, new diagnosis of hypertension and hyperlipidemia (in the context of not previously having sought medical care for many years), former tobacco use. He underwent coronary artery bypass grafting on 11/16/2022 by Dr. Paolo Quinonez with a LIMA to the LAD, vein graft to obtuse marginal, right posterior descending artery and diagonal arteries.  Successful CABG, no intraoperative or postoperative complications.     Remains cardiovascularly asymptomatic, takes medications regularly, BP well-controlled at 136/72, pulse 60 bpm, no recurrence of angina.  Says he has not been exercising regularly but diagnosed tend to walk a few times a week.    Labs reviewed.  LDL 72 on 40 mg of rosuvastatin, HDL 48, normal triglycerides of 81, HbA1c 7.8% (on Jardiance), normal CBC with a hemoglobin of 16.3, normal liver panel, potassium 4.2, creatinine 1.0, estimated GFR 81.      Recent echocardiogram shows normal LV size, mildly reduced left ventricular systolic function with LVEF of 45% with inferior hypokinesis, right ventricle not well-visualized, no significant valve disease.    DIAGNOSES/ASSESSMENT:  Triple-vessel coronary artery disease status post CABG in November 2022, without angina.  Quit smoking years ago, BP well-controlled, HDL 72, HbA1c 7.2%.    Ischemic cardiomyopathy, NYHA class I, LVEF 45% with inferior wall motion abnormalities.  Already on full dose lisinopril, maximally tolerated metoprolol  "XL and Jardiance.  Type 2 diabetes mellitus on Jardiance.  HbA1c 7.2%.  Consider addition of metformin.  Hyperlipidemia with LDL at 72 on 40 mg of rosuvastatin.  After shared decision making, he wants to trial a regular exercise program to get his LDL even further before addition of ezetimibe.    PLAN:  Continue current cardiac medications.  Advised him to see his PCP for addition of metformin to Jardiance to bring his HbA1c to less than 7%.  Advised him to exercise regularly.  In 1 year, cardiac MRI, labs and follow-up with cardiology MURPHY.  If LVEF remains reduced, switch from lisinopril to Entresto. If LDL remains above 70, add ezetimibe/PCSK-9 inhibitor.        Marissa Salmeron MD      Established patient.   40 minutes spent by me on the date of the encounter doing chart review, history and exam, documentation and further activities per the note      The longitudinal plan of care for the condition(s) below were addressed during this visit. Due to the added complexity in care, I will continue to support Rodrigo in the subsequent management of this condition(s) and with the ongoing continuity of care of this condition(s).  Coronary artery disease, ischemic cardiomyopathy.      This note was completed in part using dictation via the Dragon voice recognition software. Some word and grammatical errors may occur and must be interpreted in the appropriate clinical context. If there are any questions pertaining to this issue, please contact me for further clarification.       Vitals: BP (!) 152/91   Ht 1.727 m (5' 7.99\")   Wt 68.5 kg (151 lb)   BMI 22.96 kg/m    Wt Readings from Last 5 Encounters:   07/26/24 68.5 kg (151 lb)   07/03/24 70.6 kg (155 lb 9.6 oz)   07/24/23 66.2 kg (146 lb)   05/24/23 67.5 kg (148 lb 12.8 oz)   05/12/23 68 kg (150 lb)         Orders Placed This Encounter   Procedures    Comprehensive metabolic panel    Lipid Profile    Follow-Up with Cardiology MURPHY           CURRENT " MEDICATIONS:  Current Outpatient Medications   Medication Sig Dispense Refill    aspirin (ASA) 81 MG chewable tablet Take 1 tablet (81 mg) by mouth daily 90 tablet 3    empagliflozin (JARDIANCE) 10 MG TABS tablet Take 1 tablet (10 mg) by mouth daily 90 tablet 1    lisinopril (ZESTRIL) 20 MG tablet Take 1 tablet (20 mg) by mouth 2 times daily 200 tablet 6    metoprolol succinate ER (TOPROL XL) 50 MG 24 hr tablet Take 1 tablet (50 mg) by mouth every morning 100 tablet 6    rosuvastatin (CRESTOR) 40 MG tablet Take 1 tablet (40 mg) by mouth daily 100 tablet 6         ALLERGIES:  No Known Allergies

## 2024-07-26 NOTE — PROGRESS NOTES
Rodrigo is a 69 year old who is being evaluated via a billable video visit.    How would you like to obtain your AVS? MyChart  If the video visit is dropped, the invitation should be resent by: Text to cell phone: [phone]  Will anyone else be joining your video visit? No    Video-Visit Details    Vitals - Patient Reported  Systolic (Patient Reported): (!) 152  Diastolic (Patient Reported): (!) 91  Weight (Patient Reported): 68.5 kg (151 lb)    MYA Ness    Type of service:  Video Visit JARRELL  Originating Location (pt. Location): Home  Distant Location (provider location):  On-site  Platform used for Video Visit: Jarrell

## 2024-07-26 NOTE — LETTER
7/26/2024    Armando Tompkins MD  [address] [place] [address]    RE: Rodrigo Lanier       Dear Colleague,     I had the pleasure of seeing Rodrigo Lanier in the [facility].  Rodrigo is a 69 year old who is being evaluated via a billable video visit.    How would you like to obtain your AVS? MyChart  If the video visit is dropped, the invitation should be resent by: Text to cell phone: [phone]  Will anyone else be joining your video visit? No    Video-Visit Details    Vitals - Patient Reported  Systolic (Patient Reported): (!) 152  Diastolic (Patient Reported): (!) 91  Weight (Patient Reported): 68.5 kg (151 lb)    MYA Ness    [facility]:  [facility]  Originating Location (pt. Location): Home  Distant Location (provider location):  On-site  Platform used for Video Visit: [facility]      SERVICE DATE: July 26, 2024      PRIMARY CARE PROVIDER:  Armando Tompkins  [address] [place] [address]    REASON FOR VISIT:  Status post CABG, hyperlipidemia, hypertension, ischemic cardiomyopathy.    HISTORY OF PRESENT ILLNESS:  Rodrigo Lanier is 69 year old male, known to have coronary artery disease presenting as NSTEMI in November 2022, found to have triple-vessel coronary artery disease and ischemic cardiomyopathy with LVEF of 50% with inferior wall motion abnormalities, new diagnosis of type 2 diabetes mellitus, new diagnosis of hypertension and hyperlipidemia (in the context of not previously having sought medical care for many years), former tobacco use. He underwent coronary artery bypass grafting on 11/16/2022 by Dr. Paolo Quinonez with a LIMA to the LAD, vein graft to obtuse marginal, right posterior descending artery and diagonal arteries.  Successful CABG, no intraoperative or postoperative complications.     Remains cardiovascularly asymptomatic, takes medications regularly, BP well-controlled at 136/72, pulse 60 bpm, no recurrence of angina.   Says he has not been exercising regularly but diagnosed tend to walk a few times a week.    Labs reviewed.  LDL 72 on 40 mg of rosuvastatin, HDL 48, normal triglycerides of 81, HbA1c 7.8% (on Jardiance), normal CBC with a hemoglobin of 16.3, normal liver panel, potassium 4.2, creatinine 1.0, estimated GFR 81.      Recent echocardiogram shows normal LV size, mildly reduced left ventricular systolic function with LVEF of 45% with inferior hypokinesis, right ventricle not well-visualized, no significant valve disease.    DIAGNOSES/ASSESSMENT:  Triple-vessel coronary artery disease status post CABG in November 2022, without angina.  Quit smoking years ago, BP well-controlled, HDL 72, HbA1c 7.2%.    Ischemic cardiomyopathy, NYHA class I, LVEF 45% with inferior wall motion abnormalities.  Already on full dose lisinopril, maximally tolerated metoprolol XL and Jardiance.  Type 2 diabetes mellitus on Jardiance.  HbA1c 7.2%.  Consider addition of metformin.  Hyperlipidemia with LDL at 72 on 40 mg of rosuvastatin.  After shared decision making, he wants to trial a regular exercise program to get his LDL even further before addition of ezetimibe.    PLAN:  Continue current cardiac medications.  Advised him to see his PCP for addition of metformin to Jardiance to bring his HbA1c to less than 7%.  Advised him to exercise regularly.  In 1 year, cardiac MRI, labs and follow-up with cardiology MURPHY.  If LVEF remains reduced, switch from lisinopril to Entresto. If LDL remains above 70, add ezetimibe/PCSK-9 inhibitor.        Marissa Salmeron MD      Established patient.   40 minutes spent by me on the date of the encounter doing chart review, history and exam, documentation and further activities per the note      The longitudinal plan of care for the condition(s) below were addressed during this visit. Due to the added complexity in care, I will continue to support Rodrigo in the subsequent management of this condition(s) and with  "the ongoing continuity of care of this condition(s).  Coronary artery disease, ischemic cardiomyopathy.      This note was completed in part using dictation via the Dragon voice recognition software. Some word and grammatical errors may occur and must be interpreted in the appropriate clinical context. If there are any questions pertaining to this issue, please contact me for further clarification.       Vitals: BP (!) 152/91   Ht 1.727 m (5' 7.99\")   Wt 68.5 kg (151 lb)   BMI 22.96 kg/m    Wt Readings from Last 5 Encounters:   07/26/24 68.5 kg (151 lb)   07/03/24 70.6 kg (155 lb 9.6 oz)   07/24/23 66.2 kg (146 lb)   05/24/23 67.5 kg (148 lb 12.8 oz)   05/12/23 68 kg (150 lb)         Orders Placed This Encounter   Procedures    Comprehensive metabolic panel    Lipid Profile    Follow-Up with Cardiology MURPHY           CURRENT MEDICATIONS:  Current Outpatient Medications   Medication Sig Dispense Refill    aspirin (ASA) 81 MG chewable tablet Take 1 tablet (81 mg) by mouth daily 90 tablet 3    empagliflozin (JARDIANCE) 10 MG TABS tablet Take 1 tablet (10 mg) by mouth daily 90 tablet 1    lisinopril (ZESTRIL) 20 MG tablet Take 1 tablet (20 mg) by mouth 2 times daily 200 tablet 6    metoprolol succinate ER (TOPROL XL) 50 MG 24 hr tablet Take 1 tablet (50 mg) by mouth every morning 100 tablet 6    rosuvastatin (CRESTOR) 40 MG tablet Take 1 tablet (40 mg) by mouth daily 100 tablet 6         ALLERGIES:  No Known Allergies              Thank you for allowing me to participate in the care of your patient.      Sincerely,     Marissa Salmeron MD     [facility]  cc:   Marissa Salmeron MD  [address] [place] [id number]      "

## 2024-08-12 ENCOUNTER — VIRTUAL VISIT (OUTPATIENT)
Dept: UROLOGY | Facility: CLINIC | Age: 69
End: 2024-08-12
Payer: COMMERCIAL

## 2024-08-12 VITALS — HEIGHT: 68 IN | WEIGHT: 152 LBS | BODY MASS INDEX: 23.04 KG/M2

## 2024-08-12 DIAGNOSIS — N20.0 NEPHROLITHIASIS: ICD-10-CM

## 2024-08-12 PROCEDURE — 99214 OFFICE O/P EST MOD 30 MIN: CPT | Mod: GT | Performed by: UROLOGY

## 2024-08-12 ASSESSMENT — PAIN SCALES - GENERAL: PAINLEVEL: NO PAIN (0)

## 2024-08-12 NOTE — NURSING NOTE
Current patient location: [address] [place], [place] [address]  [place] [address]    Is the patient currently in the state of [place]? YES    Visit mode:VIDEO    If the visit is dropped, the patient can be reconnected by: VIDEO VISIT: Text to cell phone:   Telephone Information:   Mobile [phone]       Will anyone else be joining the visit? NO  (If patient encounters technical issues they should call [phone])    How would you like to obtain your AVS? MyChart    Are changes needed to the allergy or medication list? No    Are refills needed on medications prescribed by this physician? NO    Rooming Documentation:  Assigned questionnaire(s) completed      Reason for visit: RECHECK    Li MUNSONF

## 2024-08-12 NOTE — PROGRESS NOTES
Virtual Visit Details    Type of service:  Video Visit   Video Start Time: 12:38 PM  Video End Time:12:50 PM    Originating Location (pt. Location): Home    Distant Location (provider location):  On-site  Platform used for Video Visit: Catarino    Assessment & Plan   ASSESSMENT and PLAN  69 year old male here for reevaluation of right nephrolithiasis.  Patient is not anxious to remove the stones.    1.  Large volume right nephrolithiasis    We discussed observation, shock wave lithotripsy, ureteroscopy and percutaneous nephrolithotomy as the main surgical approaches to upper urinary tract stones.  We reviewed risks and benefits including but not limited to the following: bleeding, infection, damage to adjacent organs, ureteral stricture, need for staged treatment, incomplete stone removal.    Ultimately the patient has elected to proceed with interval imaging and will decide on stone removal if there is hydronephrosis, stone growth or other indications based on imaging.  Patient is interested in pursuing metabolic evaluation otherwise.     Plan:  -Noncontrast low-dose CT scan of the abdomen with follow-up visit    Maynor Lucas MD   Urology  [facility]         Subjective     CHIEF COMPLAINT   follow-up of right nephrolithiasis.      HPI   Rodrigo Lanier is a very pleasant 69 year old male who presents with a history of right nephrolithiasis here for follow-up.    Since last visit, no pain or hematuria over the last year.    No personal history of stones.  Oldest son just passed a stone.    Hx of hypertension and diabetes.    No prior urologic history.    Imaging:  I reviewed CT scan of the abdomen pelvis from July 25, 2023 which demonstrated multiple right renal stones, largest being about 12 mm at the left UPJ without hydronephrosis.  Another prominent stones about a centimeter.  No left nephrolithiasis.    Labs:   Latest Reference Range & Units 05/10/24 10:36   Creatinine 0.67 - 1.17 mg/dL  "1.01          Objective     PHYSICAL EXAM  Patient is a 69 year old  male   Vitals: Height 1.727 m (5' 8\"), weight 68.9 kg (152 lb).  Body mass index is 23.11 kg/m .  No acute distress         "

## 2024-08-29 ENCOUNTER — TELEPHONE (OUTPATIENT)
Dept: INTERNAL MEDICINE | Facility: CLINIC | Age: 69
End: 2024-08-29
Payer: COMMERCIAL

## 2024-08-29 NOTE — TELEPHONE ENCOUNTER
[employer]     Please contact patient to assist with scheduling AWV (Last AWV 05/2023).    Please update spreadsheet with appointment date once scheduled.

## 2024-09-27 DIAGNOSIS — E11.59 TYPE 2 DIABETES MELLITUS WITH OTHER CIRCULATORY COMPLICATION, WITHOUT LONG-TERM CURRENT USE OF INSULIN (H): ICD-10-CM

## 2024-09-27 RX ORDER — EMPAGLIFLOZIN 10 MG/1
10 TABLET, FILM COATED ORAL DAILY
Qty: 90 TABLET | Refills: 1 | OUTPATIENT
Start: 2024-09-27

## 2024-09-28 ENCOUNTER — HEALTH MAINTENANCE LETTER (OUTPATIENT)
Age: 69
End: 2024-09-28

## 2024-10-15 SDOH — HEALTH STABILITY: PHYSICAL HEALTH: ON AVERAGE, HOW MANY DAYS PER WEEK DO YOU ENGAGE IN MODERATE TO STRENUOUS EXERCISE (LIKE A BRISK WALK)?: 3 DAYS

## 2024-10-15 SDOH — HEALTH STABILITY: PHYSICAL HEALTH: ON AVERAGE, HOW MANY MINUTES DO YOU ENGAGE IN EXERCISE AT THIS LEVEL?: 10 MIN

## 2024-10-15 ASSESSMENT — SOCIAL DETERMINANTS OF HEALTH (SDOH): HOW OFTEN DO YOU GET TOGETHER WITH FRIENDS OR RELATIVES?: ONCE A WEEK

## 2024-10-16 ENCOUNTER — OFFICE VISIT (OUTPATIENT)
Dept: INTERNAL MEDICINE | Facility: CLINIC | Age: 69
End: 2024-10-16
Payer: COMMERCIAL

## 2024-10-16 VITALS
BODY MASS INDEX: 22.55 KG/M2 | OXYGEN SATURATION: 96 % | DIASTOLIC BLOOD PRESSURE: 76 MMHG | RESPIRATION RATE: 16 BRPM | SYSTOLIC BLOOD PRESSURE: 134 MMHG | HEIGHT: 68 IN | WEIGHT: 148.8 LBS | HEART RATE: 58 BPM | TEMPERATURE: 96.8 F

## 2024-10-16 DIAGNOSIS — Z87.891 PERSONAL HISTORY OF TOBACCO USE: ICD-10-CM

## 2024-10-16 DIAGNOSIS — Z29.11 NEED FOR VACCINATION AGAINST RESPIRATORY SYNCYTIAL VIRUS: ICD-10-CM

## 2024-10-16 DIAGNOSIS — E78.5 HYPERLIPIDEMIA LDL GOAL <100: ICD-10-CM

## 2024-10-16 DIAGNOSIS — I10 BENIGN ESSENTIAL HYPERTENSION: ICD-10-CM

## 2024-10-16 DIAGNOSIS — Z12.5 SCREENING FOR PROSTATE CANCER: ICD-10-CM

## 2024-10-16 DIAGNOSIS — Z95.1 S/P CABG (CORONARY ARTERY BYPASS GRAFT): ICD-10-CM

## 2024-10-16 DIAGNOSIS — E11.9 TYPE 2 DIABETES, HBA1C GOAL < 8% (H): ICD-10-CM

## 2024-10-16 DIAGNOSIS — Z23 NEED FOR SHINGLES VACCINE: Primary | ICD-10-CM

## 2024-10-16 DIAGNOSIS — Z87.891 PERSONAL HISTORY OF NICOTINE DEPENDENCE: ICD-10-CM

## 2024-10-16 PROBLEM — E11.59 TYPE 2 DIABETES MELLITUS WITH OTHER CIRCULATORY COMPLICATION, WITHOUT LONG-TERM CURRENT USE OF INSULIN (H): Status: RESOLVED | Noted: 2022-11-16 | Resolved: 2024-10-16

## 2024-10-16 LAB
ALBUMIN SERPL BCG-MCNC: 4.8 G/DL (ref 3.5–5.2)
ALP SERPL-CCNC: 78 U/L (ref 40–150)
ALT SERPL W P-5'-P-CCNC: 21 U/L (ref 0–70)
ANION GAP SERPL CALCULATED.3IONS-SCNC: 9 MMOL/L (ref 7–15)
AST SERPL W P-5'-P-CCNC: 24 U/L (ref 0–45)
BILIRUB SERPL-MCNC: 0.6 MG/DL
BUN SERPL-MCNC: 21.4 MG/DL (ref 8–23)
CALCIUM SERPL-MCNC: 10.2 MG/DL (ref 8.8–10.4)
CHLORIDE SERPL-SCNC: 105 MMOL/L (ref 98–107)
CREAT SERPL-MCNC: 0.95 MG/DL (ref 0.67–1.17)
EGFRCR SERPLBLD CKD-EPI 2021: 86 ML/MIN/1.73M2
ERYTHROCYTE [DISTWIDTH] IN BLOOD BY AUTOMATED COUNT: 11.8 % (ref 10–15)
EST. AVERAGE GLUCOSE BLD GHB EST-MCNC: 137 MG/DL
GLUCOSE SERPL-MCNC: 113 MG/DL (ref 70–99)
HBA1C MFR BLD: 6.4 % (ref 0–5.6)
HCO3 SERPL-SCNC: 28 MMOL/L (ref 22–29)
HCT VFR BLD AUTO: 52.9 % (ref 40–53)
HGB BLD-MCNC: 17.5 G/DL (ref 13.3–17.7)
LDLC SERPL DIRECT ASSAY-MCNC: 87 MG/DL
MCH RBC QN AUTO: 29.4 PG (ref 26.5–33)
MCHC RBC AUTO-ENTMCNC: 33.1 G/DL (ref 31.5–36.5)
MCV RBC AUTO: 89 FL (ref 78–100)
PLATELET # BLD AUTO: 192 10E3/UL (ref 150–450)
POTASSIUM SERPL-SCNC: 4.6 MMOL/L (ref 3.4–5.3)
PROT SERPL-MCNC: 8.1 G/DL (ref 6.4–8.3)
PSA SERPL DL<=0.01 NG/ML-MCNC: 3.7 NG/ML (ref 0–6.5)
RBC # BLD AUTO: 5.95 10E6/UL (ref 4.4–5.9)
SODIUM SERPL-SCNC: 142 MMOL/L (ref 135–145)
WBC # BLD AUTO: 10 10E3/UL (ref 4–11)

## 2024-10-16 PROCEDURE — 85027 COMPLETE CBC AUTOMATED: CPT | Performed by: INTERNAL MEDICINE

## 2024-10-16 PROCEDURE — 83721 ASSAY OF BLOOD LIPOPROTEIN: CPT | Performed by: INTERNAL MEDICINE

## 2024-10-16 PROCEDURE — 90662 IIV NO PRSV INCREASED AG IM: CPT | Performed by: INTERNAL MEDICINE

## 2024-10-16 PROCEDURE — 80053 COMPREHEN METABOLIC PANEL: CPT | Performed by: INTERNAL MEDICINE

## 2024-10-16 PROCEDURE — 36415 COLL VENOUS BLD VENIPUNCTURE: CPT | Performed by: INTERNAL MEDICINE

## 2024-10-16 PROCEDURE — G0008 ADMIN INFLUENZA VIRUS VAC: HCPCS | Performed by: INTERNAL MEDICINE

## 2024-10-16 PROCEDURE — G0103 PSA SCREENING: HCPCS | Performed by: INTERNAL MEDICINE

## 2024-10-16 PROCEDURE — G0439 PPPS, SUBSEQ VISIT: HCPCS | Performed by: INTERNAL MEDICINE

## 2024-10-16 PROCEDURE — G0296 VISIT TO DETERM LDCT ELIG: HCPCS | Performed by: INTERNAL MEDICINE

## 2024-10-16 PROCEDURE — 83036 HEMOGLOBIN GLYCOSYLATED A1C: CPT | Performed by: INTERNAL MEDICINE

## 2024-10-16 PROCEDURE — 99214 OFFICE O/P EST MOD 30 MIN: CPT | Mod: 25 | Performed by: INTERNAL MEDICINE

## 2024-10-16 NOTE — PROGRESS NOTES
Preventive Care Visit  [facility]  Kaden Copeland MD, Internal Medicine  Oct 16, 2024    Assessment & Plan     Need for shingles vaccine  Referred to pharmacy    Need for vaccination against respiratory syncytial virus  Referred to pharmacy.     Benign essential hypertension  Good control on lisinopril, and metoprolol.   - CBC with platelets  - Comprehensive metabolic panel    S/P CABG (coronary artery bypass graft)  This was done 11/16/22 No angina symptoms.  Ongoing aspirin therapy.     Type 2 diabetes, HbA1C goal < 8%  Ongoing empaglifozin therapy, last A1c 7.2  - HEMOGLOBIN A1C    Hyperlipidemia LDL goal <100  Ongoing statin therapy.   - LDL cholesterol direct    Screening for prostate cancer  - PSA, screen    History of MI  Inferolateral on 11/2022 admission    Chronic systolic heart failure  Latest echo EF - 45%, mild.  Clinically compensated with metoprolol and lisinopril therapy.     Screening for colon cancer  Negative cologuard, 2/2023, due in 2/2026.     Personal history of nicotine dependence  We discussed the pros and cons of the Ct chest screening program.  He is interested.   - CT Chest Lung Cancer Scrn Low Dose w/o    Counseling  Appropriate preventive services were addressed with this patient.    Follow up     Mikhail Wallace is a 69 year old, presenting for the following:  Annual Visit        10/16/2024    11:28 AM   Additional Questions   Roomed by Kdaen WATTS RN     Health Care Directive  Patient does not have a Health Care Directive or Living Will: Discussed advance care planning with patient; however, patient declined at this time.    HPI  He is doing well overall.  No having angina, or orthopnea or unusual dyspnea or cough.  No bowel or bladder issues.  Taking medications, as prescribed.          10/15/2024   General Health   How would you rate your overall physical health? Good   Feel stress (tense, anxious, or unable to sleep) Not at all         10/15/2024    Nutrition   Diet: Low salt    Low fat/cholesterol         10/15/2024   Exercise   Days per week of moderate/strenous exercise 3 days   Average minutes spent exercising at this level 10 min          10/15/2024   Social Factors   Frequency of gathering with friends or relatives Once a week   Worry food won't last until get money to buy more No   Food not last or not have enough money for food? No   Do you have housing? (Housing is defined as stable permanent housing and does not include staying ouside in a car, in a tent, in an abandoned building, in an overnight shelter, or couch-surfing.) Yes   Are you worried about losing your housing? No   Lack of transportation? No   Unable to get utilities (heat,electricity)? No          10/15/2024   Fall Risk   Fallen 2 or more times in the past year? No   Trouble with walking or balance? No             10/15/2024   Activities of Daily Living- Home Safety   Needs help with the following daily activites Transportation   Safety concerns in the home No grab bars in the bathroom          10/15/2024   Dental   Dentist two times every year? (!) NO         10/15/2024   Hearing Screening   Hearing concerns? (!) IT'S HARD TO FOLLOW A CONVERSATION IN A NOISY RESTAURANT OR CROWDED ROOM.    (!) TROUBLE UNDERSTANDING SOFT OR WHISPERED SPEECH.          10/15/2024   Driving Risk Screening   Patient/family members have concerns about driving No         10/15/2024   General Alertness/Fatigue Screening   Have you been more tired than usual lately? No          10/15/2024   Urinary Incontinence Screening   Bothered by leaking urine in past 6 months No          10/15/2024   TB Screening   Were you born outside of the [place]? No     Today's PHQ-2 Score:       10/15/2024    10:53 AM   PHQ-2 ( 1999 [employer])   Q1: Little interest or pleasure in doing things 0   Q2: Feeling down, depressed or hopeless 0   PHQ-2 Score 0   Q1: Little interest or pleasure in doing things Not at all   Q2: Feeling down,  depressed or hopeless Not at all   PHQ-2 Score 0         10/15/2024   Substance Use   Alcohol more than 3/day or more than 7/wk No   Do you have a current opioid prescription? No   How severe/bad is pain from 1 to 10? 0/10 (No Pain)   Do you use any other substances recreationally? No     Social History     Tobacco Use    Smoking status: Former     Current packs/day: 0.00     Average packs/day: 0.5 packs/day for 45.9 years (22.9 ttl pk-yrs)     Types: Cigarettes     Start date:      Quit date: 11/15/2022     Years since quittin.9    Smokeless tobacco: Never   Vaping Use    Vaping status: Never Used   Substance Use Topics    Alcohol use: Yes     Comment: 2-4 beers per month.    Drug use: Never   Reviewed and updated as needed this visit by Provider   Current providers sharing in care for this patient include:  Patient Care Team:  Armando Tompkins MD as PCP - General (Internal Medicine)  Armando Tompkins MD as Assigned PCP  Marissa Salmeron MD as Assigned Heart and Vascular Provider  Felton Tran MD as MD (Urology)  Silvia Peng PA-C as Physician Assistant (Urology)  Maynor Lucas MD as Assigned Surgical Provider    The following health maintenance items are reviewed in Epic and correct as of today:  Health Maintenance   Topic Date Due    DIABETIC FOOT EXAM  Never done    ZOSTER IMMUNIZATION (1 of 2) Never done    LUNG CANCER SCREENING  Never done    RSV VACCINE (1 - Risk 60-74 years 1-dose series) Never done    ANNUAL REVIEW OF HM ORDERS  2024    A1C  08/10/2024    INFLUENZA VACCINE (1) 2024    COVID-19 Vaccine ( - - season) 2024    EYE EXAM  2025    BMP  05/10/2025    LIPID  05/10/2025    MICROALBUMIN  2025    MEDICARE ANNUAL WELLNESS VISIT  10/16/2025    FALL RISK ASSESSMENT  10/16/2025    DTAP/TDAP/TD IMMUNIZATION (2 - Td or Tdap) 10/16/2025    COLORECTAL CANCER SCREENING  2026    ADVANCE CARE PLANNING  2028    HEPATITIS C  "SCREENING  Completed    PHQ-2 (once per calendar year)  Completed    Pneumococcal Vaccine: 65+ Years  Completed    AORTIC ANEURYSM SCREENING (SYSTEM ASSIGNED)  Completed    HPV IMMUNIZATION  Aged Out    MENINGITIS IMMUNIZATION  Aged Out    RSV MONOCLONAL ANTIBODY  Aged Out     Review of Systems  See HPI    Objective      PHYSICAL EXAM:  General Appearance: In no acute distress  Vitals:    10/16/24 1131   BP: 134/76   BP Location: Left arm   Patient Position: Sitting   Cuff Size: Adult Regular   Pulse: 58   Resp: 16   Temp: 96.8  F (36  C)   TempSrc: Tympanic   SpO2: 96%   Weight: 67.5 kg (148 lb 12.8 oz)   Height: 1.725 m (5' 7.9\")     Estimated body mass index is 22.69 kg/m  as calculated from the following:    Height as of this encounter: 1.725 m (5' 7.9\").    Weight as of this encounter: 67.5 kg (148 lb 12.8 oz).  EYES: Clear, fundi are unremarkable   HEENT: nose and throat clear, ears normal   NECK:  without cervical or axillary adenopathy  RESPIRATORY: Clear   CARDIOVASCULAR: S1, S2  ABDOMEN: soft, flat, and non-tender  EXTREMITIES:  no significant inflammation or edema  NEUROLOGIC: Speech is clear, gait is normal  PSYCHIATRIC: Oriented X 3,  thinking is clear         10/16/2024   Mini Cog   Clock Draw Score 2 Normal   3 Item Recall 3 objects recalled   Mini Cog Total Score 5        Cognitive function is intact today.     Signed Electronically by: Kaden Copeland MD  Lung Cancer Screening Shared Decision Making Visit     Rodrigo Lanier, a 69 year old male, is eligible for lung cancer screening    History   Smoking Status    Former    Types: Cigarettes   Smokeless Tobacco    Never   I have discussed with patient the risks and benefits of screening for lung cancer with low-dose CT.     The risks include:    radiation exposure: one low dose chest CT has as much ionizing radiation as about 15 chest x-rays, or 6 months of background radiation living in [place]      false positives: most findings/nodules are NOT " cancer, but some might still require additional diagnostic evaluation, including biopsy    over-diagnosis: some slow growing cancers that might never have been clinically significant will be detected and treated unnecessarily     The benefit of early detection of lung cancer is contingent upon adherence to annual screening or more frequent follow up if indicated.     Furthermore, to benefit from screening, Rodrigo must be willing and able to undergo diagnostic procedures, if indicated. Although no specific guide is available for determining severity of comorbidities, it is reasonable to withhold screening in patients who have greater mortality risk from other diseases.     We did discuss that the best way to prevent lung cancer is to not smoke.

## 2024-10-16 NOTE — PATIENT INSTRUCTIONS
Lung Cancer Screening   Frequently Asked Questions  If you are at high-risk for lung cancer, getting screened with low-dose computed tomography (LDCT) every year can help save your life. This handout offers answers to some of the most common questions about lung cancer screening. If you have other questions, please call [phone] ([phone]).     What is it?  Lung cancer screening uses special X-ray technology to create an image of your lung tissue. The exam is quick and easy and takes less than 10 seconds. We don t give you any medicine or use any needles. You can eat before and after the exam. You don t need to change your clothes as long as the clothing on your chest doesn t contain metal. But, you do need to be able to hold your breath for at least 6 seconds during the exam.    What is the goal of lung cancer screening?  The goal of lung cancer screening is to save lives. Many times, lung cancer is not found until a person starts having physical symptoms. Lung cancer screening can help detect lung cancer in the earliest stages when it may be easier to treat.    Who should be screened for lung cancer?  We suggest lung cancer screening for anyone who is at high-risk for lung cancer. You are in the high-risk group if you:      are between the ages of 55 and 79, and    have smoked at least 1 pack of cigarettes a day for 20 or more years, and    still smoke or have quit within the past 15 years.    However, if you have a new cough or shortness of breath, you should talk to your doctor before being screened.    Why does it matter if I have symptoms?  Certain symptoms can be a sign that you have a condition in your lungs that should be checked and treated by your doctor. These symptoms include fever, chest pain, a new or changing cough, shortness of breath that you have never felt before, coughing up blood or unexplained weight loss. Having any of these symptoms can greatly affect the results of lung  cancer screening.       Should all smokers get an LDCT lung cancer screening exam?  It depends. Lung cancer screening is for a very specific group of men and women who have a history of heavy smoking over a long period of time (see  Who should be screened for lung cancer  above).  I am in the high-risk group, but have been diagnosed with cancer in the past. Is LDCT lung cancer screening right for me?  In some cases, you should not have LDCT lung screening, such as when your doctor is already following your cancer with CT scan studies. Your doctor will help you decide if LDCT lung screening is right for you.  Do I need to have a screening exam every year?  Yes. If you are in the high-risk group described earlier, you should get an LDCT lung cancer screening exam every year until you are 79, or are no longer willing or able to undergo screening and possible procedures to diagnose and treat lung cancer.  How effective is LDCT at preventing death from lung cancer?  Studies have shown that LDCT lung cancer screening can lower the risk of death from lung cancer by 20 percent in people who are at high-risk.  What are the risks?  There are some risks and limitations of LDCT lung cancer screening. We want to make sure you understand the risks and benefits, so please let us know if you have any questions. Your doctor may want to talk with you more about these risks.    Radiation exposure: As with any exam that uses radiation, there is a very small increased risk of cancer. The amount of radiation in LDCT is small--about the same amount a person would get from a mammogram. Your doctor orders the exam when he or she feels the potential benefits outweigh the risks.    False negatives: No test is perfect, including LDCT. It is possible that you may have a medical condition, including lung cancer, that is not found during your exam. This is called a false negative result.    False positives and more testing: LDCT very often finds  something in the lung that could be cancer, but in fact is not. This is called a false positive result. False positive tests often cause anxiety. To make sure these findings are not cancer, you may need to have more tests. These tests will be done only if you give us permission. Sometimes patients need a treatment that can have side effects, such as a biopsy. For more information on false positives, see  What can I expect from the results?     Findings not related to lung cancer: Your LDCT exam also takes pictures of areas of your body next to your lungs. In a very small number of cases, the CT scan will show an abnormal finding in one of these areas, such as your kidneys, adrenal glands, liver or thyroid. This finding may not be serious, but you may need more tests. Your doctor can help you decide what other tests you may need, if any.  What can I expect from the results?  About 1 out of 4 LDCT exams will find something that may need more tests. Most of the time, these findings are lung nodules. Lung nodules are very small collections of tissue in the lung. These nodules are very common, and the vast majority--more than 97 percent--are not cancer (benign). Most are normal lymph nodes or small areas of scarring from past infections.  But, if a small lung nodule is found to be cancer, the cancer can be cured more than 90 percent of the time. To know if the nodule is cancer, we may need to get more images before your next yearly screening exam. If the nodule has suspicious features (for example, it is large, has an odd shape or grows over time), we will refer you to a specialist for further testing.  Will my doctor also get the results?  Yes. Your doctor will get a copy of your results.  Is it okay to keep smoking now that there s a cancer screening exam?  No. Tobacco is one of the strongest cancer-causing agents. It causes not only lung cancer, but other cancers and cardiovascular (heart) diseases as well. The damage  caused by smoking builds over time. This means that the longer you smoke, the higher your risk of disease. While it is never too late to quit, the sooner you quit, the better.  Where can I find help to quit smoking?  The best way to prevent lung cancer is to stop smoking. If you have already quit smoking, congratulations and keep it up! For help on quitting smoking, please call [employer] at 1-891-QUITNOW (1-942.293.8026) or the American Cancer Society at 1-857.556.5717 to find local resources near you.  One-on-one health coaching:  If you d prefer to work individually with a health care provider on tobacco cessation, we offer:      Medication Therapy Management:  Our specially trained pharmacists work closely with you and your doctor to help you quit smoking.  Call [phone] or [phone] (toll free).

## 2024-10-30 ENCOUNTER — HOSPITAL ENCOUNTER (OUTPATIENT)
Dept: CT IMAGING | Facility: CLINIC | Age: 69
Discharge: HOME | End: 2024-10-30
Attending: INTERNAL MEDICINE | Admitting: INTERNAL MEDICINE
Payer: COMMERCIAL

## 2024-10-30 DIAGNOSIS — Z87.891 PERSONAL HISTORY OF NICOTINE DEPENDENCE: ICD-10-CM

## 2024-10-30 PROCEDURE — 71271 CT THORAX LUNG CANCER SCR C-: CPT | Mod: 26 | Performed by: RADIOLOGY

## 2024-10-30 PROCEDURE — 71271 CT THORAX LUNG CANCER SCR C-: CPT

## 2024-10-31 ENCOUNTER — TELEPHONE (OUTPATIENT)
Dept: INTERNAL MEDICINE | Facility: CLINIC | Age: 69
End: 2024-10-31
Payer: COMMERCIAL

## 2024-10-31 DIAGNOSIS — N28.1 RENAL CYST, ACQUIRED, RIGHT: Primary | ICD-10-CM

## 2024-10-31 NOTE — TELEPHONE ENCOUNTER
[facility] - radiology    Date/time of call received from lab: 10/31/24 at 8:11 AM.    Lab test:  CT CHEST LUNG CANCER SCREEN LOW  DOSE WITHOUT    Lab value:  2. Significant Incidental Finding(s):  Category S: Yes.  a.  Intermediate density renal cystic focus the right with  nephrolithiasis. Consider MRI for further evaluation    Ordering provider name: Dinorah    [facility]    Primary Care Provider: Armando Tompkins     Result managed by: Clinic Hours: Primary Care clinic RN staff. Was test ordered in Primary Care?: Yes, ordered in Primary Care Primary Care: route telephone encounter high priority to ordering provider and ordering provider's clinic Nurse pool

## 2024-10-31 NOTE — TELEPHONE ENCOUNTER
Please let him know that I can order an MRI of the kidney in question. Let me know, otherwise, he can wait to talk more about it with Dr Copeland upon his return in 2 weeks.    Armando Tompkins MD on 10/31/2024 at 9:00 AM

## 2024-11-01 NOTE — TELEPHONE ENCOUNTER
Yes, a non-specific cystic lesion so the radiologist recommended that we consider getting an MRI to give more detail. They are not suggesting that it is anything serious but they want to be sure.    Armando Tompkins MD on 11/1/2024 at 3:40 PM

## 2024-11-04 NOTE — TELEPHONE ENCOUNTER
"Patient notified of incidental finding on CT Chest Lung Cancer Screen of non-specific cystic renal lesion. Informed patient of Dr. Tompkins' response that \"the radiologist recommended that we consider getting an MRI to give more detail. They are not suggesting that it is anything serious but they want to be sure.\" Patient verbalized understanding.    Informed patient that Dr. Tompkins can place the order for the MRI of the kidney. Patient agreeable. He would like the order placed. He asks to be notified when the order is placed via [employer]. Patient given the phone number to schedule MRI ([phone]) after order is placed.   "

## 2024-12-12 ENCOUNTER — HOSPITAL ENCOUNTER (OUTPATIENT)
Dept: MRI IMAGING | Facility: CLINIC | Age: 69
End: 2024-12-12
Attending: INTERNAL MEDICINE
Payer: COMMERCIAL

## 2024-12-12 DIAGNOSIS — N28.1 RENAL CYST, ACQUIRED, RIGHT: ICD-10-CM

## 2024-12-12 PROCEDURE — 255N000002 HC RX 255 OP 636: Performed by: INTERNAL MEDICINE

## 2024-12-12 PROCEDURE — 74183 MRI ABD W/O CNTR FLWD CNTR: CPT

## 2024-12-12 PROCEDURE — A9585 GADOBUTROL INJECTION: HCPCS | Performed by: INTERNAL MEDICINE

## 2024-12-12 RX ORDER — GADOBUTROL 604.72 MG/ML
7.5 INJECTION INTRAVENOUS ONCE
Status: COMPLETED | OUTPATIENT
Start: 2024-12-12 | End: 2024-12-12

## 2024-12-12 RX ADMIN — GADOBUTROL 7 ML: 604.72 INJECTION INTRAVENOUS at 13:17

## 2024-12-30 DIAGNOSIS — N20.0 NEPHROLITHIASIS: Primary | ICD-10-CM

## 2024-12-30 NOTE — PROGRESS NOTES
We spoke about his kidney MRI. The lesion is a cyst, not a concern. The stone partially obstructing the UPJ on the right is showing the hydronephrosis, increasing since last year.  He is asymptomatic.  He agrees to see urology about it.  I have placed a referral. Dr. Dinorah MD

## 2025-01-18 ENCOUNTER — HEALTH MAINTENANCE LETTER (OUTPATIENT)
Age: 70
End: 2025-01-18

## 2025-01-28 ENCOUNTER — OFFICE VISIT (OUTPATIENT)
Dept: OPHTHALMOLOGY | Facility: CLINIC | Age: 70
End: 2025-01-28
Attending: OPHTHALMOLOGY
Payer: COMMERCIAL

## 2025-01-28 DIAGNOSIS — H18.513 FUCHS' CORNEAL DYSTROPHY OF BOTH EYES: Primary | ICD-10-CM

## 2025-01-28 DIAGNOSIS — H53.032 STRABISMIC AMBLYOPIA OF LEFT EYE: ICD-10-CM

## 2025-01-28 DIAGNOSIS — H25.13 NUCLEAR SCLEROTIC CATARACT OF BOTH EYES: ICD-10-CM

## 2025-01-28 DIAGNOSIS — E11.9 DIABETES MELLITUS TYPE 2 WITHOUT RETINOPATHY (H): ICD-10-CM

## 2025-01-28 PROCEDURE — 76514 ECHO EXAM OF EYE THICKNESS: CPT | Performed by: OPHTHALMOLOGY

## 2025-01-28 PROCEDURE — G0463 HOSPITAL OUTPT CLINIC VISIT: HCPCS | Performed by: OPHTHALMOLOGY

## 2025-01-28 ASSESSMENT — PACHYMETRY
OD_CT(UM): 556
OS_CT(UM): 566

## 2025-01-28 ASSESSMENT — CONF VISUAL FIELD
OD_INFERIOR_TEMPORAL_RESTRICTION: 0
OS_INFERIOR_NASAL_RESTRICTION: 0
OS_SUPERIOR_NASAL_RESTRICTION: 0
OS_INFERIOR_TEMPORAL_RESTRICTION: 0
OD_INFERIOR_NASAL_RESTRICTION: 0
OD_NORMAL: 1
OS_NORMAL: 1
METHOD: COUNTING FINGERS
OS_SUPERIOR_TEMPORAL_RESTRICTION: 0
OD_SUPERIOR_NASAL_RESTRICTION: 0
OD_SUPERIOR_TEMPORAL_RESTRICTION: 0

## 2025-01-28 ASSESSMENT — CUP TO DISC RATIO
OS_RATIO: 0.1
OD_RATIO: 0.1

## 2025-01-28 ASSESSMENT — EXTERNAL EXAM - RIGHT EYE: OD_EXAM: BROW PTOSIS

## 2025-01-28 ASSESSMENT — VISUAL ACUITY
OD_SC+: -2
METHOD: SNELLEN - LINEAR
OS_SC: 20/125
OD_SC: 20/25
OS_SC+: -1
OS_PH_SC: 20/100

## 2025-01-28 ASSESSMENT — TONOMETRY
OS_IOP_MMHG: 16
OD_IOP_MMHG: 10
IOP_METHOD: TONOPEN

## 2025-01-28 ASSESSMENT — EXTERNAL EXAM - LEFT EYE: OS_EXAM: BROW PTOSIS

## 2025-01-28 NOTE — PROGRESS NOTES
HPI       COMPREHENSIVE EYE EXAM    In both eyes.  Since onset it is stable.  Associated symptoms include tearing and floaters (infrequent).  Negative for dryness and eye pain.  Pain was noted as 0/10.             Comments    He states that his vision has seemed stable in both eyes, since his last eye exam.  A couple weeks ago he had a day when his right eye was really tearing.  The problem resolved and has not reoccurred.    Lab Results       Component                Value               Date                       A1C                      6.4                 10/16/2024                 A1C                      7.2                 05/10/2024                 A1C                      6.2                 05/11/2023                 A1C                      6.6                 11/16/2022                  KAYLA Mitchell 9:54 AM  January 28, 2025                  Last edited by Cassie Caballero COT on 1/28/2025  9:54 AM.          Review of systems for the eyes was negative other than the pertinent positives/negatives listed in the HPI.      Assessment & Plan      Rodrigo Lanier is a 69 year old male with the following diagnoses:   1. Fuchs' corneal dystrophy of both eyes    2. Nuclear sclerotic cataract of both eyes    3. Strabismic amblyopia of left eye    4. Diabetes mellitus type 2 without retinopathy (H)         Ocular Hx: amblyopia left eye s/p strab surgery (x2) in childhood  FOHx: sister - strabismus; mother - strabismus    New patient to me, here for annual dilated fundus exam   Noting some more difficulty with night driving.  Wearing Over the counter readers only     No retinopathy  Stressed good glycemic and hypertensive control  Monitor yearly     New diagnosis of Fuch's reviewed  Not visually significant     Refractive options reviewed  Refraction given        Patient disposition:   Return in about 1 year (around 1/28/2026) for DFE, pachy, Refraction.           Attending Physician Attestation:  Complete  documentation of historical and exam elements from today's encounter can be found in the full encounter summary report (not reduplicated in this progress note).  I personally obtained the chief complaint(s) and history of present illness.  I confirmed and edited as necessary the review of systems, past medical/surgical history, family history, social history, and examination findings as documented by others; and I examined the patient myself.  I personally reviewed the relevant tests, images, and reports as documented above.  I formulated and edited as necessary the assessment and plan and discussed the findings and management plan with the patient and family. . - Sebastian Soriano MD

## 2025-01-28 NOTE — NURSING NOTE
Chief Complaints and History of Present Illnesses   Patient presents with    COMPREHENSIVE EYE EXAM     Chief Complaint(s) and History of Present Illness(es)       COMPREHENSIVE EYE EXAM              Laterality: both eyes    Course: stable    Associated symptoms: tearing and floaters (infrequent).  Negative for dryness and eye pain    Pain scale: 0/10              Comments    He states that his vision has seemed stable in both eyes, since his last eye exam.  A couple weeks ago he had a day when his right eye was really tearing.  The problem resolved and has not reoccurred.    Lab Results       Component                Value               Date                       A1C                      6.4                 10/16/2024                 A1C                      7.2                 05/10/2024                 A1C                      6.2                 05/11/2023                 A1C                      6.6                 11/16/2022                  KAYLA Mitchell 9:54 AM  January 28, 2025

## 2025-02-17 ENCOUNTER — VIRTUAL VISIT (OUTPATIENT)
Dept: UROLOGY | Facility: CLINIC | Age: 70
End: 2025-02-17
Payer: COMMERCIAL

## 2025-02-17 ENCOUNTER — TELEPHONE (OUTPATIENT)
Dept: UROLOGY | Facility: CLINIC | Age: 70
End: 2025-02-17

## 2025-02-17 VITALS — WEIGHT: 150 LBS | BODY MASS INDEX: 22.73 KG/M2 | HEIGHT: 68 IN

## 2025-02-17 DIAGNOSIS — N20.0 RIGHT NEPHROLITHIASIS: Primary | ICD-10-CM

## 2025-02-17 PROCEDURE — 98006 SYNCH AUDIO-VIDEO EST MOD 30: CPT | Performed by: UROLOGY

## 2025-02-17 ASSESSMENT — PAIN SCALES - GENERAL: PAINLEVEL_OUTOF10: NO PAIN (0)

## 2025-02-17 NOTE — PROGRESS NOTES
Virtual Visit Details    Type of service:  Video Visit   Video Start Time:  1:00 PM  Video End Time:1:14 PM    Originating Location (pt. Location): Home    Distant Location (provider location):  On-site  Platform used for Video Visit: [facility]    Assessment & Plan   Assessment & Plan  Right Kidney Stones  Right hydronephrosis, new    Asymptomatic but with worsening hydronephrosis on recent MRI. Discussed the risk of irreversible kidney damage if left untreated. Three stones identified, one large stone in the ureter causing obstruction.  -Plan for percutaneous nephrolithotomy with the goal of complete stone removal.  We discussed the benefits and risks of percutaneous nephrolithotomy, including but not limited to, bleeding requiring blood transfusion, infection, need for nephrostomy tube or ureteral stent, ureteral stent related symptoms and cystoscopic removal in the office after surgery, injury to ureter, injury to the kidney rarely leading to long-term kidney damage, injury to the lungs/colon/intraabdomnal organs, need for second procedure if unable to reach stone or residual fragments.  Aware that he would be spending the night in the hospital after surgery and get a CT scan on postoperative day 0 or 1.    Plan:  -Preoperative labs and urine culture to be done.  -Preoperative visit with primary care physician for anesthesia clearance.  -Aspirin to be held before the procedure if possible  -2 hr right PCNL at [facility]    Follow-up  Procedure to be scheduled within a month. Postoperative follow-up to be arranged.       Consent was obtained from the patient to use an AI documentation tool in the creation of this note.    Maynor Lucas MD   Urology  [facility]         Subjective     History of Present Illness  Rodrigo Lanier is a 69 year old male with a history of  large renal pelvis stone without obstruction here for follow-up.  The patient reports no discomfort or other symptoms related to  the stones since his last visit in August 2024. Despite the absence of symptoms, recent imaging studies have shown worsening of his kidney condition, with increased swelling and potential blockage of urine. The patient has been on Jardiance since his last visit, which has led to increased urination.    Results  RADIOLOGY  Kidney MRI 12/12/2024 : Increased swelling of the kidney; 2 cm right ureteral stone stone causing right hydronephrosis.  Stable upper pole and midpole right renal stones. Has an endophytic 4cm right renal hemorrhagic/proteinaceous cyst       Latest Reference Range & Units 10/16/24 12:45   Creatinine 0.67 - 1.17 mg/dL 0.95

## 2025-02-17 NOTE — TELEPHONE ENCOUNTER
PCNL    Spoke with: Patient       Date of surgery: Wednesday March 19 2025 with Dr Lucas       Location: [facility]       Informed patient they will need a adult : 23 hr obs      Pre op with provider: Patient will schedule at the  [facility]       H&P Scheduled in PAC- NA         Pre procedure covid : No req      Additional imaging: NA        Surgery Packet :Sent via UIBLUEPRINT      Additional comments: Please call for surgery teaching

## 2025-02-17 NOTE — NURSING NOTE
Current patient location: [address] [place] [address]    Is the patient currently in the state of [place]? YES    Visit mode: VIDEO    If the visit is dropped, the patient can be reconnected by:VIDEO VISIT: Send to e-mail at: [email or web address]    Will anyone else be joining the visit? NO  (If patient encounters technical issues they should call [phone]    Are changes needed to the allergy or medication list? No    Are refills needed on medications prescribed by this physician? NO    Rooming Documentation:  Questionnaire(s) completed    Reason for visit: RECHECK    Li MUNSONF

## 2025-03-10 ENCOUNTER — TELEPHONE (OUTPATIENT)
Dept: UROLOGY | Facility: CLINIC | Age: 70
End: 2025-03-10
Payer: COMMERCIAL

## 2025-03-10 NOTE — TELEPHONE ENCOUNTER
Called pt and lm with direct line if pt would prefer to go over the pre-surgery information by phone and that I sent it in [employer] as well.  Paige, RN  Care Coordinator Urology  [phone]

## 2025-03-12 ENCOUNTER — OFFICE VISIT (OUTPATIENT)
Dept: INTERNAL MEDICINE | Facility: CLINIC | Age: 70
End: 2025-03-12
Payer: COMMERCIAL

## 2025-03-12 VITALS
DIASTOLIC BLOOD PRESSURE: 78 MMHG | HEIGHT: 68 IN | SYSTOLIC BLOOD PRESSURE: 134 MMHG | TEMPERATURE: 96.8 F | RESPIRATION RATE: 11 BRPM | BODY MASS INDEX: 22.94 KG/M2 | OXYGEN SATURATION: 97 % | WEIGHT: 151.4 LBS

## 2025-03-12 DIAGNOSIS — Z01.818 PREOP GENERAL PHYSICAL EXAM: Primary | ICD-10-CM

## 2025-03-12 DIAGNOSIS — E11.59 TYPE 2 DIABETES MELLITUS WITH OTHER CIRCULATORY COMPLICATION, WITHOUT LONG-TERM CURRENT USE OF INSULIN (H): ICD-10-CM

## 2025-03-12 DIAGNOSIS — N20.0 RIGHT NEPHROLITHIASIS: ICD-10-CM

## 2025-03-12 DIAGNOSIS — I25.10 CORONARY ARTERY DISEASE INVOLVING NATIVE HEART WITHOUT ANGINA PECTORIS, UNSPECIFIED VESSEL OR LESION TYPE: ICD-10-CM

## 2025-03-12 DIAGNOSIS — I10 BENIGN ESSENTIAL HYPERTENSION: ICD-10-CM

## 2025-03-12 DIAGNOSIS — I50.22 CHRONIC SYSTOLIC (CONGESTIVE) HEART FAILURE (H): ICD-10-CM

## 2025-03-12 LAB
ERYTHROCYTE [DISTWIDTH] IN BLOOD BY AUTOMATED COUNT: 11.4 % (ref 10–15)
EST. AVERAGE GLUCOSE BLD GHB EST-MCNC: 143 MG/DL
HBA1C MFR BLD: 6.6 % (ref 0–5.6)
HCT VFR BLD AUTO: 49.4 % (ref 40–53)
HGB BLD-MCNC: 17.2 G/DL (ref 13.3–17.7)
MCH RBC QN AUTO: 30 PG (ref 26.5–33)
MCHC RBC AUTO-ENTMCNC: 34.8 G/DL (ref 31.5–36.5)
MCV RBC AUTO: 86 FL (ref 78–100)
PLATELET # BLD AUTO: 203 10E3/UL (ref 150–450)
RBC # BLD AUTO: 5.74 10E6/UL (ref 4.4–5.9)
WBC # BLD AUTO: 8.5 10E3/UL (ref 4–11)

## 2025-03-12 PROCEDURE — 83036 HEMOGLOBIN GLYCOSYLATED A1C: CPT | Performed by: INTERNAL MEDICINE

## 2025-03-12 PROCEDURE — 36415 COLL VENOUS BLD VENIPUNCTURE: CPT | Performed by: INTERNAL MEDICINE

## 2025-03-12 PROCEDURE — 85027 COMPLETE CBC AUTOMATED: CPT | Performed by: INTERNAL MEDICINE

## 2025-03-12 NOTE — PATIENT INSTRUCTIONS
How to Take Your Medication Before Surgery  Preoperative Medication Instructions   Antiplatelet or Anticoagulation Medication Instructions   - aspirin: Discontinue aspirin 7 days prior to procedure to reduce bleeding risk. It should be resumed postoperatively.     Additional Medication Instructions  Will hold Jardiance for 2-3 days ahead of the procedure       Patient Education   Preparing for Your Surgery  For Adults  Getting started  In most cases, a nurse will call to review your health history and instructions. They will give you an arrival time based on your scheduled surgery time. Please be ready to share:  Your doctor's clinic name and phone number  Your medical, surgical, and anesthesia history  A list of allergies and sensitivities  A list of medicines, including herbal treatments and over-the-counter drugs  Whether the patient has a legal guardian (ask how to send us the papers in advance)  Note: You may not receive a call if you were seen at our PAC (Preoperative Assessment Center).  Please tell us if you're pregnant--or if there's any chance you might be pregnant. Some surgeries may injure a fetus (unborn baby), so they require a pregnancy test. Surgeries that are safe for a fetus don't always need a test, and you can choose whether to have one.   Preparing for surgery  Within 10 to 30 days of surgery: Have a pre-op exam (sometimes called an H&P, or History and Physical). This can be done at a clinic or pre-operative center.  If you're having a , you may not need this exam. Talk to your care team.  At your pre-op exam, talk to your care team about all medicines you take. (This includes CBD oil and any drugs, such as THC, marijuana, and other forms of cannabis.) If you need to stop any medicine before surgery, ask when to start taking it again.  This is for your safety. Many medicines and drugs can make you bleed too much during surgery. Some change how well surgery (anesthesia) drugs work.  Call  your insurance company to let them know you're having surgery. (If you don't have insurance, call [phone].)  Call your clinic if there's any change in your health. This includes a scrape or scratch near the surgery site, or any signs of a cold (sore throat, runny nose, cough, rash, fever).  Eating and drinking guidelines  For your safety: Unless your surgeon tells you otherwise, follow the guidelines below.  Eat and drink as normal until 8 hours before you arrive for surgery. After that, no food or milk. You can spit out gum when you arrive.  Drink clear liquids until 2 hours before you arrive. These are liquids you can see through, like water, Gatorade, and Propel Water. They also include plain black coffee and tea (no cream or milk).  No alcohol for 24 hours before you arrive. The night before surgery, stop any drinks that contain THC.  If your care team tells you to take medicine on the morning of surgery, it's okay to take it with a sip of water. No other medicines or drugs are allowed (including CBD oil)--follow your care team's instructions.  If you have questions the day of surgery, call your hospital or surgery center.   Preventing infection  Shower or bathe the night before and the morning of surgery. Follow the instructions your clinic gave you. (If no instructions, use regular soap.)  Don't shave or clip hair near your surgery site. We'll remove the hair if needed.  Don't smoke or vape the morning of surgery. No chewing tobacco for 6 hours before you arrive. A nicotine patch is okay. You may spit out nicotine gum when you arrive.  For some surgeries, the surgeon will tell you to fully quit smoking and nicotine.  We will make every effort to keep you safe from infection. We will:  Clean our hands often with soap and water (or an alcohol-based hand rub).  Clean the skin at your surgery site with a special soap that kills germs.  Give you a special gown to keep you warm. (Cold raises the risk of  infection.)  Wear hair covers, masks, gowns, and gloves during surgery.  Give antibiotic medicine, if prescribed. Not all surgeries need this medicine.  What to bring on the day of surgery  Photo ID and insurance card  Copy of your health care directive, if you have one  Glasses and hearing aids (bring cases)  You can't wear contacts during surgery  Inhaler and eye drops, if you use them (tell us about these when you arrive)  CPAP machine or breathing device, if you use them  A few personal items, if spending the night  If you have . . .  A pacemaker, ICD (cardiac defibrillator), or other implant: Bring the ID card.  An implanted stimulator: Bring the remote control.  A legal guardian: Bring a copy of the certified (court-stamped) guardianship papers.  Please remove any jewelry, including body piercings. Leave jewelry and other valuables at home.  If you're going home the day of surgery  You must have a responsible adult drive you home. They should stay with you overnight as well.  If you don't have someone to stay with you, and you aren't safe to go home alone, we may keep you overnight. Insurance often won't pay for this.  After surgery  If it's hard to control your pain or you need more pain medicine, please call your surgeon's office.  Questions?   If you have any questions for your care team, list them here:   ____________________________________________________________________________________________________________________________________________________________________________________________________________________________________________________________  For informational purposes only. Not to replace the advice of your health care provider. Copyright   2003, 2019 [facility]. All rights reserved. Clinically reviewed by Eric Rosas MD. [place] [id number] - REV 08/24.

## 2025-03-12 NOTE — H&P (VIEW-ONLY)
Preoperative Evaluation  [facility]  [address]  [facility]  [place] [address]  Phone: [phone]  Fax: [phone]  Primary Provider: Armando Tompkins MD  Pre-op Performing Provider: Armando Tompkins MD  Mar 12, 2025             3/9/2025   Surgical Information   What procedure is being done? Surgery to remove kidney stone   Facility or Hospital where procedure/surgery will be performed: [facility]   Who is doing the procedure / surgery? Dr. Maynor Lucas   Date of surgery / procedure: March 19th 2025   Time of surgery / procedure: 7:30 AM   Where do you plan to recover after surgery? at home alone     Fax number for surgical facility: Note does not need to be faxed, will be available electronically in Epic.    Assessment & Plan     The proposed surgical procedure is considered INTERMEDIATE risk.    (Z01.818) Preop general physical exam  (primary encounter diagnosis)  Comment: planned nephrolithotomy, chronic, retained right ureteral stone with moderate hydro. Asx   Plan: okay for procedure. All other conditions stable.    (N20.0) Right nephrolithiasis  Comment: noted incidentally.  Plan: see above.    (I50.22) Chronic systolic (congestive) heart failure (H)  Comment: last EF 45% noted on echo last year. No new events or symptoms.  Plan: on appropriate medications.    (I25.10) Coronary artery disease involving native heart without angina pectoris, unspecified vessel or lesion type  Comment: hx of MI, cabg in 2022  Plan: EKG 12-lead, tracing only        Stable.     (E11.59) Type 2 diabetes mellitus with other circulatory complication, without long-term current use of insulin (H)  Comment: well controlled  Plan: HEMOGLOBIN A1C        Recheck today.    (I10) Benign essential hypertension  Comment: controlled  Plan: Basic metabolic panel  (Ca, Cl, CO2, Creat,         Gluc, K, Na, BUN)        Will plan to continue on previous medication  without changes             - No identified additional risk factors other than previously addressed    Preoperative Medication Instructions  Antiplatelet or Anticoagulation Medication Instructions   - aspirin: Discontinue aspirin 7 days prior to procedure to reduce bleeding risk. It should be resumed postoperatively.     Additional Medication Instructions  Hold Jardiance for 2-3 days ahead of time.    Recommendation  Approval given to proceed with proposed procedure, without further diagnostic evaluation.    Mikhail Wallace is a 69 year old, presenting for the following:  Pre-Op Exam (Kidney stone- DOS 03/19/2025 at [facility] )          3/12/2025     1:10 PM   Additional Questions   Roomed by STARR Soliz BSN     HPI: pt is here for a preop exam. Planned nephrolithotomy for retained ureteral stone, right.          3/9/2025   Pre-Op Questionnaire   Have you ever had a heart attack or stroke? No   Have you ever had surgery on your heart or blood vessels, such as a stent placement, a coronary artery bypass, or surgery on an artery in your head, neck, heart, or legs? (!) YES noted   Do you have chest pain with activity? No   Do you have a history of heart failure? No   Do you currently have a cold, bronchitis or symptoms of other infection? No   Do you have a cough, shortness of breath, or wheezing? No   Do you or anyone in your family have previous history of blood clots? No   Do you or does anyone in your family have a serious bleeding problem such as prolonged bleeding following surgeries or cuts? No   Have you ever had problems with anemia or been told to take iron pills? No   Have you had any abnormal blood loss such as black, tarry or bloody stools? No   Have you ever had a blood transfusion? (!) UNKNOWN   Are you willing to have a blood transfusion if it is medically needed before, during, or after your surgery? Yes   Have you or any of your relatives ever had problems with anesthesia? No   Do you have  sleep apnea, excessive snoring or daytime drowsiness? No   Do you have any artifical heart valves or other implanted medical devices like a pacemaker, defibrillator, or continuous glucose monitor? No   Do you have artificial joints? No   Are you allergic to latex? No         Patient Active Problem List    Diagnosis Date Noted    Hydronephrosis      Priority: Medium    Nephrolithiasis      Priority: Medium     with mild chronic hydronephrosis, MRI 12/24      Renal cyst, acquired, right      Priority: Medium     MRI pending, causing hydronephrosis and with nephrolithiasis      S/P CABG (coronary artery bypass graft) 10/16/2024     Priority: Medium    Coronary artery disease involving native heart without angina pectoris, unspecified vessel or lesion type 01/04/2023     Priority: Medium     s/p CABG x4 in 11/2022 (LIMA to LAD, SVG to OM, SVG to right PDA, and SVG to diagonal)      Benign essential hypertension 11/16/2022     Priority: Medium    Type 2 diabetes, HbA1C goal < 8% (H) 11/16/2022     Priority: Medium    Snoring 10/19/2015     Priority: Medium    Hyperlipidemia LDL goal <100 10/19/2015     Priority: Medium    Skin lesion 10/19/2015     Priority: Medium      Past Medical History:   Diagnosis Date    CAD (coronary artery disease)     s/p CABG x4 in 11/2022 (LIMA to LAD, SVG to OM, SVG to right PDA, and SVG to diagonal    Essential hypertension     Hydronephrosis     Hyperlipidemia LDL goal <100     Nephrolithiasis     with mild chronic hydronephrosis, MRI 12/24    Renal cyst, acquired, right     MRI negative for concerning lesion    S/P CABG (coronary artery bypass graft) 10/16/2024    Type 2 diabetes, HbA1C goal < 8% (H)      Past Surgical History:   Procedure Laterality Date    BYPASS GRAFT ARTERY CORONARY N/A 11/16/2022    Procedure: CORONARY ARTERY BYPASS GRAFT x 4 (LEFT INTERNAL MAMMARY ARTERY - LEFT ANTERIOR DESCENDING ARTERY; SAPHENOUS VEIN - OBTUSE MARGINAL ARTERY; SAPHENOUS VEIN - RIGHT POSTERIOR  "DESCENDING ARTERY; SAPHENOUS VEIN - DIAGONAL ARTERY) WITH ENDOSCOPIC SAPHENOUS VEIN HARVEST ON THE LEFT LOWER EXTREMITY, AND ON CARDIOPULMONARY PUMP OXYGENATOR  (INTRAOPERATIVE TRANSESOPHAGEAL ECHOCARDIOGRAM BY ANESTHE    CV CORONARY ANGIOGRAM N/A 2022    Procedure: Coronary Angiogram;  Surgeon: Truong Morales MD;  Location:  [facility]    CV INTRA AORTIC BALLOON N/A 2022    Procedure: Intraprocedure Aortic Balloon Pump Insertion;  Surgeon: Truong Morales MD;  Location:  [facility]    ELBOW SURGERY  2002    FINGER SURGERY      thumb    LEG SURGERY      femur    STRABISMUS SURGERY       Current Outpatient Medications   Medication Sig Dispense Refill    aspirin (ASA) 81 MG chewable tablet Take 1 tablet (81 mg) by mouth daily 90 tablet 3    empagliflozin (JARDIANCE) 10 MG TABS tablet TAKE 1 TABLET (10 MG) BY MOUTH DAILY. 90 tablet 0    lisinopril (ZESTRIL) 20 MG tablet Take 1 tablet (20 mg) by mouth 2 times daily 200 tablet 6    metoprolol succinate ER (TOPROL XL) 50 MG 24 hr tablet Take 1 tablet (50 mg) by mouth every morning 100 tablet 6    rosuvastatin (CRESTOR) 40 MG tablet Take 1 tablet (40 mg) by mouth daily 100 tablet 6       No Known Allergies     Social History     Tobacco Use    Smoking status: Former     Current packs/day: 0.00     Average packs/day: 0.5 packs/day for 45.9 years (22.9 ttl pk-yrs)     Types: Cigarettes     Start date:      Quit date: 11/15/2022     Years since quittin.3    Smokeless tobacco: Never   Substance Use Topics    Alcohol use: Yes     Comment: 2-4 beers per month.       History   Drug Use Unknown               Objective    /78 (BP Location: Left arm, Patient Position: Sitting, Cuff Size: Adult Regular)   Temp 96.8  F (36  C) (Tympanic)   Resp 11   Ht 1.727 m (5' 8\")   Wt 68.7 kg (151 lb 6.4 oz)   SpO2 97%   BMI 23.02 kg/m     Estimated body mass index is 23.02 kg/m  as calculated from the following:    Height " "as of this encounter: 1.727 m (5' 8\").    Weight as of this encounter: 68.7 kg (151 lb 6.4 oz).  Physical Exam  GENERAL: alert and no distress  RESP: lungs clear to auscultation - no rales, rhonchi or wheezes  CV: regular rate and rhythm, normal S1 S2, no S3 or S4, no murmur, click or rub, no peripheral edema  NEURO: Normal strength and tone, mentation intact and speech normal  PSYCH: mentation appears normal, affect normal/bright    Recent Labs   Lab Test 10/16/24  1245 05/10/24  1036   HGB 17.5 16.3    205    141   POTASSIUM 4.6 4.2   CR 0.95 1.01   A1C 6.4* 7.2*        Diagnostics  Labs pending at this time.  Results will be reviewed when available.   EKG: appears normal, NSR, old inferior infarct, unchanged from previous tracings    Revised Cardiac Risk Index (RCRI)  The patient has the following serious cardiovascular risks for perioperative complications:   - Coronary Artery Disease (MI, positive stress test, angina, Qs on EKG) = 1 point     RCRI Interpretation: 1 point: Class II (low risk - 0.9% complication rate)         Signed Electronically by: Armando Tompkins MD  A copy of this evaluation report is provided to the requesting physician.        "

## 2025-03-12 NOTE — PROGRESS NOTES
Preoperative Evaluation  [facility]  [address]  [facility]  [place] [address]  Phone: [phone]  Fax: [phone]  Primary Provider: Armando Tompkins MD  Pre-op Performing Provider: Armando Tompkins MD  Mar 12, 2025             3/9/2025   Surgical Information   What procedure is being done? Surgery to remove kidney stone   Facility or Hospital where procedure/surgery will be performed: [facility]   Who is doing the procedure / surgery? Dr. Maynor Lucas   Date of surgery / procedure: March 19th 2025   Time of surgery / procedure: 7:30 AM   Where do you plan to recover after surgery? at home alone     Fax number for surgical facility: Note does not need to be faxed, will be available electronically in Epic.    Assessment & Plan     The proposed surgical procedure is considered INTERMEDIATE risk.    (Z01.818) Preop general physical exam  (primary encounter diagnosis)  Comment: planned nephrolithotomy, chronic, retained right ureteral stone with moderate hydro. Asx   Plan: okay for procedure. All other conditions stable.    (N20.0) Right nephrolithiasis  Comment: noted incidentally.  Plan: see above.    (I50.22) Chronic systolic (congestive) heart failure (H)  Comment: last EF 45% noted on echo last year. No new events or symptoms.  Plan: on appropriate medications.    (I25.10) Coronary artery disease involving native heart without angina pectoris, unspecified vessel or lesion type  Comment: hx of MI, cabg in 2022  Plan: EKG 12-lead, tracing only        Stable.     (E11.59) Type 2 diabetes mellitus with other circulatory complication, without long-term current use of insulin (H)  Comment: well controlled  Plan: HEMOGLOBIN A1C        Recheck today.    (I10) Benign essential hypertension  Comment: controlled  Plan: Basic metabolic panel  (Ca, Cl, CO2, Creat,         Gluc, K, Na, BUN)        Will plan to continue on previous medication  without changes             - No identified additional risk factors other than previously addressed    Preoperative Medication Instructions  Antiplatelet or Anticoagulation Medication Instructions   - aspirin: Discontinue aspirin 7 days prior to procedure to reduce bleeding risk. It should be resumed postoperatively.     Additional Medication Instructions  Hold Jardiance for 2-3 days ahead of time.    Recommendation  Approval given to proceed with proposed procedure, without further diagnostic evaluation.    Mikhail Wallace is a 69 year old, presenting for the following:  Pre-Op Exam (Kidney stone- DOS 03/19/2025 at [facility] )          3/12/2025     1:10 PM   Additional Questions   Roomed by STARR Soliz BSN     HPI: pt is here for a preop exam. Planned nephrolithotomy for retained ureteral stone, right.          3/9/2025   Pre-Op Questionnaire   Have you ever had a heart attack or stroke? No   Have you ever had surgery on your heart or blood vessels, such as a stent placement, a coronary artery bypass, or surgery on an artery in your head, neck, heart, or legs? (!) YES noted   Do you have chest pain with activity? No   Do you have a history of heart failure? No   Do you currently have a cold, bronchitis or symptoms of other infection? No   Do you have a cough, shortness of breath, or wheezing? No   Do you or anyone in your family have previous history of blood clots? No   Do you or does anyone in your family have a serious bleeding problem such as prolonged bleeding following surgeries or cuts? No   Have you ever had problems with anemia or been told to take iron pills? No   Have you had any abnormal blood loss such as black, tarry or bloody stools? No   Have you ever had a blood transfusion? (!) UNKNOWN   Are you willing to have a blood transfusion if it is medically needed before, during, or after your surgery? Yes   Have you or any of your relatives ever had problems with anesthesia? No   Do you have  sleep apnea, excessive snoring or daytime drowsiness? No   Do you have any artifical heart valves or other implanted medical devices like a pacemaker, defibrillator, or continuous glucose monitor? No   Do you have artificial joints? No   Are you allergic to latex? No         Patient Active Problem List    Diagnosis Date Noted    Hydronephrosis      Priority: Medium    Nephrolithiasis      Priority: Medium     with mild chronic hydronephrosis, MRI 12/24      Renal cyst, acquired, right      Priority: Medium     MRI pending, causing hydronephrosis and with nephrolithiasis      S/P CABG (coronary artery bypass graft) 10/16/2024     Priority: Medium    Coronary artery disease involving native heart without angina pectoris, unspecified vessel or lesion type 01/04/2023     Priority: Medium     s/p CABG x4 in 11/2022 (LIMA to LAD, SVG to OM, SVG to right PDA, and SVG to diagonal)      Benign essential hypertension 11/16/2022     Priority: Medium    Type 2 diabetes, HbA1C goal < 8% (H) 11/16/2022     Priority: Medium    Snoring 10/19/2015     Priority: Medium    Hyperlipidemia LDL goal <100 10/19/2015     Priority: Medium    Skin lesion 10/19/2015     Priority: Medium      Past Medical History:   Diagnosis Date    CAD (coronary artery disease)     s/p CABG x4 in 11/2022 (LIMA to LAD, SVG to OM, SVG to right PDA, and SVG to diagonal    Essential hypertension     Hydronephrosis     Hyperlipidemia LDL goal <100     Nephrolithiasis     with mild chronic hydronephrosis, MRI 12/24    Renal cyst, acquired, right     MRI negative for concerning lesion    S/P CABG (coronary artery bypass graft) 10/16/2024    Type 2 diabetes, HbA1C goal < 8% (H)      Past Surgical History:   Procedure Laterality Date    BYPASS GRAFT ARTERY CORONARY N/A 11/16/2022    Procedure: CORONARY ARTERY BYPASS GRAFT x 4 (LEFT INTERNAL MAMMARY ARTERY - LEFT ANTERIOR DESCENDING ARTERY; SAPHENOUS VEIN - OBTUSE MARGINAL ARTERY; SAPHENOUS VEIN - RIGHT POSTERIOR  "DESCENDING ARTERY; SAPHENOUS VEIN - DIAGONAL ARTERY) WITH ENDOSCOPIC SAPHENOUS VEIN HARVEST ON THE LEFT LOWER EXTREMITY, AND ON CARDIOPULMONARY PUMP OXYGENATOR  (INTRAOPERATIVE TRANSESOPHAGEAL ECHOCARDIOGRAM BY ANESTHE    CV CORONARY ANGIOGRAM N/A 2022    Procedure: Coronary Angiogram;  Surgeon: Truong Morales MD;  Location:  [facility] CARDI[facility] CATH LAB    CV INTRA AORTIC BALLOON N/A 2022    Procedure: Intraprocedure Aortic Balloon Pump Insertion;  Surgeon: Truong Morales MD;  Location:  HEART CARDIAC CATH LAB    ELBOW SURGERY  2002    FINGER SURGERY      thumb    LEG SURGERY      femur    STRABISMUS SURGERY       Current Outpatient Medications   Medication Sig Dispense Refill    aspirin (ASA) 81 MG chewable tablet Take 1 tablet (81 mg) by mouth daily 90 tablet 3    empagliflozin (JARDIANCE) 10 MG TABS tablet TAKE 1 TABLET (10 MG) BY MOUTH DAILY. 90 tablet 0    lisinopril (ZESTRIL) 20 MG tablet Take 1 tablet (20 mg) by mouth 2 times daily 200 tablet 6    metoprolol succinate ER (TOPROL XL) 50 MG 24 hr tablet Take 1 tablet (50 mg) by mouth every morning 100 tablet 6    rosuvastatin (CRESTOR) 40 MG tablet Take 1 tablet (40 mg) by mouth daily 100 tablet 6       No Known Allergies     Social History     Tobacco Use    Smoking status: Former     Current packs/day: 0.00     Average packs/day: 0.5 packs/day for 45.9 years (22.9 ttl pk-yrs)     Types: Cigarettes     Start date:      Quit date: 11/15/2022     Years since quittin.3    Smokeless tobacco: Never   Substance Use Topics    Alcohol use: Yes     Comment: 2-4 beers per month.       History   Drug Use Unknown               Objective    /78 (BP Location: Left arm, Patient Position: Sitting, Cuff Size: Adult Regular)   Temp 96.8  F (36  C) (Tympanic)   Resp 11   Ht 1.727 m (5' 8\")   Wt 68.7 kg (151 lb 6.4 oz)   SpO2 97%   BMI 23.02 kg/m     Estimated body mass index is 23.02 kg/m  as calculated from the following:    Height " "as of this encounter: 1.727 m (5' 8\").    Weight as of this encounter: 68.7 kg (151 lb 6.4 oz).  Physical Exam  GENERAL: alert and no distress  RESP: lungs clear to auscultation - no rales, rhonchi or wheezes  CV: regular rate and rhythm, normal S1 S2, no S3 or S4, no murmur, click or rub, no peripheral edema  NEURO: Normal strength and tone, mentation intact and speech normal  PSYCH: mentation appears normal, affect normal/bright    Recent Labs   Lab Test 10/16/24  1245 05/10/24  1036   HGB 17.5 16.3    205    141   POTASSIUM 4.6 4.2   CR 0.95 1.01   A1C 6.4* 7.2*        Diagnostics  Labs pending at this time.  Results will be reviewed when available.   EKG: appears normal, NSR, old inferior infarct, unchanged from previous tracings    Revised Cardiac Risk Index (RCRI)  The patient has the following serious cardiovascular risks for perioperative complications:   - Coronary Artery Disease (MI, positive stress test, angina, Qs on EKG) = 1 point     RCRI Interpretation: 1 point: Class II (low risk - 0.9% complication rate)         Signed Electronically by: Armando Tompkins MD  A copy of this evaluation report is provided to the requesting physician.        "

## 2025-03-13 LAB
ATRIAL RATE - MUSE: 62 BPM
BACTERIA UR CULT: NO GROWTH
DIASTOLIC BLOOD PRESSURE - MUSE: NORMAL MMHG
INTERPRETATION ECG - MUSE: NORMAL
P AXIS - MUSE: 75 DEGREES
PR INTERVAL - MUSE: 194 MS
QRS DURATION - MUSE: 96 MS
QT - MUSE: 436 MS
QTC - MUSE: 442 MS
R AXIS - MUSE: -20 DEGREES
SYSTOLIC BLOOD PRESSURE - MUSE: NORMAL MMHG
T AXIS - MUSE: 151 DEGREES
VENTRICULAR RATE- MUSE: 62 BPM

## 2025-03-13 NOTE — TELEPHONE ENCOUNTER
RNCC called pt back and completed surgery teaching-  Procedure: PERC    Date: 03/19/2025  Provider: Shayy     Post op appt: lakhwinder    H&P: yes completed-  UA/UC: yes completed and no growth    Medications: yes-discussed per his pre-op will need to hold Jardiance which is oral 2-3 days ahead of surgery and also his aspirin 3 days ahead  Soap: yes  Reviewed when to start clear liquids and when to start NPO: yes  : yes  24 hour observation: yes will stay 23 hrs post op    Pt or family member expressed understanding: yes    Paige Eng RN  3/13/2025  2:06 PM

## 2025-03-15 LAB
ANION GAP SERPL CALCULATED.3IONS-SCNC: 12 MMOL/L (ref 7–15)
BUN SERPL-MCNC: 17.5 MG/DL (ref 8–23)
CALCIUM SERPL-MCNC: 9.7 MG/DL (ref 8.8–10.4)
CHLORIDE SERPL-SCNC: 104 MMOL/L (ref 98–107)
CREAT SERPL-MCNC: 0.91 MG/DL (ref 0.67–1.17)
EGFRCR SERPLBLD CKD-EPI 2021: >90 ML/MIN/1.73M2
GLUCOSE SERPL-MCNC: 106 MG/DL (ref 70–99)
HCO3 SERPL-SCNC: 25 MMOL/L (ref 22–29)
POTASSIUM SERPL-SCNC: 4.1 MMOL/L (ref 3.4–5.3)
SODIUM SERPL-SCNC: 141 MMOL/L (ref 135–145)

## 2025-03-19 ENCOUNTER — ANESTHESIA EVENT (OUTPATIENT)
Dept: SURGERY | Facility: CLINIC | Age: 70
End: 2025-03-19
Payer: COMMERCIAL

## 2025-03-19 ENCOUNTER — APPOINTMENT (OUTPATIENT)
Dept: GENERAL RADIOLOGY | Facility: CLINIC | Age: 70
End: 2025-03-19
Attending: UROLOGY
Payer: COMMERCIAL

## 2025-03-19 ENCOUNTER — ANESTHESIA (OUTPATIENT)
Dept: SURGERY | Facility: CLINIC | Age: 70
End: 2025-03-19
Payer: COMMERCIAL

## 2025-03-19 ENCOUNTER — HOSPITAL ENCOUNTER (OUTPATIENT)
Facility: CLINIC | Age: 70
Discharge: HOME | End: 2025-03-20
Attending: UROLOGY | Admitting: UROLOGY
Payer: COMMERCIAL

## 2025-03-19 ENCOUNTER — APPOINTMENT (OUTPATIENT)
Dept: CT IMAGING | Facility: CLINIC | Age: 70
End: 2025-03-19
Payer: COMMERCIAL

## 2025-03-19 DIAGNOSIS — N20.0 NEPHROLITHIASIS: Primary | ICD-10-CM

## 2025-03-19 LAB
GLUCOSE BLDC GLUCOMTR-MCNC: 143 MG/DL (ref 70–99)
GLUCOSE BLDC GLUCOMTR-MCNC: 166 MG/DL (ref 70–99)

## 2025-03-19 PROCEDURE — 74420 UROGRAPHY RTRGR +-KUB: CPT | Mod: 26 | Performed by: UROLOGY

## 2025-03-19 PROCEDURE — 360N000084 HC SURGERY LEVEL 4 W/ FLUORO, PER MIN: Performed by: UROLOGY

## 2025-03-19 PROCEDURE — 250N000013 HC RX MED GY IP 250 OP 250 PS 637: Performed by: UROLOGY

## 2025-03-19 PROCEDURE — 87070 CULTURE OTHR SPECIMN AEROBIC: CPT | Performed by: UROLOGY

## 2025-03-19 PROCEDURE — 999N000180 XR SURGERY CARM FLUORO LESS THAN 5 MIN

## 2025-03-19 PROCEDURE — C1894 INTRO/SHEATH, NON-LASER: HCPCS | Performed by: UROLOGY

## 2025-03-19 PROCEDURE — C1887 CATHETER, GUIDING: HCPCS | Performed by: UROLOGY

## 2025-03-19 PROCEDURE — 272N000002 HC OR SUPPLY OTHER OPNP: Performed by: UROLOGY

## 2025-03-19 PROCEDURE — 272N000001 HC OR GENERAL SUPPLY STERILE: Performed by: UROLOGY

## 2025-03-19 PROCEDURE — 250N000009 HC RX 250

## 2025-03-19 PROCEDURE — 74176 CT ABD & PELVIS W/O CONTRAST: CPT

## 2025-03-19 PROCEDURE — 50081 PERQ NL/PL LITHOTRP CPLX>2CM: CPT | Mod: 22 | Performed by: UROLOGY

## 2025-03-19 PROCEDURE — 250N000025 HC SEVOFLURANE, PER MIN: Performed by: UROLOGY

## 2025-03-19 PROCEDURE — 74176 CT ABD & PELVIS W/O CONTRAST: CPT | Mod: 26 | Performed by: RADIOLOGY

## 2025-03-19 PROCEDURE — 255N000002 HC RX 255 OP 636: Performed by: UROLOGY

## 2025-03-19 PROCEDURE — 710N000010 HC RECOVERY PHASE 1, LEVEL 2, PER MIN: Performed by: UROLOGY

## 2025-03-19 PROCEDURE — 258N000003 HC RX IP 258 OP 636: Performed by: UROLOGY

## 2025-03-19 PROCEDURE — C1725 CATH, TRANSLUMIN NON-LASER: HCPCS | Performed by: UROLOGY

## 2025-03-19 PROCEDURE — 250N000011 HC RX IP 250 OP 636

## 2025-03-19 PROCEDURE — 258N000003 HC RX IP 258 OP 636

## 2025-03-19 PROCEDURE — 250N000011 HC RX IP 250 OP 636: Performed by: NURSE ANESTHETIST, CERTIFIED REGISTERED

## 2025-03-19 PROCEDURE — 258N000003 HC RX IP 258 OP 636: Performed by: NURSE ANESTHETIST, CERTIFIED REGISTERED

## 2025-03-19 PROCEDURE — 250N000013 HC RX MED GY IP 250 OP 250 PS 637

## 2025-03-19 PROCEDURE — 50437 DILAT XST TRC NEW ACCESS RCS: CPT | Mod: 59 | Performed by: UROLOGY

## 2025-03-19 PROCEDURE — 250N000011 HC RX IP 250 OP 636: Performed by: UROLOGY

## 2025-03-19 PROCEDURE — C2617 STENT, NON-COR, TEM W/O DEL: HCPCS | Performed by: UROLOGY

## 2025-03-19 PROCEDURE — 999N000141 HC STATISTIC PRE-PROCEDURE NURSING ASSESSMENT: Performed by: UROLOGY

## 2025-03-19 PROCEDURE — 370N000017 HC ANESTHESIA TECHNICAL FEE, PER MIN: Performed by: UROLOGY

## 2025-03-19 PROCEDURE — 250N000009 HC RX 250: Performed by: NURSE ANESTHETIST, CERTIFIED REGISTERED

## 2025-03-19 PROCEDURE — C1769 GUIDE WIRE: HCPCS | Performed by: UROLOGY

## 2025-03-19 PROCEDURE — 82962 GLUCOSE BLOOD TEST: CPT

## 2025-03-19 PROCEDURE — 82365 CALCULUS SPECTROSCOPY: CPT | Performed by: UROLOGY

## 2025-03-19 PROCEDURE — 250N000011 HC RX IP 250 OP 636: Mod: JZ | Performed by: UROLOGY

## 2025-03-19 DEVICE — BLACK SILICONE FILIFORM DOUBLE PIGTAIL URETERAL STENT SET
Type: IMPLANTABLE DEVICE | Site: URETER | Status: FUNCTIONAL
Brand: COOK

## 2025-03-19 RX ORDER — PROPOFOL 10 MG/ML
INJECTION, EMULSION INTRAVENOUS PRN
Status: DISCONTINUED | OUTPATIENT
Start: 2025-03-19 | End: 2025-03-19

## 2025-03-19 RX ORDER — ONDANSETRON 4 MG/1
4 TABLET, ORALLY DISINTEGRATING ORAL EVERY 6 HOURS PRN
Status: DISCONTINUED | OUTPATIENT
Start: 2025-03-19 | End: 2025-03-20 | Stop reason: HOSPADM

## 2025-03-19 RX ORDER — BISACODYL 10 MG
10 SUPPOSITORY, RECTAL RECTAL DAILY PRN
Status: DISCONTINUED | OUTPATIENT
Start: 2025-03-22 | End: 2025-03-20 | Stop reason: HOSPADM

## 2025-03-19 RX ORDER — SODIUM CHLORIDE 9 MG/ML
INJECTION, SOLUTION INTRAVENOUS CONTINUOUS
Status: DISCONTINUED | OUTPATIENT
Start: 2025-03-19 | End: 2025-03-20

## 2025-03-19 RX ORDER — GINSENG 100 MG
CAPSULE ORAL 3 TIMES DAILY
Status: DISCONTINUED | OUTPATIENT
Start: 2025-03-19 | End: 2025-03-20 | Stop reason: HOSPADM

## 2025-03-19 RX ORDER — DEXAMETHASONE SODIUM PHOSPHATE 4 MG/ML
INJECTION, SOLUTION INTRA-ARTICULAR; INTRALESIONAL; INTRAMUSCULAR; INTRAVENOUS; SOFT TISSUE PRN
Status: DISCONTINUED | OUTPATIENT
Start: 2025-03-19 | End: 2025-03-19

## 2025-03-19 RX ORDER — ONDANSETRON 2 MG/ML
4 INJECTION INTRAMUSCULAR; INTRAVENOUS EVERY 30 MIN PRN
Status: DISCONTINUED | OUTPATIENT
Start: 2025-03-19 | End: 2025-03-19 | Stop reason: HOSPADM

## 2025-03-19 RX ORDER — SODIUM CHLORIDE, SODIUM LACTATE, POTASSIUM CHLORIDE, CALCIUM CHLORIDE 600; 310; 30; 20 MG/100ML; MG/100ML; MG/100ML; MG/100ML
INJECTION, SOLUTION INTRAVENOUS CONTINUOUS PRN
Status: DISCONTINUED | OUTPATIENT
Start: 2025-03-19 | End: 2025-03-19

## 2025-03-19 RX ORDER — BUPIVACAINE HYDROCHLORIDE 2.5 MG/ML
INJECTION, SOLUTION EPIDURAL; INFILTRATION; INTRACAUDAL; PERINEURAL PRN
Status: DISCONTINUED | OUTPATIENT
Start: 2025-03-19 | End: 2025-03-19 | Stop reason: HOSPADM

## 2025-03-19 RX ORDER — AMPICILLIN 2 G/1
2 INJECTION, POWDER, FOR SOLUTION INTRAVENOUS EVERY 6 HOURS
Status: DISCONTINUED | OUTPATIENT
Start: 2025-03-19 | End: 2025-03-20 | Stop reason: HOSPADM

## 2025-03-19 RX ORDER — HYDROMORPHONE HYDROCHLORIDE 1 MG/ML
0.2 INJECTION, SOLUTION INTRAMUSCULAR; INTRAVENOUS; SUBCUTANEOUS EVERY 5 MIN PRN
Status: DISCONTINUED | OUTPATIENT
Start: 2025-03-19 | End: 2025-03-19 | Stop reason: HOSPADM

## 2025-03-19 RX ORDER — AMPICILLIN 2 G/1
2 INJECTION, POWDER, FOR SOLUTION INTRAVENOUS ONCE
Status: COMPLETED | OUTPATIENT
Start: 2025-03-19 | End: 2025-03-19

## 2025-03-19 RX ORDER — EPHEDRINE SULFATE 50 MG/ML
INJECTION, SOLUTION INTRAMUSCULAR; INTRAVENOUS; SUBCUTANEOUS PRN
Status: DISCONTINUED | OUTPATIENT
Start: 2025-03-19 | End: 2025-03-19

## 2025-03-19 RX ORDER — FENTANYL CITRATE 50 UG/ML
25 INJECTION, SOLUTION INTRAMUSCULAR; INTRAVENOUS EVERY 5 MIN PRN
Status: DISCONTINUED | OUTPATIENT
Start: 2025-03-19 | End: 2025-03-19 | Stop reason: HOSPADM

## 2025-03-19 RX ORDER — ONDANSETRON 2 MG/ML
INJECTION INTRAMUSCULAR; INTRAVENOUS PRN
Status: DISCONTINUED | OUTPATIENT
Start: 2025-03-19 | End: 2025-03-19

## 2025-03-19 RX ORDER — METHOCARBAMOL 500 MG/1
250 TABLET ORAL EVERY 6 HOURS PRN
Status: DISCONTINUED | OUTPATIENT
Start: 2025-03-19 | End: 2025-03-20 | Stop reason: HOSPADM

## 2025-03-19 RX ORDER — ACETAMINOPHEN 650 MG/1
650 SUPPOSITORY RECTAL ONCE
Status: COMPLETED | OUTPATIENT
Start: 2025-03-19 | End: 2025-03-19

## 2025-03-19 RX ORDER — FENTANYL CITRATE 50 UG/ML
INJECTION, SOLUTION INTRAMUSCULAR; INTRAVENOUS PRN
Status: DISCONTINUED | OUTPATIENT
Start: 2025-03-19 | End: 2025-03-19

## 2025-03-19 RX ORDER — ACETAMINOPHEN 325 MG/1
975 TABLET ORAL ONCE
Status: COMPLETED | OUTPATIENT
Start: 2025-03-19 | End: 2025-03-19

## 2025-03-19 RX ORDER — ACETAMINOPHEN 325 MG/1
975 TABLET ORAL EVERY 8 HOURS
Status: DISCONTINUED | OUTPATIENT
Start: 2025-03-19 | End: 2025-03-20 | Stop reason: HOSPADM

## 2025-03-19 RX ORDER — POLYETHYLENE GLYCOL 3350 17 G/17G
17 POWDER, FOR SOLUTION ORAL DAILY
Status: DISCONTINUED | OUTPATIENT
Start: 2025-03-20 | End: 2025-03-20 | Stop reason: HOSPADM

## 2025-03-19 RX ORDER — METOPROLOL SUCCINATE 50 MG/1
50 TABLET, EXTENDED RELEASE ORAL EVERY MORNING
Status: DISCONTINUED | OUTPATIENT
Start: 2025-03-20 | End: 2025-03-20 | Stop reason: HOSPADM

## 2025-03-19 RX ORDER — PROCHLORPERAZINE MALEATE 5 MG/1
5 TABLET ORAL EVERY 6 HOURS PRN
Status: DISCONTINUED | OUTPATIENT
Start: 2025-03-19 | End: 2025-03-20 | Stop reason: HOSPADM

## 2025-03-19 RX ORDER — LIDOCAINE 40 MG/G
CREAM TOPICAL
Status: DISCONTINUED | OUTPATIENT
Start: 2025-03-19 | End: 2025-03-20 | Stop reason: HOSPADM

## 2025-03-19 RX ORDER — LIDOCAINE HYDROCHLORIDE 20 MG/ML
INJECTION, SOLUTION INFILTRATION; PERINEURAL PRN
Status: DISCONTINUED | OUTPATIENT
Start: 2025-03-19 | End: 2025-03-19

## 2025-03-19 RX ORDER — HYDROMORPHONE HYDROCHLORIDE 1 MG/ML
0.4 INJECTION, SOLUTION INTRAMUSCULAR; INTRAVENOUS; SUBCUTANEOUS EVERY 5 MIN PRN
Status: DISCONTINUED | OUTPATIENT
Start: 2025-03-19 | End: 2025-03-19 | Stop reason: HOSPADM

## 2025-03-19 RX ORDER — ONDANSETRON 2 MG/ML
4 INJECTION INTRAMUSCULAR; INTRAVENOUS EVERY 6 HOURS PRN
Status: DISCONTINUED | OUTPATIENT
Start: 2025-03-19 | End: 2025-03-20 | Stop reason: HOSPADM

## 2025-03-19 RX ORDER — IBUPROFEN 600 MG/1
600 TABLET, FILM COATED ORAL EVERY 6 HOURS PRN
Status: DISCONTINUED | OUTPATIENT
Start: 2025-03-19 | End: 2025-03-20 | Stop reason: HOSPADM

## 2025-03-19 RX ORDER — KETOROLAC TROMETHAMINE 30 MG/ML
INJECTION, SOLUTION INTRAMUSCULAR; INTRAVENOUS PRN
Status: DISCONTINUED | OUTPATIENT
Start: 2025-03-19 | End: 2025-03-19

## 2025-03-19 RX ORDER — ONDANSETRON 4 MG/1
4 TABLET, ORALLY DISINTEGRATING ORAL EVERY 30 MIN PRN
Status: DISCONTINUED | OUTPATIENT
Start: 2025-03-19 | End: 2025-03-19 | Stop reason: HOSPADM

## 2025-03-19 RX ORDER — TAMSULOSIN HYDROCHLORIDE 0.4 MG/1
0.4 CAPSULE ORAL DAILY
Status: DISCONTINUED | OUTPATIENT
Start: 2025-03-19 | End: 2025-03-20 | Stop reason: HOSPADM

## 2025-03-19 RX ORDER — SODIUM CHLORIDE, SODIUM LACTATE, POTASSIUM CHLORIDE, CALCIUM CHLORIDE 600; 310; 30; 20 MG/100ML; MG/100ML; MG/100ML; MG/100ML
INJECTION, SOLUTION INTRAVENOUS CONTINUOUS
Status: DISCONTINUED | OUTPATIENT
Start: 2025-03-19 | End: 2025-03-19 | Stop reason: HOSPADM

## 2025-03-19 RX ORDER — LISINOPRIL 20 MG/1
20 TABLET ORAL 2 TIMES DAILY
Status: DISCONTINUED | OUTPATIENT
Start: 2025-03-19 | End: 2025-03-20 | Stop reason: HOSPADM

## 2025-03-19 RX ORDER — NALOXONE HYDROCHLORIDE 0.4 MG/ML
0.1 INJECTION, SOLUTION INTRAMUSCULAR; INTRAVENOUS; SUBCUTANEOUS
Status: DISCONTINUED | OUTPATIENT
Start: 2025-03-19 | End: 2025-03-19 | Stop reason: HOSPADM

## 2025-03-19 RX ORDER — ROSUVASTATIN CALCIUM 20 MG/1
40 TABLET, COATED ORAL DAILY
Status: DISCONTINUED | OUTPATIENT
Start: 2025-03-20 | End: 2025-03-20 | Stop reason: HOSPADM

## 2025-03-19 RX ORDER — AMOXICILLIN 250 MG
1 CAPSULE ORAL 2 TIMES DAILY
Status: DISCONTINUED | OUTPATIENT
Start: 2025-03-19 | End: 2025-03-20 | Stop reason: HOSPADM

## 2025-03-19 RX ORDER — FENTANYL CITRATE 50 UG/ML
50 INJECTION, SOLUTION INTRAMUSCULAR; INTRAVENOUS EVERY 5 MIN PRN
Status: DISCONTINUED | OUTPATIENT
Start: 2025-03-19 | End: 2025-03-19 | Stop reason: HOSPADM

## 2025-03-19 RX ORDER — DEXAMETHASONE SODIUM PHOSPHATE 4 MG/ML
4 INJECTION, SOLUTION INTRA-ARTICULAR; INTRALESIONAL; INTRAMUSCULAR; INTRAVENOUS; SOFT TISSUE
Status: DISCONTINUED | OUTPATIENT
Start: 2025-03-19 | End: 2025-03-19 | Stop reason: HOSPADM

## 2025-03-19 RX ADMIN — FENTANYL CITRATE 50 MCG: 50 INJECTION INTRAMUSCULAR; INTRAVENOUS at 07:46

## 2025-03-19 RX ADMIN — ONDANSETRON 4 MG: 2 INJECTION INTRAMUSCULAR; INTRAVENOUS at 10:01

## 2025-03-19 RX ADMIN — PHENYLEPHRINE HYDROCHLORIDE 150 MCG: 10 INJECTION INTRAVENOUS at 10:18

## 2025-03-19 RX ADMIN — SENNOSIDES AND DOCUSATE SODIUM 1 TABLET: 50; 8.6 TABLET ORAL at 21:01

## 2025-03-19 RX ADMIN — EPHEDRINE SULFATE 10 MG: 5 INJECTION INTRAVENOUS at 08:32

## 2025-03-19 RX ADMIN — GENTAMICIN SULFATE 340 MG: 40 INJECTION, SOLUTION INTRAMUSCULAR; INTRAVENOUS at 08:00

## 2025-03-19 RX ADMIN — PROPOFOL 150 MG: 10 INJECTION, EMULSION INTRAVENOUS at 07:46

## 2025-03-19 RX ADMIN — ACETAMINOPHEN 975 MG: 325 TABLET, FILM COATED ORAL at 07:05

## 2025-03-19 RX ADMIN — SODIUM CHLORIDE, SODIUM LACTATE, POTASSIUM CHLORIDE, AND CALCIUM CHLORIDE: .6; .31; .03; .02 INJECTION, SOLUTION INTRAVENOUS at 07:36

## 2025-03-19 RX ADMIN — LIDOCAINE HYDROCHLORIDE 100 MG: 20 INJECTION, SOLUTION INFILTRATION; PERINEURAL at 07:46

## 2025-03-19 RX ADMIN — AMPICILLIN SODIUM 2 G: 2 INJECTION, POWDER, FOR SOLUTION INTRAMUSCULAR; INTRAVENOUS at 07:51

## 2025-03-19 RX ADMIN — SODIUM CHLORIDE, SODIUM LACTATE, POTASSIUM CHLORIDE, AND CALCIUM CHLORIDE: .6; .31; .03; .02 INJECTION, SOLUTION INTRAVENOUS at 10:11

## 2025-03-19 RX ADMIN — EPHEDRINE SULFATE 5 MG: 5 INJECTION INTRAVENOUS at 09:18

## 2025-03-19 RX ADMIN — MIDAZOLAM 2 MG: 1 INJECTION INTRAMUSCULAR; INTRAVENOUS at 07:36

## 2025-03-19 RX ADMIN — PHENYLEPHRINE HYDROCHLORIDE 150 MCG: 10 INJECTION INTRAVENOUS at 09:27

## 2025-03-19 RX ADMIN — SODIUM CHLORIDE: 0.9 INJECTION, SOLUTION INTRAVENOUS at 15:14

## 2025-03-19 RX ADMIN — EPHEDRINE SULFATE 5 MG: 5 INJECTION INTRAVENOUS at 09:12

## 2025-03-19 RX ADMIN — SUGAMMADEX 200 MG: 100 INJECTION, SOLUTION INTRAVENOUS at 10:20

## 2025-03-19 RX ADMIN — EPHEDRINE SULFATE 5 MG: 5 INJECTION INTRAVENOUS at 09:10

## 2025-03-19 RX ADMIN — BACITRACIN: 500 OINTMENT TOPICAL at 21:04

## 2025-03-19 RX ADMIN — AMPICILLIN SODIUM 2 G: 2 INJECTION, POWDER, FOR SOLUTION INTRAMUSCULAR; INTRAVENOUS at 16:12

## 2025-03-19 RX ADMIN — Medication 50 MG: at 07:46

## 2025-03-19 RX ADMIN — LISINOPRIL 20 MG: 20 TABLET ORAL at 21:01

## 2025-03-19 RX ADMIN — BACITRACIN: 500 OINTMENT TOPICAL at 15:17

## 2025-03-19 RX ADMIN — AMPICILLIN SODIUM 2 G: 2 INJECTION, POWDER, FOR SOLUTION INTRAMUSCULAR; INTRAVENOUS at 21:01

## 2025-03-19 RX ADMIN — PHENYLEPHRINE HYDROCHLORIDE 100 MCG: 10 INJECTION INTRAVENOUS at 08:06

## 2025-03-19 RX ADMIN — TAMSULOSIN HYDROCHLORIDE 0.4 MG: 0.4 CAPSULE ORAL at 15:14

## 2025-03-19 RX ADMIN — DEXAMETHASONE SODIUM PHOSPHATE 8 MG: 4 INJECTION, SOLUTION INTRAMUSCULAR; INTRAVENOUS at 07:46

## 2025-03-19 RX ADMIN — FENTANYL CITRATE 50 MCG: 50 INJECTION INTRAMUSCULAR; INTRAVENOUS at 08:44

## 2025-03-19 RX ADMIN — PHENYLEPHRINE HYDROCHLORIDE 100 MCG: 10 INJECTION INTRAVENOUS at 07:48

## 2025-03-19 RX ADMIN — KETOROLAC TROMETHAMINE 30 MG: 30 INJECTION, SOLUTION INTRAMUSCULAR at 10:04

## 2025-03-19 ASSESSMENT — ACTIVITIES OF DAILY LIVING (ADL)
ADLS_ACUITY_SCORE: 48
ADLS_ACUITY_SCORE: 47
ADLS_ACUITY_SCORE: 47
ADLS_ACUITY_SCORE: 45
ADLS_ACUITY_SCORE: 45
ADLS_ACUITY_SCORE: 47
ADLS_ACUITY_SCORE: 45
ADLS_ACUITY_SCORE: 47
ADLS_ACUITY_SCORE: 45
ADLS_ACUITY_SCORE: 47
ADLS_ACUITY_SCORE: 47

## 2025-03-19 NOTE — ANESTHESIA PREPROCEDURE EVALUATION
Anesthesia Pre-Procedure Evaluation    Patient: Rodrigo Lanier   MRN: [id number] : 1955        Procedure : Procedure(s):  NEPHROLITHOTOMY, PERCUTANEOUS, USING HOLMIUM LASER          Past Medical History:   Diagnosis Date    CAD (coronary artery disease)     s/p CABG x4 in 2022 (LIMA to LAD, SVG to OM, SVG to right PDA, and SVG to diagonal    Essential hypertension     Hydronephrosis     Hyperlipidemia LDL goal <100     Nephrolithiasis     with mild chronic hydronephrosis, MRI     Renal cyst, acquired, right     MRI negative for concerning lesion    S/P CABG (coronary artery bypass graft) 10/16/2024    Type 2 diabetes, HbA1C goal < 8% (H)       Past Surgical History:   Procedure Laterality Date    BYPASS GRAFT ARTERY CORONARY N/A 2022    Procedure: CORONARY ARTERY BYPASS GRAFT x 4 (LEFT INTERNAL MAMMARY ARTERY - LEFT ANTERIOR DESCENDING ARTERY; SAPHENOUS VEIN - OBTUSE MARGINAL ARTERY; SAPHENOUS VEIN - RIGHT POSTERIOR DESCENDING ARTERY; SAPHENOUS VEIN - DIAGONAL ARTERY) WITH ENDOSCOPIC SAPHENOUS VEIN HARVEST ON THE LEFT LOWER EXTREMITY, AND ON CARDIOPULMONARY PUMP OXYGENATOR  (INTRAOPERATIVE TRANSESOPHAGEAL ECHOCARDIOGRAM BY ANESTHE    CV CORONARY ANGIOGRAM N/A 2022    Procedure: Coronary Angiogram;  Surgeon: Truong Morales MD;  Location: [facility] CARDIAC CATH LAB    CV INTRA AORTIC BALLOON N/A 2022    Procedure: Intraprocedure Aortic Balloon Pump Insertion;  Surgeon: Truong Morales MD;  Location: [facility] CARDIAC CATH LAB    ELBOW SURGERY      FINGER SURGERY      thumb    LEG SURGERY      femur    STRABISMUS SURGERY        No Known Allergies   Social History     Tobacco Use    Smoking status: Some Days     Current packs/day: 0.00     Average packs/day: 0.5 packs/day for 45.9 years (22.9 ttl pk-yrs)     Types: Cigarettes     Start date:      Last attempt to quit: 11/15/2022     Years since quittin.3    Smokeless tobacco: Never   Substance Use Topics     "Alcohol use: Yes     Comment: 2-4 beers per month.      Wt Readings from Last 1 Encounters:   03/19/25 69.3 kg (152 lb 12.5 oz)        Anesthesia Evaluation   Pt has had prior anesthetic. Type: General and MAC.        ROS/MED HX  ENT/Pulmonary:       Neurologic:  - neg neurologic ROS     Cardiovascular:     (+)  hypertension- -  CAD -  - -      CHF                                METS/Exercise Tolerance:     Hematologic:       Musculoskeletal:       GI/Hepatic:       Renal/Genitourinary:     (+) renal disease,             Endo:     (+)  type II DM,                    Psychiatric/Substance Use:  - neg psychiatric ROS     Infectious Disease:       Malignancy:       Other:            Physical Exam    Airway        Mallampati: II   TM distance: > 3 FB   Neck ROM: full   Mouth opening: > 3 cm    Respiratory Devices and Support         Dental       (+) Edentulous      Cardiovascular   cardiovascular exam normal          Pulmonary                   OUTSIDE LABS:  CBC:   Lab Results   Component Value Date    WBC 8.5 03/12/2025    WBC 10.0 10/16/2024    HGB 17.2 03/12/2025    HGB 17.5 10/16/2024    HCT 49.4 03/12/2025    HCT 52.9 10/16/2024     03/12/2025     10/16/2024     BMP:   Lab Results   Component Value Date     03/12/2025     10/16/2024    POTASSIUM 4.1 03/12/2025    POTASSIUM 4.6 10/16/2024    CHLORIDE 104 03/12/2025    CHLORIDE 105 10/16/2024    CO2 25 03/12/2025    CO2 28 10/16/2024    BUN 17.5 03/12/2025    BUN 21.4 10/16/2024    CR 0.91 03/12/2025    CR 0.95 10/16/2024     (H) 03/19/2025     (H) 03/12/2025     COAGS:   Lab Results   Component Value Date    PTT 32 11/16/2022    INR 1.28 (H) 11/16/2022    FIBR 233 11/16/2022     POC: No results found for: \"BGM\", \"HCG\", \"HCGS\"  HEPATIC:   Lab Results   Component Value Date    ALBUMIN 4.8 10/16/2024    PROTTOTAL 8.1 10/16/2024    ALT 21 10/16/2024    AST 24 10/16/2024    ALKPHOS 78 10/16/2024    BILITOTAL 0.6 10/16/2024 "     OTHER:   Lab Results   Component Value Date    PH 7.42 11/17/2022    LACT 1.3 11/17/2022    A1C 6.6 (H) 03/12/2025    LINDA 9.7 03/12/2025    PHOS 2.9 11/20/2022    MAG 2.0 11/18/2022       Anesthesia Plan    ASA Status:  3    NPO Status:  NPO Appropriate    Anesthesia Type: General.     - Airway: ETT      Maintenance: Balanced.        Consents    Anesthesia Plan(s) and associated risks, benefits, and realistic alternatives discussed. Questions answered and patient/representative(s) expressed understanding.     - Discussed: Risks, Benefits and Alternatives for the PROCEDURE were discussed     - Discussed with:  Patient            Postoperative Care    Pain management: Multi-modal analgesia.   PONV prophylaxis: Ondansetron (or other 5HT-3), Dexamethasone or Solumedrol     Comments:    Other Comments: GETTA with std monitors           Josseline Murray MD    Clinically Significant Risk Factors Present on Admission                 # Drug Induced Platelet Defect: home medication list includes an antiplatelet medication   # Hypertension: Noted on problem list  # Heart failure, NOS: heart failure noted on the problem list and last echo with EF 40-50%         # DMII: A1C = 6.6 % (Ref range: 0.0 - 5.6 %) within past 6 months         # History of CABG: noted on surgical history

## 2025-03-19 NOTE — PHARMACY-ADMISSION MEDICATION HISTORY
Pharmacist Admission Medication History    Admission medication history is complete. The information provided in this note is only as accurate as the sources available at the time of the update.    Information Source(s): Patient and CareEverywhere/SureScripts via in-person    Pertinent Information: ROBLES Lisinopril was marked as hold day of surgery but patient reported that he did take this early AM of surgery.     Changes made to PTA medication list:  Added: None  Deleted: None  Changed: None    Allergies reviewed with patient and updates made in EHR: yes    Medication History Completed By: Ethan Mayo RPH 3/19/2025 3:57 PM    PTA Med List   Medication Sig Last Dose/Taking    aspirin (ASA) 81 MG chewable tablet Take 1 tablet (81 mg) by mouth daily 3/16/2025    lisinopril (ZESTRIL) 20 MG tablet Take 1 tablet (20 mg) by mouth 2 times daily 3/19/2025 at  5:00 AM    metoprolol succinate ER (TOPROL XL) 50 MG 24 hr tablet Take 1 tablet (50 mg) by mouth every morning 3/19/2025 at  5:00 AM    rosuvastatin (CRESTOR) 40 MG tablet Take 1 tablet (40 mg) by mouth daily 3/19/2025 at  5:00 AM    empagliflozin (JARDIANCE) 10 MG TABS tablet TAKE 1 TABLET (10 MG) BY MOUTH DAILY. 3/16/2025

## 2025-03-19 NOTE — OR NURSING
PACU to Inpatient Nursing Handoff    Patient Rodrigo Lanier is a 69 year old male who speaks English.   Procedure Procedure(s):  Percutaneous nephrolithotomy   Surgeon(s) Primary: Maynor Lucas MD  Resident - Assisting: Shaji Moe MD     No Known Allergies    Isolation  [unfilled]     Past Medical History   has a past medical history of CAD (coronary artery disease), Essential hypertension, Hydronephrosis, Hyperlipidemia LDL goal <100, Nephrolithiasis, Renal cyst, acquired, right, S/P CABG (coronary artery bypass graft) (10/16/2024), and Type 2 diabetes, HbA1C goal < 8% (H).    Anesthesia General   Dermatome Level     Preop Meds acetaminophen (Tylenol) - time given: 0705   Nerve block Not applicable   Intraop Meds fentanyl (Sublimaze): 100 mcg total  ketorolac (Toradol): last given at 1004  ondansetron (Zofran): last given at 1001   Local Meds Yes   Antibiotics ampicillin (Omnipen) - last given at 0751  gentamicin (Garamycin) - last given at 0800     Pain Patient Currently in Pain: denies   PACU meds  Not applicable   PCA / epidural No   Capnography     Telemetry ECG Rhythm: Normal sinus rhythm   Inpatient Telemetry Monitor Ordered? No        Labs Glucose Lab Results   Component Value Date     03/19/2025     11/20/2022       Hgb Lab Results   Component Value Date    HGB 17.2 03/12/2025       INR Lab Results   Component Value Date    INR 1.28 11/16/2022      PACU Imaging Not applicable     Wound/Incision Incision/Surgical Site 11/16/22 Chest (Active)   Number of days: 854       Incision/Surgical Site 11/16/22 Left Knee (Active)   Number of days: 854       Incision/Surgical Site 11/16/22 Groin (Active)   Number of days: 854       Incision/Surgical Site 11/16/22 Left Ankle (Active)   Number of days: 854       Incision/Surgical Site 03/19/25 Right Flank (Active)   Incision Assessment UTV 03/19/25 1045   Closure GRANT 03/19/25 1045   Incision Drainage Amount Moderate 03/19/25 1045   Drainage Description  Other (Comment) 03/19/25 1045   Dressing Intervention Moist drainage 03/19/25 1045   Number of days: 0      CMS        Equipment Not applicable   Other LDA       IV Access Peripheral IV 03/19/25 Right Hand (Active)   Site Assessment WDL 03/19/25 0717   Line Status Saline locked 03/19/25 0717   Dressing Transparent 03/19/25 0717   Dressing Status clean;dry;intact 03/19/25 0717   Dressing Intervention New dressing  03/19/25 0717   Line Intervention Flushed 03/19/25 0717   Line Necessity Yes, meets criteria 03/19/25 0717   Phlebitis Scale 0-->no symptoms 03/19/25 0717   Infiltration? no 03/19/25 0717   Number of days: 0      Blood Products Not applicable EBL 50   mL   Intake/Output Date 03/19/25 0700 - 03/20/25 0659   Shift 2767-0405 3168-2418 0750-3921 24 Hour Total   INTAKE   I.V. 1000   1000   IV Piggyback 50   50   Shift Total(mL/kg) 1050(15.15)   1050(15.15)   OUTPUT   Blood 50   50   Shift Total(mL/kg) 50(0.72)   50(0.72)   Weight (kg) 69.3 69.3 69.3 69.3      Drains / Jasso Urinary Drain 03/19/25 Urethral Catheter Latex 10 fr (Active)   Tube Description Positional 03/19/25 1045   Perineal Skin Assessment Intact 03/19/25 1045   Collection Container Standard 03/19/25 1045   Securement Method Commercial securement device 03/19/25 1045   Rationale for Continued Use Gross hematuria;Surgical procedure 03/19/25 1045   Number of days: 0      Time of void PreOp Time of Void Prior to Procedure: 0600 (03/19/25 0706)    PostOp      Diapered? No   Bladder Scan     PO    milk     Vitals    B/P: (!) 157/92  T: 97.4  F (36.3  C)    Temp src: Tympanic  P:  Pulse: 56 (03/19/25 0627)          R: 16  O2:  SpO2: 99 %    O2 Device: Simple face mask (03/19/25 1045)    Oxygen Delivery: 6 LPM (03/19/25 1045)         Family/support present No Family or Friends here today   Patient belongings     Patient transported on cart   DC meds/scripts (obs/outpt) Not applicable   Inpatient Pain Meds Released? Yes       Special needs/considerations  None   Tasks needing completion CT ordered for this evening       Yvonne Simmons, RN  Laurie

## 2025-03-19 NOTE — ANESTHESIA CARE TRANSFER NOTE
Patient: Rodrigo Lanier    Procedure: Procedure(s):  Percutaneous nephrolithotomy       Diagnosis: Right nephrolithiasis [N20.0]  Diagnosis Additional Information: No value filed.    Anesthesia Type:   General     Note:    Oropharynx: oropharynx clear of all foreign objects  Level of Consciousness: awake  Oxygen Supplementation: face mask  Level of Supplemental Oxygen (L/min / FiO2): 8  Independent Airway: airway patency satisfactory and stable  Dentition: dentition unchanged  Vital Signs Stable: post-procedure vital signs reviewed and stable  Report to RN Given: handoff report given  Patient transferred to: PACU    Handoff Report: Identifed the Patient, Identified the Reponsible Provider, Reviewed the pertinent medical history, Discussed the surgical course, Reviewed Intra-OP anesthesia mangement and issues during anesthesia, Set expectations for post-procedure period and Allowed opportunity for questions and acknowledgement of understanding      Vitals:  Vitals Value Taken Time   /77 03/19/25 1045   Temp     Pulse 66 03/19/25 1049   Resp 13 03/19/25 1049   SpO2 99 % 03/19/25 1049   Vitals shown include unfiled device data.    Electronically Signed By: FREDDIE Wilkins CRNA  March 19, 2025  10:50 AM

## 2025-03-19 NOTE — ANESTHESIA PROCEDURE NOTES
Airway       Patient location during procedure: OR       Procedure Start/Stop Times: 3/19/2025 7:51 AM  Staff -        CRNA: Nick Worthy APRN CRNA       Performed By: CRNA  Consent for Airway        Urgency: elective  Indications and Patient Condition       Indications for airway management: lucas-procedural       Induction type:intravenous       Mask difficulty assessment: 2 - vent by mask + OA or adjuvant +/- NMBA    Final Airway Details       Final airway type: endotracheal airway       Successful airway: ETT - single  Endotracheal Airway Details        ETT size (mm): 7.5       Successful intubation technique: video laryngoscopy       VL Blade Size: Glidescope 3       Grade View of Cords: 1       Adjucts: stylet       Position: Right       Measured from: lips       Secured at (cm): 22       Bite block used: None    Post intubation assessment        Placement verified by: capnometry, equal breath sounds and chest rise        Number of attempts at approach: 1       Number of other approaches attempted: 0       Secured with: tape       Ease of procedure: easy       Dentition: Intact    Medication(s) Administered   Medication Administration Time: 3/19/2025 7:51 AM

## 2025-03-19 NOTE — PROGRESS NOTES
6MS ADMISSION 1435    D: Patient admitted/transferred from PACU via patient transport for R side nephrolithotomy.     I: Upon arrival to the unit patient was oriented to room, unit, and call light. Patient s height, weight, and vital signs were obtained. Allergies reviewed and allergy band applied. Provider notified of patient s arrival on the unit. Adult AVS completed. Head to toe assessment completed. Education assessment completed. Care plan initiated.    A: Vital signs stable upon admission. Patient rates pain at 0/10. Two RN skin assessment completed? No. Significant Skin Findings include R flank dressing. WOC Nurse Consult Ordered? No . Bed Algorithm can be found in PCS flow sheets (Support Surface Algorithm) and on IP [facility] NURSE RESOURCE TAB, was this used during this assessment? No. Was a bariatric bed frame ordered? No. Was an air pump added to the Isoflex mattress? No    P: Continue to monitor patient s pain and intervene as needed. Continue with plan of care. Notify provider with any concerns or changes in patient status.

## 2025-03-19 NOTE — INTERVAL H&P NOTE
I have reviewed the surgical (or preoperative) H&P that is linked to this encounter, and examined the patient. There are no significant changes. We discussed the benefits and risks of right percutaneous nephrolithotomy, including but not limited to, bleeding requiring blood transfusion, infection, need for nephrostomy tube or ureteral stent, ureteral stent related symptoms and cystoscopic removal in the office after surgery, injury to ureter, injury to the kidney rarely leading to long-term kidney damage, injury to the lungs/colon/intraabdomnal organs, need for second procedure if unable to reach stone or residual fragments.  Aware that he would be spending the night in the hospital after surgery and get a CT scan on postoperative day 0 or 1.      Clinical Conditions Present on Arrival:  Clinically Significant Risk Factors Present on Admission                  # Drug Induced Platelet Defect: home medication list includes an antiplatelet medication      # DMII: A1C = 6.6 % (Ref range: 0.0 - 5.6 %) within past 6 months

## 2025-03-20 ENCOUNTER — TELEPHONE (OUTPATIENT)
Dept: UROLOGY | Facility: CLINIC | Age: 70
End: 2025-03-20
Payer: COMMERCIAL

## 2025-03-20 VITALS
RESPIRATION RATE: 16 BRPM | BODY MASS INDEX: 23.15 KG/M2 | SYSTOLIC BLOOD PRESSURE: 149 MMHG | OXYGEN SATURATION: 96 % | DIASTOLIC BLOOD PRESSURE: 72 MMHG | HEIGHT: 68 IN | HEART RATE: 76 BPM | WEIGHT: 152.78 LBS | TEMPERATURE: 97.5 F

## 2025-03-20 LAB
ANION GAP SERPL CALCULATED.3IONS-SCNC: 11 MMOL/L (ref 7–15)
BACTERIA SPEC CULT: NORMAL
BUN SERPL-MCNC: 22.1 MG/DL (ref 8–23)
CALCIUM SERPL-MCNC: 8.7 MG/DL (ref 8.8–10.4)
CHLORIDE SERPL-SCNC: 106 MMOL/L (ref 98–107)
CREAT SERPL-MCNC: 1.04 MG/DL (ref 0.67–1.17)
EGFRCR SERPLBLD CKD-EPI 2021: 77 ML/MIN/1.73M2
ERYTHROCYTE [DISTWIDTH] IN BLOOD BY AUTOMATED COUNT: 11.6 % (ref 10–15)
GLUCOSE SERPL-MCNC: 166 MG/DL (ref 70–99)
HCO3 SERPL-SCNC: 21 MMOL/L (ref 22–29)
HCT VFR BLD AUTO: 37.9 % (ref 40–53)
HGB BLD-MCNC: 13.2 G/DL (ref 13.3–17.7)
MCH RBC QN AUTO: 30.1 PG (ref 26.5–33)
MCHC RBC AUTO-ENTMCNC: 34.8 G/DL (ref 31.5–36.5)
MCV RBC AUTO: 87 FL (ref 78–100)
PLATELET # BLD AUTO: 179 10E3/UL (ref 150–450)
POTASSIUM SERPL-SCNC: 3.9 MMOL/L (ref 3.4–5.3)
RBC # BLD AUTO: 4.38 10E6/UL (ref 4.4–5.9)
SODIUM SERPL-SCNC: 138 MMOL/L (ref 135–145)
WBC # BLD AUTO: 14.7 10E3/UL (ref 4–11)

## 2025-03-20 PROCEDURE — 80048 BASIC METABOLIC PNL TOTAL CA: CPT

## 2025-03-20 PROCEDURE — 36415 COLL VENOUS BLD VENIPUNCTURE: CPT

## 2025-03-20 PROCEDURE — 85027 COMPLETE CBC AUTOMATED: CPT

## 2025-03-20 PROCEDURE — 250N000013 HC RX MED GY IP 250 OP 250 PS 637

## 2025-03-20 PROCEDURE — 250N000011 HC RX IP 250 OP 636

## 2025-03-20 RX ORDER — SENNA AND DOCUSATE SODIUM 50; 8.6 MG/1; MG/1
1 TABLET, FILM COATED ORAL AT BEDTIME
Qty: 30 TABLET | Refills: 0 | Status: SHIPPED | OUTPATIENT
Start: 2025-03-20 | End: 2025-04-07

## 2025-03-20 RX ADMIN — LISINOPRIL 20 MG: 20 TABLET ORAL at 09:03

## 2025-03-20 RX ADMIN — EMPAGLIFLOZIN 10 MG: 10 TABLET, FILM COATED ORAL at 08:31

## 2025-03-20 RX ADMIN — AMPICILLIN SODIUM 2 G: 2 INJECTION, POWDER, FOR SOLUTION INTRAMUSCULAR; INTRAVENOUS at 04:00

## 2025-03-20 RX ADMIN — TAMSULOSIN HYDROCHLORIDE 0.4 MG: 0.4 CAPSULE ORAL at 08:31

## 2025-03-20 RX ADMIN — METOPROLOL SUCCINATE 50 MG: 50 TABLET, EXTENDED RELEASE ORAL at 08:32

## 2025-03-20 RX ADMIN — AMPICILLIN SODIUM 2 G: 2 INJECTION, POWDER, FOR SOLUTION INTRAMUSCULAR; INTRAVENOUS at 09:00

## 2025-03-20 RX ADMIN — ROSUVASTATIN 40 MG: 20 TABLET, FILM COATED ORAL at 08:31

## 2025-03-20 ASSESSMENT — ACTIVITIES OF DAILY LIVING (ADL)
ADLS_ACUITY_SCORE: 47

## 2025-03-20 NOTE — TELEPHONE ENCOUNTER
Left message informing patient of upcoming appt as below:    4/7/25  Time: 2:30 p.m.  Location: Urology [facility]   Provider:  Dr. Lucas  Reason:  2 weeks ureteral stent removal    Patient to call central urology line and confirm or reschedule date does not work for him.

## 2025-03-20 NOTE — DISCHARGE INSTRUCTIONS
Home-going instructions-----------------    You had had percutaneous nephrostolithotomy (or nephrolithotomy), the provider passed a small medical instrument through your skin into your kidney. This was done to break up or remove kidney stones.    Wound  Keep incision clean and dry. Do not cover tightly with bandages. Until the wound completely closes,  you may note some clear fluid draining from the site for a few days. This is normal. You may place  gauze pads loosely over the wound to protect clothing.      Activity Limitation:     - No heavy lifting for 2 weeks  - No driving or operating heavy machinery while on narcotic pain medication.     FOLLOW THESE INSTRUCTIONS AS INDICATED BELOW:  - Observe operative area for signs of excessive bleeding.  - You may shower.  - Increase fluid intake to promote clear urine.  - Resume usual diet as tolerated    What to expect while recovering-----------  - You may experience some intermittent bleeding that makes your urine pink or cherry colored. This is normal.  - However, if you are unable to urinate, passing large amount of clots, have cass blood in your urine, or have a temperature >101 degrees, call the urology nurse on call, or present to your nearest emergency department.  - You are encouraged to walk daily  - A URETERAL STENT has been placed that allows urine to flow unobstructed from your kidney into your bladder.  The stent has a curl in the kidney and a curl in the bladder.  The curl in the bladder can cause some urgency and frequency of urination as well as some mild blood in the urine.  The curl in the kidney can cause some mild flank discomfort.  This may be more noticeable when you urinate.  A URETERAL STENT is meant to be left in temporarily.  It must be removed or changed no later than 3 months after it's insertion.  If it's not removed it can result in stone overgrowth on the stent that can cause pain, infection, and can be very difficult to remove.   "    Follow-up for Stent Removal: The ureteral stent is generally removed within 2-3 weeks following surgery and will be determined by your surgeon. While your stent is in place, it is common to feel a slight amount of flank fullness and urgency to void as a result of the stent. These symptoms often improve over time as the body adjusts to the indwelling stent.The stent is removed by cystoscopy during which time your surgeon will place a small flexible telescope into the urethra to visualize and grasp the terminal end of the stent that rests in your bladder. This generally takes less than a couple of minutes to perform.    Phone numbers:   - Monday through Friday 8am to 4:30pm: Call [phone] with questions or to schedule or confirm appointment.    - Nights or weekends: call the after hours emergency pager - [phone] and tell the  \"I would like to page the Urology Resident on call.\"  - For emergencies, call [phone]      "

## 2025-03-20 NOTE — PROGRESS NOTES
"  VS: BP (!) 149/72 (BP Location: Left arm, Patient Position: Supine, Cuff Size: Adult Regular)   Pulse 76   Temp 97.5  F (36.4  C) (Oral)   Resp 16   Ht 1.727 m (5' 7.99\")   Wt 69.3 kg (152 lb 12.5 oz)   SpO2 96%   BMI 23.24 kg/m      O2: RA   Output: Check Flowsheet    Last BM: 11/19/2025   Activity: IND   Up for meals? Yes   Skin: Incision right flank of abd   Pain: NO pain   CMS: AOx4   Dressing: On clean and intact   Diet: Regular   LDA: None   Equipment: PCD   Plan: Discharged   Additional Info:       "

## 2025-03-20 NOTE — PROGRESS NOTES
6MS DISCHARGE 1215    D: Patient discharged to home at 1215. Patient accompanied by transport.    I: Discharge prescriptions given to patient. All discharge medications and instructions reviewed with patient. Patient instructed to seek care if experiencing worsening symptoms, such as increased pain. Other phone numbers to call with questions or concerns after discharge reviewed. PIV removed. Education completed.    A: Patient verbalized understanding of discharge medications and instructions. Prescribed home medications given to patient. Belongings returned to patient.    P: Patient to follow-up with referrals and PCP.

## 2025-03-20 NOTE — PLAN OF CARE
"Goal Outcome Evaluation:  Shift 8838-1028       VS: BP (!) 143/79 (BP Location: Left arm, Patient Position: Semi-Vega's, Cuff Size: Adult Regular)   Pulse 62   Temp 97.4  F (36.3  C) (Oral)   Resp 18   Ht 1.727 m (5' 7.99\")   Wt 69.3 kg (152 lb 12.5 oz)   SpO2 97%   BMI 23.24 kg/m       O2: On RA, denies chest pain or SOB, Capno on    Output: Jasso - draining bloody     Last BM: 3/18 per pt. BS active x4 , passing flatus    Activity: Independent , steady gait    Skin: R flank dressing    Pain: Denies    CMS: A&OX4   Dressing: R flank surgical dressing    Diet: Regular diet    LDA: R PIV- SL    Equipment: IV pole and personal belongings    Plan: Continue POC    Additional Info: Urology is the primary team                            Problem: Delirium  Goal: Optimal Coping  Outcome: Progressing  Goal: Improved Behavioral Control  Outcome: Progressing  Intervention: Minimize Safety Risk  Recent Flowsheet Documentation  Taken 3/20/2025 0128 by Booker Coburn RN  Trust Relationship/Rapport: care explained  Goal: Improved Attention and Thought Clarity  Outcome: Progressing  Goal: Improved Sleep  Outcome: Progressing     Problem: Adult Inpatient Plan of Care  Goal: Plan of Care Review  Description: The Plan of Care Review/Shift note should be completed every shift.  The Outcome Evaluation is a brief statement about your assessment that the patient is improving, declining, or no change.  This information will be displayed automatically on your shiftnote.  Outcome: Progressing  Goal: Patient-Specific Goal (Individualized)  Description: You can add care plan individualizations to a care plan. Examples of Individualization might be:  \"Parent requests to be called daily at 9am for status\", \"I have a hard time hearing out of my right ear\", or \"Do not touch me to wake me up as it startlesme\".  Outcome: Progressing  Goal: Absence of Hospital-Acquired Illness or Injury  Outcome: Progressing  Intervention: Prevent and Manage " VTE (Venous Thromboembolism) Risk  Recent Flowsheet Documentation  Taken 3/20/2025 0128 by Booker Coburn, RN  VTE Prevention/Management: SCDs on (sequential compression devices)  Goal: Optimal Comfort and Wellbeing  Outcome: Progressing  Intervention: Provide Person-Centered Care  Recent Flowsheet Documentation  Taken 3/20/2025 0128 by Booker Coburn RN  Trust Relationship/Rapport: care explained  Goal: Readiness for Transition of Care  Outcome: Progressing

## 2025-03-20 NOTE — PROGRESS NOTES
"Urology  Progress Note    NAEO  Pain well controlled  Tolerating solid food - no n/v  Passing gas  Ambulating  Jasso in place    Exam  BP (!) 143/79 (BP Location: Left arm, Patient Position: Semi-Vega's, Cuff Size: Adult Regular)   Pulse 62   Temp 97.9  F (36.6  C) (Oral)   Resp 16   Ht 1.727 m (5' 7.99\")   Wt 69.3 kg (152 lb 12.5 oz)   SpO2 97%   BMI 23.24 kg/m    No acute distress  Unlabored breathing  Abdomen soft,  appropriately tender, nondistended. Incisions cdi  Jasso with grade II urine in tubing      UOP 2300/--      Labs  Recent Labs   Lab 03/20/25  0543 03/19/25  1048 03/19/25  0635   WBC 14.7*  --   --    HGB 13.2*  --   --    MCV 87  --   --      --   --      --   --    POTASSIUM 3.9  --   --    CHLORIDE 106  --   --    CO2 21*  --   --    BUN 22.1  --   --    CR 1.04  --   --    ANIONGAP 11  --   --    LINDA 8.7*  --   --    * 166* 143*       AM labs pending    Assessment/Plan  69 year old male POD#1 s/p R PCNL for nephrolithiasis.     Neuro: Tylenol for pain control  CV: LUCHO  Pulm: IS while awake  FEN/GI: regular diet, Bowel regimen.  Endo: LUCHO  : TOV this am  Heme/ID: Hgb stable  Activity: Up ad servando  PPx: SCDs. 5 days augmentin on dc  Dispo: likely discharge later today    Seen and examined with the chief resident. Will discuss with Dr. Lucas.    Shaji Moe MD  Urology Resident     Contacting the Urology Team     Please use the following job codes to reach the Urology Team. Note that you must use an in house phone and that job codes cannot receive text pages.   On weekdays, dial [phone] (or star-star-star [phone] on the new [employer] telephones) then [phone] to reach the Adult Urology resident or PA on call  On weekdays, dial [phone] (or star-star-star 777 on the new [employer] telephones) then [phone] to reach the Pediatric Urology resident  On weeknights and weekends, dial [phone] (or star-star-star 777 on the new [employer] telephones) then [phone] to reach the Urology resident on call (for both Adult " and Pediatrics)

## 2025-03-21 NOTE — OP NOTE
OPERATIVE REPORT    PREOPERATIVE DIAGNOSIS:  Right nephrolithiasis with obstruction    POSTOPERATIVE DIAGNOSIS: Same    PROCEDURES PERFORMED:   Cystoscopy  Right retrograde pyelogram  Right percutaneous access, new  Right percutaneous nephrolithotomy, complex due to stones in multiple location (modifier 22 for hard stones requiring 45 minutes longer than normal)  Right antegrade ureteral stent placement    STAFF SURGEON: Maynor Lucas MD  ASSISTANT(S): Shaji Moe, PGY 3  ANESTHESIA: General  ESTIMATED BLOOD LOSS: 50 ml  COMPLICATIONS: None.   SPECIMEN: Right renal stone for analysis; right renal stone vera culture    SIGNIFICANT FINDINGS:   Right renal stone completely blocking the UPJ  Stone in the lower pole and upper pole also removed      BRIEF OPERATIVE INDICATIONS: Rodrigo Lanier is a(n) 69 year old male who presented with a large UPJ stone with progressive hydronephrosis and multiple other stones within the kidney.  After a discussion of all risks, benefits, and alternatives, the patient elected to proceed with right percutaneous nephrolithotomy.    DESCRIPTION OF PROCEDURE:  After informed consent was obtained, the patient was transported to the operating room & placed supine on the table. After adequate anesthesia was induced, the patient was placed in modified martinez position and prepped and draped in the usual sterile fashion. A timeout was taken to confirm correct patient, procedure and laterality. Pre-operative IV antibiotics were administered.     A flexible cystoscope inserted through the urethra into the bladder.  An open-ended catheter over a sensor wire was placed into the right ureter up to the level of the stone.  Rest of the bladder was unremarkable.  He is a dual-lumen catheter over the wire she retrograde pyelogram demonstrated severe hydronephrosis and multiple radiopacities consistent with known stones.    Using ultrasound guidance I obtained lower pole access.  I placed a zip wire and  however was unable to navigated across the stone had and it sounds in the upper pole.  Exchanges for Super Stiff wire.  Using a dual-lumen catheter I placed a sensor wire as well.  The sensor wire was clipped to the drape as a safety and a 24 fr nephromax balloon was inflated to 16 pavel and the sheath was inserted into the kidney.    Pyeloscopy was performed.  Multiple lower pole stones were treated with the lithotripter.  The UPJ stone was treated.  It was very hard and required 45 min longer than normal to treat.  There was an upper pole stone that I could not reach with the rigid scope and instead used the flexible scope to reach it, pull it down to the renal pelvis and then treated it.    After clearance, performed antegrade ureteroscopy  performed with the flexible cystoscope in the proximal ureter and no residual stones were found down to the level of the ureteral catheter.  The UPJ was edematous from the stone.    A 6 fr x 26 cm stent was placed in an antegrade fashion with good proximal and distal curl on fluoroscopy.  No string was left attached.    The sheath was withdrawn and pressure held for 5 minutes.  There was no bleeding from the incision after 5 minutes. 30 ml of  0.25% bupivicaine was instilled in the wound.  The incision was closed with 4-0 monocryl.      They were awakened from anesthesia and transferred to the PACU.       POSTOP PLAN:  CT today or tomorrow  AM labs  Stent removal in two weeks    I, Maynor Lucas, was present for the entire case on 03/19/25.

## 2025-03-22 LAB
APPEARANCE STONE: NORMAL
COMPN STONE: NORMAL
SPECIMEN WT: 2239 MG

## 2025-03-24 LAB — BACTERIA SPEC CULT: NO GROWTH

## 2025-03-29 DIAGNOSIS — E11.59 TYPE 2 DIABETES MELLITUS WITH OTHER CIRCULATORY COMPLICATION, WITHOUT LONG-TERM CURRENT USE OF INSULIN (H): ICD-10-CM

## 2025-03-31 RX ORDER — EMPAGLIFLOZIN 10 MG/1
10 TABLET, FILM COATED ORAL DAILY
Qty: 90 TABLET | Refills: 1 | Status: SHIPPED | OUTPATIENT
Start: 2025-03-31

## 2025-04-07 ENCOUNTER — OFFICE VISIT (OUTPATIENT)
Dept: UROLOGY | Facility: CLINIC | Age: 70
End: 2025-04-07
Payer: COMMERCIAL

## 2025-04-07 DIAGNOSIS — N20.0 NEPHROLITHIASIS: Primary | ICD-10-CM

## 2025-04-07 RX ORDER — CEPHALEXIN 500 MG/1
500 CAPSULE ORAL ONCE
Status: COMPLETED | OUTPATIENT
Start: 2025-04-07 | End: 2025-04-07

## 2025-04-07 RX ORDER — LIDOCAINE HYDROCHLORIDE 20 MG/ML
JELLY TOPICAL ONCE
Status: COMPLETED | OUTPATIENT
Start: 2025-04-07 | End: 2025-04-07

## 2025-04-07 RX ADMIN — LIDOCAINE HYDROCHLORIDE 10 ML: 20 JELLY TOPICAL at 14:54

## 2025-04-07 RX ADMIN — CEPHALEXIN 500 MG: 500 CAPSULE ORAL at 14:54

## 2025-04-07 ASSESSMENT — PAIN SCALES - GENERAL: PAINLEVEL_OUTOF10: NO PAIN (0)

## 2025-04-07 NOTE — PROGRESS NOTES
Patient educated regarding stent removal procedure and possible symptoms after removal.  Patient voiced understanding of information.  Handout given to patient.  Consent form signed.    KSI Timeout    Correct patient?: Yes   Correct site?:  Yes  Correct procedure?:  Yes   Correct laterality?:  Right  Consents verified?:  Yes  Relevant lab results available?:  Yes    Clinic Administered Medication Documentation    Patient was given cephalexin 500mg one capsule and lidocaine jelly 2% per urethra. Prior to medication administration, verified patient's identity using patient's name and date of birth.    Isis Workman CMA    {TIP  Navigate to MAR activity to document Clinic Administered Medication.:[id number]}

## 2025-04-11 PROCEDURE — 83935 ASSAY OF URINE OSMOLALITY: CPT | Mod: 90 | Performed by: UROLOGY

## 2025-04-11 PROCEDURE — 84300 ASSAY OF URINE SODIUM: CPT | Mod: 90 | Performed by: UROLOGY

## 2025-04-11 PROCEDURE — 84540 ASSAY OF URINE/UREA-N: CPT | Mod: 90 | Performed by: UROLOGY

## 2025-04-11 PROCEDURE — 82507 ASSAY OF CITRATE: CPT | Mod: 90 | Performed by: UROLOGY

## 2025-04-11 PROCEDURE — 82436 ASSAY OF URINE CHLORIDE: CPT | Mod: 90 | Performed by: UROLOGY

## 2025-04-11 PROCEDURE — 82140 ASSAY OF AMMONIA: CPT | Mod: 90 | Performed by: UROLOGY

## 2025-04-11 PROCEDURE — 82340 ASSAY OF CALCIUM IN URINE: CPT | Mod: 90 | Performed by: UROLOGY

## 2025-04-11 PROCEDURE — 84105 ASSAY OF URINE PHOSPHORUS: CPT | Mod: 90 | Performed by: UROLOGY

## 2025-04-11 PROCEDURE — 99000 SPECIMEN HANDLING OFFICE-LAB: CPT | Performed by: UROLOGY

## 2025-04-11 PROCEDURE — 84392 ASSAY OF URINE SULFATE: CPT | Mod: 90 | Performed by: UROLOGY

## 2025-04-11 PROCEDURE — 84560 ASSAY OF URINE/URIC ACID: CPT | Mod: 90 | Performed by: UROLOGY

## 2025-04-11 PROCEDURE — 82570 ASSAY OF URINE CREATININE: CPT | Mod: 90 | Performed by: UROLOGY

## 2025-04-11 PROCEDURE — 83986 ASSAY PH BODY FLUID NOS: CPT | Mod: 90 | Performed by: UROLOGY

## 2025-04-11 PROCEDURE — 83945 ASSAY OF OXALATE: CPT | Mod: 90 | Performed by: UROLOGY

## 2025-04-11 PROCEDURE — 83735 ASSAY OF MAGNESIUM: CPT | Mod: 90 | Performed by: UROLOGY

## 2025-04-11 PROCEDURE — 84133 ASSAY OF URINE POTASSIUM: CPT | Mod: 90 | Performed by: UROLOGY

## 2025-04-16 LAB
AMMONIA 24H UR-SRATE: 24 MMOL/24 H (ref 15–56)
BRUSHITE CRY 24H SATFR UR: -0.43 DG
BSA: ABNORMAL 1.73M(2)
CALCIUM 24H UR-MRATE: 292 MG/24 H
CAOX 24H ENGDIFF UR: 1.99 DG
CHLORIDE 24H UR-SRATE: 141 MMOL/24 H (ref 34–286)
CITRATE 24H UR-MRATE: 947 MG/24 H
COLLECT DURATION TIME UR: 24 H
CREAT 24H UR-MRATE: 1497 MG/24 H (ref 930–2955)
HYDROXYAPATITE 24H ENGDIFF UR: 2.38 DG
MAGNESIUM 24H UR-MRATE: 73 MG/24 H (ref 51–269)
OSMOLALITY 24H UR: 903 MOSM/KG (ref 150–1150)
OXALATE 24H UR-MRATE: 25.5 MG/24 H (ref 9.7–40.5)
OXALATE 24H UR-SRATE: 0.29 MMOL/24 H (ref 0.11–0.46)
PH 24H UR: 5.5 [PH] (ref 4.5–8)
PHOSPHATE 24H UR-MRATE: 1387 MG/24 H (ref 226–1797)
POTASSIUM 24H UR-SRATE: 53 MMOL/24 H (ref 16–105)
PROTEIN CATABOLIC RATE 24H UR-MRATE: 98 G/24 H (ref 56–125)
SODIUM 24H UR-SRATE: 179 MMOL/24 H (ref 22–328)
SPECIMEN VOL 24H UR: 1825 ML
SULFATE 24H UR-SRATE: 21 MMOL/24 H (ref 7–47)
URATE 24H UR-MRATE: 730 MG/24 H (ref 200–1000)
URATE CRY 24H SATFR UR: 3.32 DG
URINALYSIS SPECIALIST REVIEW: ABNORMAL
UUN 24H UR-MRATE: 11.6 G/24 H (ref 7–42)

## 2025-04-20 NOTE — PROGRESS NOTES
PRE-PROCEDURE DIAGNOSIS: right indwelling ureteral stent s/p right stone surgery: percutaneous nephrolithotomy (PCNL)  POST-PROCEDURE DIAGNOSIS: same  PROCEDURE: Cystoscopy with right ureteral stent removal  HISTORY: Mr. Lanier is a 69 year old male who is s/p right stone surgery: percutaneous nephrolithotomy (PCNL).  Indweling ureteral stent was placed and they are here for office removal of the stent.  DESCRIPTION OF PROCEDURE: After informed consent was obtained, the patient was brought to the procedure room where he was placed in the supine position with all pressure points well padded.  The genitals were prepped and draped in a sterile fashion. A flexible cystoscope was introduced through a well-lubricated urethra. Anterior urethra strictures were absent.     The indwelling right stent was visualized, grasped and removed entirely confirmed by presence of two curls.   The bladder was not extensively visualized as this was done during the primary procedure.    The flexible cystoscope was removed and the findings were described to the patient.     ASSESSMENT AND PLAN: 69 year old male status post right stone surgery: percutaneous nephrolithotomy (PCNL) whose stent was successfully removed today.  Plan for renal US and supersaturation with follow-up in 8 weeks

## 2025-06-09 ENCOUNTER — HOSPITAL ENCOUNTER (OUTPATIENT)
Dept: ULTRASOUND IMAGING | Facility: HOSPITAL | Age: 70
Discharge: HOME | End: 2025-06-09
Attending: UROLOGY | Admitting: UROLOGY
Payer: COMMERCIAL

## 2025-06-09 DIAGNOSIS — N20.0 NEPHROLITHIASIS: ICD-10-CM

## 2025-06-09 PROCEDURE — 76775 US EXAM ABDO BACK WALL LIM: CPT

## 2025-06-16 ENCOUNTER — VIRTUAL VISIT (OUTPATIENT)
Dept: UROLOGY | Facility: CLINIC | Age: 70
End: 2025-06-16
Payer: COMMERCIAL

## 2025-06-16 DIAGNOSIS — Z71.3 DIETARY COUNSELING AND SURVEILLANCE: ICD-10-CM

## 2025-06-16 DIAGNOSIS — N20.0 NEPHROLITHIASIS: Primary | ICD-10-CM

## 2025-06-16 PROCEDURE — 1125F AMNT PAIN NOTED PAIN PRSNT: CPT | Mod: 95 | Performed by: UROLOGY

## 2025-06-16 PROCEDURE — 98006 SYNCH AUDIO-VIDEO EST MOD 30: CPT | Performed by: UROLOGY

## 2025-06-16 NOTE — PROGRESS NOTES
Virtual Visit Details    Type of service:  Video Visit   Video Start Time: 2:16 PM  Video End Time:2:25 PM    Originating Location (pt. 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    Mixed calcium neprholithiasis  S/p left URS  Low urine volume  Hypercalciuria  High sodium intake  Dietary surveillance/counseling    Discussed possible dietary and medical options to assist with stone prevention.  Based on stone analysis and 24 hr urine test, I counseled regarding:    Adequate fluid intake with goal of 64 to 80 fluid ounces a day for a goal urine output of 2.5 liters (80 oz) per day.  Discussed strategies to increase fluid intake, such as gradual increases of 4-8 fl oz over the period of weeks to get this goal after creating a log to see what baseline fluid intake is. and Discussed the role of elevated urine sodium causes high urine calcium and risk for stone disease.  This is directly related to intake of dietary sodium.  Goal for sodium intake is <2.5g or 2500 mg daily to help treat the hypercalciuria.     Plan:  -Dietary Recs: increase fluid intake and decrease sodium  -Repeat one collection 24 hr urine study  -follow-up visit after 24 hr urine    Maynor Lucas MD  June 16, 2025         Chief Complaint: Stone follow-up    History of Present Illness:  Rodrigo Lanier is a 69 year old male seen after Right PCNL on 3/19/2025.    Postoperatively:  Stent was removed cystoscopically on 4/7/2025 without issue. Since surgery, they did not have an unplanned clinic visit/ED visits/admissions.    Currently, they are experiencing no flank pain, no hematuria, or no dysuria.    Switching from Jardiance to something else due to insurance issue.       Imaging (renal US on 6/9/2025 CT on 3/19/2025, personally reviewed),  demonstrated: 2 mm upper pole stone (could be cyst calcification on right kidney) on CT.  No right stone or hydro on Ultrasound.    Metabolic:  Stone analysis:   Stone Composition   Date Value Ref Range Status   03/19/2025 See Note  Final     Comment:     Brown calculi composed primarily of:  70% calcium oxalate monohydrate,  10% calcium phosphate (hydroxy- and carbonate- apatite),   and   20% uric acid.    Tan calculi composed primarily of:  50% calcium oxalate monohydrate, and  50% calcium oxalate dihydrate.  INTERPRETIVE INFORMATION: Calculi (Stone) analysis    Calculi are the products of physiological processes that   yield crystalline compounds in a matrix of biological   compounds and blood.  Matrix components are not reported.    The clinically significant crystalline components   identified in calculi specimens are reported.  Gross   description may not be consistent with composition   determined by FTIR analysis.  Performed By: [facility]  [address] [place] [address]  : Wilner Shen MD, PhD  CLIA Number: [id number]      Stone culture: negative  Blood work:  Potassium   Date Value Ref Range Status   03/20/2025 3.9 3.4 - 5.3 mmol/L Final   11/20/2022 3.5 3.4 - 5.3 mmol/L Final     Calcium   Date Value Ref Range Status   03/20/2025 8.7 (L) 8.8 - 10.4 mg/dL Final     Phosphorus   Date Value Ref Range Status   11/20/2022 2.9 2.5 - 4.5 mg/dL Final     Magnesium   Date Value Ref Range Status   11/18/2022 2.0 1.6 - 2.3 mg/dL Final     Creatinine   Date Value Ref Range Status   03/20/2025 1.04 0.67 - 1.17 mg/dL Final         24 hr urine study  Component      Latest Ref Rng 4/11/2025  9:30 AM   Supersat 24 Calcium Oxalate Crystal      Reference Mean= 1.89 DG 1.99    Supersat 24 Brushite Crystal      Reference Mean= 0.46 DG -0.43    Supersat 24 Hydroxyapatite Crystal      Reference Mean= 4.19 DG 2.38    Supersat 24 Uric Acid Crystal      Reference Mean= 1.18 DG 3.32     Supersat 24 Sodium Urine      22 - 328 mmol/24 h 179    Supersat 24 Potassium Urine      16 - 105 mmol/24 h 53    Supersat 24 Calcium Urine      <250 mg/24 h 292 (H)    Supersat 24 Magnesium Urine      51 - 269 mg/24 h 73    Supersat 24 Chloride Urine      34 - 286 mmol/24 h 141    Supersat 24 Phosphorous Urine      226 - 1797 mg/24 h 1387    Supersat 24 Sulfate Urine      7 - 47 mmol/24 h 21    Supersat 24 pH Ur      4.5 - 8.0  5.5    Supersat 24 Uric Acid Ur      200 - 1000 mg/24 h 730    Supersat 24 Creatinine Ur      930 - 2955 mg/24 h 1497    Supersat 24 Osmolality Ur      150 - 1150 mOsm/kg 903    Supersat 24 Ammonium 24 Hr Ur      15 - 56 mmol/24 h 24    Supersat 24 Citrate Excretion Urine      mg/24 h 947    Supersat 24 Oxalate Urine (mmol/24 hr)      0.11 - 0.46 mmol/24 h 0.29    Supersat 24 Oxalate Urine (mg/24 hr)      9.7 - 40.5 mg/24 h 25.5    Supersat 24 Urea Nitrogen Urine      7.0 - 42.0 g/24 h 11.6    Supersat 24 Protein Catabolic Rate Urine      56 - 125 g/24 h 98    Supersat 24 Patient Surface Area      1.73m(2) SEE NOTE    Supersat 24 Collection Duration      h 24    Supersat 24 Volume      mL 1825       Legend:  (H) High         Allergies:  No Known Allergies         Medications:  Current Outpatient Medications   Medication Sig Dispense Refill    aspirin (ASA) 81 MG chewable tablet Take 1 tablet (81 mg) by mouth daily 90 tablet 3    empagliflozin (JARDIANCE) 10 MG TABS tablet TAKE 1 TABLET (10 MG) BY MOUTH DAILY. 90 tablet 1    lisinopril (ZESTRIL) 20 MG tablet Take 1 tablet (20 mg) by mouth 2 times daily 200 tablet 6    metoprolol succinate ER (TOPROL XL) 50 MG 24 hr tablet Take 1 tablet (50 mg) by mouth every morning 100 tablet 6    rosuvastatin (CRESTOR) 40 MG tablet Take 1 tablet (40 mg) by mouth daily 100 tablet 6     No current facility-administered medications for this visit.       Physical Exam:  B/P: Data Unavailable, T: Data Unavailable, P: Data Unavailable, R: Data  "Unavailable  Estimated body mass index is 23.24 kg/m  as calculated from the following:    Height as of 3/19/25: 1.727 m (5' 7.99\").    Weight as of 3/19/25: 69.3 kg (152 lb 12.5 oz).  General: age-appropriate appearing male in NAD.  "

## 2025-06-16 NOTE — NURSING NOTE
Current patient location: [place]    Is the patient currently in the state of [place]? YES    Visit mode: VIDEO    If the visit is dropped, the patient can be reconnected by:VIDEO VISIT: Text to cell phone:   Telephone Information:   Mobile [phone]       Will anyone else be joining the visit? NO  (If patient encounters technical issues they should call [phone])    Are changes needed to the allergy or medication list? E-check in was reviewed/completed for today's visit. VF did not review e-check in information again with Rodrigo due to this.        Are refills needed on medications prescribed by this physician? Discuss with provider    Rooming Documentation:  Not applicable    Reason for visit: RECHECK    Natalie KEBEDE

## 2025-06-28 ENCOUNTER — HEALTH MAINTENANCE LETTER (OUTPATIENT)
Age: 70
End: 2025-06-28

## 2025-07-21 ENCOUNTER — LAB (OUTPATIENT)
Dept: LAB | Facility: CLINIC | Age: 70
End: 2025-07-21
Payer: COMMERCIAL

## 2025-07-21 DIAGNOSIS — E78.00 PURE HYPERCHOLESTEROLEMIA: ICD-10-CM

## 2025-07-21 DIAGNOSIS — I25.10 CORONARY ARTERY DISEASE INVOLVING NATIVE CORONARY ARTERY OF NATIVE HEART WITHOUT ANGINA PECTORIS: ICD-10-CM

## 2025-07-21 DIAGNOSIS — E11.59 TYPE 2 DIABETES MELLITUS WITH OTHER CIRCULATORY COMPLICATION, WITHOUT LONG-TERM CURRENT USE OF INSULIN (H): ICD-10-CM

## 2025-07-21 DIAGNOSIS — I25.5 ISCHEMIC CARDIOMYOPATHY: ICD-10-CM

## 2025-07-21 LAB
ALBUMIN SERPL BCG-MCNC: 4.6 G/DL (ref 3.5–5.2)
ALP SERPL-CCNC: 77 U/L (ref 40–150)
ALT SERPL W P-5'-P-CCNC: 25 U/L (ref 0–70)
ANION GAP SERPL CALCULATED.3IONS-SCNC: 12 MMOL/L (ref 7–15)
AST SERPL W P-5'-P-CCNC: 27 U/L (ref 0–45)
BILIRUB SERPL-MCNC: 0.7 MG/DL
BUN SERPL-MCNC: 20.3 MG/DL (ref 8–23)
CALCIUM SERPL-MCNC: 10 MG/DL (ref 8.8–10.4)
CHLORIDE SERPL-SCNC: 107 MMOL/L (ref 98–107)
CHOLEST SERPL-MCNC: 158 MG/DL
CREAT SERPL-MCNC: 1.05 MG/DL (ref 0.67–1.17)
EGFRCR SERPLBLD CKD-EPI 2021: 76 ML/MIN/1.73M2
FASTING STATUS PATIENT QL REPORTED: YES
FASTING STATUS PATIENT QL REPORTED: YES
GLUCOSE SERPL-MCNC: 125 MG/DL (ref 70–99)
HCO3 SERPL-SCNC: 24 MMOL/L (ref 22–29)
HDLC SERPL-MCNC: 50 MG/DL
LDLC SERPL CALC-MCNC: 85 MG/DL
NONHDLC SERPL-MCNC: 108 MG/DL
POTASSIUM SERPL-SCNC: 4.3 MMOL/L (ref 3.4–5.3)
PROT SERPL-MCNC: 7.6 G/DL (ref 6.4–8.3)
SODIUM SERPL-SCNC: 143 MMOL/L (ref 135–145)
TRIGL SERPL-MCNC: 113 MG/DL

## 2025-07-21 PROCEDURE — 80053 COMPREHEN METABOLIC PANEL: CPT

## 2025-07-21 PROCEDURE — 80061 LIPID PANEL: CPT

## 2025-07-21 PROCEDURE — 36415 COLL VENOUS BLD VENIPUNCTURE: CPT

## 2025-08-04 ENCOUNTER — MYC MEDICAL ADVICE (OUTPATIENT)
Dept: INTERNAL MEDICINE | Facility: CLINIC | Age: 70
End: 2025-08-04
Payer: COMMERCIAL

## 2025-08-13 ENCOUNTER — VIRTUAL VISIT (OUTPATIENT)
Dept: INTERNAL MEDICINE | Facility: CLINIC | Age: 70
End: 2025-08-13
Payer: COMMERCIAL

## 2025-08-13 DIAGNOSIS — I25.10 CORONARY ARTERY DISEASE INVOLVING NATIVE HEART WITHOUT ANGINA PECTORIS, UNSPECIFIED VESSEL OR LESION TYPE: ICD-10-CM

## 2025-08-13 DIAGNOSIS — E11.59 TYPE 2 DIABETES MELLITUS WITH OTHER CIRCULATORY COMPLICATION, WITHOUT LONG-TERM CURRENT USE OF INSULIN (H): Primary | ICD-10-CM

## 2025-08-13 PROCEDURE — 98006 SYNCH AUDIO-VIDEO EST MOD 30: CPT | Performed by: INTERNAL MEDICINE

## 2025-08-27 ENCOUNTER — HOSPITAL ENCOUNTER (OUTPATIENT)
Dept: CARDIOLOGY | Facility: CLINIC | Age: 70
Discharge: HOME | End: 2025-08-27
Attending: INTERNAL MEDICINE
Payer: COMMERCIAL

## 2025-08-27 VITALS — HEART RATE: 65 BPM | SYSTOLIC BLOOD PRESSURE: 169 MMHG | DIASTOLIC BLOOD PRESSURE: 90 MMHG

## 2025-08-27 DIAGNOSIS — I25.10 CORONARY ARTERY DISEASE INVOLVING NATIVE CORONARY ARTERY OF NATIVE HEART WITHOUT ANGINA PECTORIS: ICD-10-CM

## 2025-08-27 DIAGNOSIS — I25.5 ISCHEMIC CARDIOMYOPATHY: ICD-10-CM

## 2025-08-27 DIAGNOSIS — Z95.1 S/P CABG (CORONARY ARTERY BYPASS GRAFT): ICD-10-CM

## 2025-08-27 PROCEDURE — 255N000002 HC RX 255 OP 636: Performed by: INTERNAL MEDICINE

## 2025-08-27 PROCEDURE — A9585 GADOBUTROL INJECTION: HCPCS | Performed by: INTERNAL MEDICINE

## 2025-08-27 PROCEDURE — 75561 CARDIAC MRI FOR MORPH W/DYE: CPT

## 2025-08-27 RX ORDER — GADOBUTROL 604.72 MG/ML
10 INJECTION INTRAVENOUS ONCE
Status: COMPLETED | OUTPATIENT
Start: 2025-08-27 | End: 2025-08-27

## 2025-08-27 RX ADMIN — GADOBUTROL 10 ML: 604.72 INJECTION INTRAVENOUS at 11:00

## (undated) DEVICE — DRAIN CHEST TUBE RIGHT ANGLED 28FR 8128

## (undated) DEVICE — SU ETHIBOND 2-0 SHDA 30" X563H

## (undated) DEVICE — SUCTION MANIFOLD NEPTUNE 2 SYS 4 PORT 0702-020-000

## (undated) DEVICE — STRAP KNEE/BODY 31143004

## (undated) DEVICE — NDL PERC ACCESS NAVIGUIDE W/SLDER 18GX20CM M0067001330

## (undated) DEVICE — TUBING SET THERMEDX UROLOGY SGL USE LL0006

## (undated) DEVICE — PACK TUBING MINI VAC CUSTOM 1/2X3/8T BB9J78R4

## (undated) DEVICE — SU SILK 1 TIE 10X30" SA87G

## (undated) DEVICE — SUCTION CANISTER MEDIVAC LINER 3000ML W/LID 65651-530

## (undated) DEVICE — CANNULA PERFUSION ARTERIAL 20FR 12" 77420

## (undated) DEVICE — PROTECTOR ARM ONE-STEP TRENDELENBURG 40418

## (undated) DEVICE — SET IV EXTENSION SMALL BORE 13INL 0.4ML

## (undated) DEVICE — SU ETHIBOND 0 CT-1 CR 8X18" CX21D

## (undated) DEVICE — GOWN IMPERVIOUS SPECIALTY XL/XLONG 39049

## (undated) DEVICE — SLEEVE TR BAND RADIAL COMPRESSION DEVICE 24CM TRB24-REG

## (undated) DEVICE — SPECIMEN TRAP STRYKER NEPTUNE IN-LINE 0700-050-000

## (undated) DEVICE — CATH ANGIO JB1 SOFT-VU 5FRX65CM MARINER 11734101

## (undated) DEVICE — SU VICRYL 0 CTX 36" J370H

## (undated) DEVICE — LINEN TOWEL PACK X30 5481

## (undated) DEVICE — BLADE KNIFE SURG 11 371111

## (undated) DEVICE — PROBE TRILOGY KIT 3.4MM X 340MM M0068402910

## (undated) DEVICE — LEAD PACER MYOCARDIAL BIPOLAR TEMPORARY 53CM 6495F

## (undated) DEVICE — SU PROLENE 4-0 SHDA 36" 8521H

## (undated) DEVICE — PACK OPEN HEART PV12OH524

## (undated) DEVICE — DEVICE TISSUE STABILIZATION OCTOBASE 28707

## (undated) DEVICE — LINEN TOWEL PACK X6 WHITE 5487

## (undated) DEVICE — TOTE ANGIO CORP PC15AT SAN32CC83O

## (undated) DEVICE — DILATOR RENAL AMPLATZ TYPE 8FRX35CM M0062601010

## (undated) DEVICE — SU PROLENE 7-0 BV-1DA 4X30" M8703

## (undated) DEVICE — SU VICRYL 3-0 FS-1 27" J442H

## (undated) DEVICE — CATH ANGIO MARINER JB1 4FRX65CM 11714035

## (undated) DEVICE — ADAPTER SCOPE UROLOK II LF M0067301400

## (undated) DEVICE — GLOVE BIOGEL PI MICRO SZ 7.0 48570

## (undated) DEVICE — KIT HAND CONTROL ANGIOTOUCH ACIST 65CM AT-P65

## (undated) DEVICE — DRSG KERLIX 4 1/2"X4YDS ROLL 6715

## (undated) DEVICE — PUNCH AORTIC 4.0MMX8" RCB40

## (undated) DEVICE — WIPES FOLEY CARE SURESTEP PROVON DFC100

## (undated) DEVICE — SYR 10ML FINGER CONTROL W/O NDL 309695

## (undated) DEVICE — BONE WAX 2.5GM W31G

## (undated) DEVICE — CATH BALLOON IABP 7.5FRX40ML INTRA-AORTIC 0684-00-0568-01

## (undated) DEVICE — CANNULA PERFUSION AORTIC ROOT 22FR 20012

## (undated) DEVICE — INTRODUCER CATH VASC 5FRX10CM  MPIS-501-NT-U-SST

## (undated) DEVICE — BASKET STONE RETRIEVAL NTNL ZERO TIP 1.9FRX90CM M0063901050

## (undated) DEVICE — CLIP HORIZON MULTI SM YELLOW 001204

## (undated) DEVICE — CATH ANGIO JUDKINS JL3.5 6FRX100CM INFINITI 534618T

## (undated) DEVICE — SU PLEDGET SOFT TFE 3/8"X3/26"X1/16" PCP40

## (undated) DEVICE — SU ETHIBOND 3-0 BBDA 36" X588H

## (undated) DEVICE — LINEN TOWEL PACK X5 5464

## (undated) DEVICE — DRSG KERLIX FLUFFS X5

## (undated) DEVICE — GUIDEWIRE AMPLATZ SUPER STIFF .035 X 145CM M0066401080

## (undated) DEVICE — LINEN LEG ROLL 5489

## (undated) DEVICE — SU STEEL 6 CCS 4X18" M654G

## (undated) DEVICE — RX SURGIFLO HEMOSTATIC MATRIX W/THROMBIN 8ML 2994

## (undated) DEVICE — CONNECTOR PERFUSION STR 1/2X1/2" W/O LL 6025

## (undated) DEVICE — SOL NACL 0.9% IRRIG 3000ML BAG 2B7127

## (undated) DEVICE — NDL 25GA 1.5" 305838

## (undated) DEVICE — KIT ENDO VASOVIEW HEMOPRO 2 VH-4000

## (undated) DEVICE — BLADE KNIFE BEAVER MINI STR BEAVER6900

## (undated) DEVICE — CONNECTOR PERFUSION Y 3/8X3/8" W/O LL 6031

## (undated) DEVICE — COVER TABLE POLY 65X90" 8186

## (undated) DEVICE — GUIDEWIRE SENSOR DUAL FLEX STR 0.035"X150CM M0066703080

## (undated) DEVICE — SPECIMEN CONTAINER 5OZ STERILE 2600SA

## (undated) DEVICE — GUIDEWIRE ZIPWIRE NITINOL STR 0.035"X150CM M0066802050

## (undated) DEVICE — Device

## (undated) DEVICE — CANNULA VENOUS 2 STAGE 32-40FR

## (undated) DEVICE — ADPT PERFUSION MULTIPLE

## (undated) DEVICE — SOMASENSOR CEREBRAL OXIMETER ADULT SAFB-SM

## (undated) DEVICE — BLOWER/MISTER CLEARVIEW 22150

## (undated) DEVICE — INTRO GLIDESHEATH SLENDER 6FR 10X45CM 60-1060

## (undated) DEVICE — MANIFOLD KIT ANGIO AUTOMATED 014613

## (undated) DEVICE — SYR 30ML LL W/O NDL 302832

## (undated) DEVICE — SYR EAR BULB 3OZ 0035830

## (undated) DEVICE — CATH ANGIO INFINITI JR4 4FRX100CM 538421

## (undated) DEVICE — TUBING SUCTION MEDI-VAC 1/4"X20' N620A

## (undated) DEVICE — DRAPE C-ARM W/STRAPS 42X72" 07-CA104

## (undated) DEVICE — SOLUTION WATER 1000ML R5000-01

## (undated) DEVICE — GLOVE GAMMEX NEOPRENE ULTRA SZ 7.5 LF 8515

## (undated) DEVICE — LINE MONITOR NASAL SMART CAPNOLINE ADULT LONG 12463

## (undated) DEVICE — COVER PROBE ULTRASOUND 3D W/GEL 5X96" LF 20-P3D596

## (undated) DEVICE — SU ETHILON 3-0 PS-1 18" 1663H

## (undated) DEVICE — SU PROLENE 6-0 C-1DA 30" M8706

## (undated) DEVICE — DRAIN CHEST TUBE 32FR STR 8032

## (undated) DEVICE — CLIP HORIZON MULTI MED BLUE 002204

## (undated) DEVICE — SOLUTION IRRIGATION 0.9% NACL 1000ML R5200-01

## (undated) DEVICE — TUBING IRR LG BORE TUBE DRIP CHMBR 2 BG 94IN 313003

## (undated) DEVICE — SU CHROMIC 3-0 SH 27" G122H

## (undated) DEVICE — DRAPE IOBAN INCISE 36X23" 6651EZ

## (undated) DEVICE — LINEN ORTHO PACK 5446

## (undated) DEVICE — CATH TRAY FOLEY SURESTEP 16FR WDRAIN BAG STLK LATEX A300316A

## (undated) DEVICE — RESERVOIR CELL SAVING BLOOD COLLECTION EL2120

## (undated) DEVICE — WIRE GUIDE 0.035"X260CM SAFE-T-J EXCHANGE G00517

## (undated) DEVICE — SU PROLENE 8-0 BV130-5 24" M8732

## (undated) DEVICE — SOL WATER IRRIG 1000ML BOTTLE 2F7114

## (undated) DEVICE — DRAPE PCNL 41-0021

## (undated) DEVICE — ESU ELEC BLADE 2.75" COATED/INSULATED E1455

## (undated) DEVICE — PREP CHLORAPREP 26ML TINTED HI-LITE ORANGE 930815

## (undated) DEVICE — KIT ENDO FIRST STEP DISINFECTANT 200ML W/POUCH EP-4

## (undated) DEVICE — KIT CATH BALLOON NEPHROMAX 24FRX12CM M0062101600

## (undated) DEVICE — SPONGE LAP 18X18" X8435

## (undated) DEVICE — POSITIONER ASSIST ESSTECH 3S T401210S

## (undated) DEVICE — CATH ANGIO JUDKINS R4 6FRX100CM INFINITI 534621T

## (undated) DEVICE — NDL COUNTER 40CT  31142311

## (undated) DEVICE — CABLE MYO/LEAD PACING WHITE DISP 019-530

## (undated) DEVICE — PREP CHLORAPREP W/ORANGE TINT 10.5ML 930715

## (undated) DEVICE — CATH ANGIO JUDKINS JL4 6FRX100CM INFINITI 534620T

## (undated) DEVICE — DEVICE ASSEMBLY SUCTION/ANTI COAG BTC93

## (undated) DEVICE — CATH URETERAL OPEN END 5FRX70CM M0064002010

## (undated) DEVICE — DECANTER BAG 2002S

## (undated) DEVICE — SOL NACL 0.9% IRRIG 1000ML BOTTLE 2F7124

## (undated) DEVICE — DRAPE MAYO STAND 23X54 8337

## (undated) DEVICE — KIT WASH CELL SAVING ATL2001

## (undated) DEVICE — DEFIB PRO-PADZ LVP LQD GEL ADULT 8900-2105-01

## (undated) DEVICE — SU PROLENE 4-0 RB-1DA 36" 8557H

## (undated) DEVICE — SU MONOCRYL 4-0 PS-2 18" UND Y496G

## (undated) DEVICE — GUIDE NEEDLE BK ULTRASOUND 40 X 31 X 39MM UA0013

## (undated) RX ORDER — ONDANSETRON 2 MG/ML
INJECTION INTRAMUSCULAR; INTRAVENOUS
Status: DISPENSED
Start: 2025-03-19

## (undated) RX ORDER — EPHEDRINE SULFATE 50 MG/ML
INJECTION, SOLUTION INTRAMUSCULAR; INTRAVENOUS; SUBCUTANEOUS
Status: DISPENSED
Start: 2025-03-19

## (undated) RX ORDER — FENTANYL CITRATE 50 UG/ML
INJECTION, SOLUTION INTRAMUSCULAR; INTRAVENOUS
Status: DISPENSED
Start: 2022-11-16

## (undated) RX ORDER — VECURONIUM BROMIDE 1 MG/ML
INJECTION, POWDER, LYOPHILIZED, FOR SOLUTION INTRAVENOUS
Status: DISPENSED
Start: 2022-11-16

## (undated) RX ORDER — HEPARIN SODIUM 1000 [USP'U]/ML
INJECTION, SOLUTION INTRAVENOUS; SUBCUTANEOUS
Status: DISPENSED
Start: 2022-11-16

## (undated) RX ORDER — VASOPRESSIN IN 0.9 % NACL 2 UNIT/2ML
SYRINGE (ML) INTRAVENOUS
Status: DISPENSED
Start: 2022-11-16

## (undated) RX ORDER — SODIUM CHLORIDE, SODIUM GLUCONATE, SODIUM ACETATE, POTASSIUM CHLORIDE AND MAGNESIUM CHLORIDE 526; 502; 368; 37; 30 MG/100ML; MG/100ML; MG/100ML; MG/100ML; MG/100ML
INJECTION, SOLUTION INTRAVENOUS
Status: DISPENSED
Start: 2022-11-16

## (undated) RX ORDER — DEXAMETHASONE SODIUM PHOSPHATE 4 MG/ML
INJECTION, SOLUTION INTRA-ARTICULAR; INTRALESIONAL; INTRAMUSCULAR; INTRAVENOUS; SOFT TISSUE
Status: DISPENSED
Start: 2025-03-19

## (undated) RX ORDER — ACETAMINOPHEN 325 MG/1
TABLET ORAL
Status: DISPENSED
Start: 2025-03-19

## (undated) RX ORDER — PROPOFOL 10 MG/ML
INJECTION, EMULSION INTRAVENOUS
Status: DISPENSED
Start: 2022-11-16

## (undated) RX ORDER — HEPARIN SODIUM 200 [USP'U]/100ML
INJECTION, SOLUTION INTRAVENOUS
Status: DISPENSED
Start: 2022-11-16

## (undated) RX ORDER — FENTANYL CITRATE 50 UG/ML
INJECTION, SOLUTION INTRAMUSCULAR; INTRAVENOUS
Status: DISPENSED
Start: 2025-03-19

## (undated) RX ORDER — LIDOCAINE HYDROCHLORIDE 20 MG/ML
INJECTION, SOLUTION EPIDURAL; INFILTRATION; INTRACAUDAL; PERINEURAL
Status: DISPENSED
Start: 2022-11-16

## (undated) RX ORDER — VERAPAMIL HYDROCHLORIDE 2.5 MG/ML
INJECTION, SOLUTION INTRAVENOUS
Status: DISPENSED
Start: 2022-11-16

## (undated) RX ORDER — BUPIVACAINE HYDROCHLORIDE 2.5 MG/ML
INJECTION, SOLUTION EPIDURAL; INFILTRATION; INTRACAUDAL; PERINEURAL
Status: DISPENSED
Start: 2025-03-19

## (undated) RX ORDER — NITROGLYCERIN 5 MG/ML
VIAL (ML) INTRAVENOUS
Status: DISPENSED
Start: 2022-11-16

## (undated) RX ORDER — PAPAVERINE HYDROCHLORIDE 30 MG/ML
INJECTION INTRAMUSCULAR; INTRAVENOUS
Status: DISPENSED
Start: 2022-11-16

## (undated) RX ORDER — FENTANYL CITRATE-0.9 % NACL/PF 10 MCG/ML
PLASTIC BAG, INJECTION (ML) INTRAVENOUS
Status: DISPENSED
Start: 2025-03-19

## (undated) RX ORDER — KETOROLAC TROMETHAMINE 30 MG/ML
INJECTION, SOLUTION INTRAMUSCULAR; INTRAVENOUS
Status: DISPENSED
Start: 2025-03-19

## (undated) RX ORDER — PROPOFOL 10 MG/ML
INJECTION, EMULSION INTRAVENOUS
Status: DISPENSED
Start: 2025-03-19

## (undated) RX ORDER — GLYCOPYRROLATE 0.2 MG/ML
INJECTION, SOLUTION INTRAMUSCULAR; INTRAVENOUS
Status: DISPENSED
Start: 2022-11-16

## (undated) RX ORDER — CEFAZOLIN SODIUM 1 G/3ML
INJECTION, POWDER, FOR SOLUTION INTRAMUSCULAR; INTRAVENOUS
Status: DISPENSED
Start: 2022-11-16

## (undated) RX ORDER — LIDOCAINE HYDROCHLORIDE 10 MG/ML
INJECTION, SOLUTION EPIDURAL; INFILTRATION; INTRACAUDAL; PERINEURAL
Status: DISPENSED
Start: 2022-11-16

## (undated) RX ORDER — NEOSTIGMINE METHYLSULFATE 1 MG/ML
VIAL (ML) INJECTION
Status: DISPENSED
Start: 2022-11-16

## (undated) RX ORDER — ONDANSETRON 2 MG/ML
INJECTION INTRAMUSCULAR; INTRAVENOUS
Status: DISPENSED
Start: 2022-11-16

## (undated) RX ORDER — AMPICILLIN 2 G/1
INJECTION, POWDER, FOR SOLUTION INTRAVENOUS
Status: DISPENSED
Start: 2025-03-19

## (undated) RX ORDER — HEPARIN SODIUM 10000 [USP'U]/100ML
INJECTION, SOLUTION INTRAVENOUS
Status: DISPENSED
Start: 2022-11-16

## (undated) RX ORDER — PROTAMINE SULFATE 10 MG/ML
INJECTION, SOLUTION INTRAVENOUS
Status: DISPENSED
Start: 2022-11-16

## (undated) RX ORDER — NITROGLYCERIN 20 MG/100ML
INJECTION INTRAVENOUS
Status: DISPENSED
Start: 2022-11-16